# Patient Record
Sex: MALE | Race: WHITE | NOT HISPANIC OR LATINO | Employment: OTHER | URBAN - NONMETROPOLITAN AREA
[De-identification: names, ages, dates, MRNs, and addresses within clinical notes are randomized per-mention and may not be internally consistent; named-entity substitution may affect disease eponyms.]

---

## 2021-02-19 NOTE — PROGRESS NOTES
"Subjective     Christopher Valentine is a 70 year old male who presents to clinic today for the following health issues:    HPI         New Patient/Transfer of Care  Mole      Duration: long time    Description (location/character/radiation): left nipple    Intensity:  0/10    Accompanying signs and symptoms: just saw it the other day and would like it to be checked out    History (similar episodes/previous evaluation): None    Precipitating or alleviating factors: None    Therapies tried and outcome: None       There is no problem list on file for this patient.    Past Surgical History:   Procedure Laterality Date     ANKLE SURGERY  2000    LT, hardware removal and ankle fusion; traumatic arthritis     COLONOSCOPY  2011    with polypectomy       Social History     Tobacco Use     Smoking status: Light Tobacco Smoker     Types: Cigarettes     Smokeless tobacco: Never Used   Substance Use Topics     Alcohol use: Yes     Frequency: 4 or more times a week     Drinks per session: 1 or 2     Binge frequency: Never     Comment: beer daily      Family History   Problem Relation Age of Onset     Cancer - colorectal Father         (cause of death)          No current outpatient medications on file.     No Known Allergies  No lab results found.   BP Readings from Last 3 Encounters:   03/05/21 (!) 140/70    Wt Readings from Last 3 Encounters:   03/05/21 86.3 kg (190 lb 3.2 oz)                    Reviewed and updated as needed this visit by Provider                 Review of Systems   Constitutional, HEENT, cardiovascular, pulmonary, gi and gu systems are negative, except as otherwise noted.      Objective    BP (!) 140/70 (BP Location: Left arm, Patient Position: Sitting, Cuff Size: Adult Regular)   Pulse 85   Temp 98.5  F (36.9  C) (Tympanic)   Ht 1.732 m (5' 8.2\")   Wt 86.3 kg (190 lb 3.2 oz)   SpO2 97%   BMI 28.75 kg/m    Body mass index is 28.75 kg/m .  Physical Exam   GENERAL: healthy, alert and no distress  EYES: Eyes " grossly normal to inspection, PERRL and conjunctivae and sclerae normal  HENT: ear canals and TM's normal, nose and mouth without ulcers or lesions  NECK: no adenopathy, no asymmetry, masses, or scars and thyroid normal to palpation  RESP: lungs clear to auscultation - no rales, rhonchi or wheezes  CV: regular rate and rhythm, normal S1 S2, no S3 or S4, no murmur, click or rub, no peripheral edema and peripheral pulses strong  ABDOMEN: soft, nontender, no hepatosplenomegaly, no masses and bowel sounds normal  MS: no gross musculoskeletal defects noted, no edema  SKIN: no suspicious lesions or rashes  NEURO: Normal strength and tone, mentation intact and speech normal  BACK: no CVA tenderness, no paralumbar tenderness  LYMPH: no cervical, supraclavicular, axillary, or inguinal adenopathy    Diagnostic Test Results:  Labs reviewed in Epic  No results found for this or any previous visit (from the past 24 hour(s)).      Results for orders placed or performed in visit on 03/05/21   Comprehensive metabolic panel (BMP + Alb, Alk Phos, ALT, AST, Total. Bili, TP)     Status: Abnormal   Result Value Ref Range    Sodium 135 133 - 144 mmol/L    Potassium 3.8 3.4 - 5.3 mmol/L    Chloride 104 94 - 109 mmol/L    Carbon Dioxide 25 20 - 32 mmol/L    Anion Gap 6 3 - 14 mmol/L    Glucose 101 (H) 70 - 99 mg/dL    Urea Nitrogen 8 7 - 30 mg/dL    Creatinine 0.72 0.66 - 1.25 mg/dL    GFR Estimate >90 >60 mL/min/[1.73_m2]    GFR Estimate If Black >90 >60 mL/min/[1.73_m2]    Calcium 8.7 8.5 - 10.1 mg/dL    Bilirubin Total 1.6 (H) 0.2 - 1.3 mg/dL    Albumin 3.7 3.4 - 5.0 g/dL    Protein Total 7.5 6.8 - 8.8 g/dL    Alkaline Phosphatase 91 40 - 150 U/L    ALT 33 0 - 70 U/L    AST 42 0 - 45 U/L   CBC with platelets differential     Status: None   Result Value Ref Range    WBC 6.6 4.0 - 11.0 10e9/L    RBC Count 4.97 4.4 - 5.9 10e12/L    Hemoglobin 16.2 13.3 - 17.7 g/dL    Hematocrit 45.3 40.0 - 53.0 %    MCV 91 78 - 100 fl    MCH 32.6 26.5 -  33.0 pg    MCHC 35.8 31.5 - 36.5 g/dL    RDW 12.0 10.0 - 15.0 %    Platelet Count 244 150 - 450 10e9/L    Diff Method Automated Method     % Neutrophils 48.5 %    % Lymphocytes 39.4 %    % Monocytes 9.2 %    % Eosinophils 1.8 %    % Basophils 0.8 %    % Immature Granulocytes 0.3 %    Nucleated RBCs 0 0 /100    Absolute Neutrophil 3.2 1.6 - 8.3 10e9/L    Absolute Lymphocytes 2.6 0.8 - 5.3 10e9/L    Absolute Monocytes 0.6 0.0 - 1.3 10e9/L    Absolute Eosinophils 0.1 0.0 - 0.7 10e9/L    Absolute Basophils 0.1 0.0 - 0.2 10e9/L    Abs Immature Granulocytes 0.0 0 - 0.4 10e9/L    Absolute Nucleated RBC 0.0    TSH with free T4 reflex     Status: None   Result Value Ref Range    TSH 1.31 0.40 - 4.00 mU/L   PSA, screen     Status: None   Result Value Ref Range    PSA 2.33 0 - 4 ug/L   Lipid Profile (Chol, Trig, HDL, LDL calc)     Status: Abnormal   Result Value Ref Range    Cholesterol 189 <200 mg/dL    Triglycerides 258 (H) <150 mg/dL    HDL Cholesterol 52 >39 mg/dL    LDL Cholesterol Calculated 85 <100 mg/dL    Non HDL Cholesterol 137 (H) <130 mg/dL         Assessment & Plan     Special screening for malignant neoplasms, colon  He is due for this.  He is referred to see general surgery.   - GENERAL SURG ADULT REFERRAL    High cholesterol  Known ot have high tgl. Does partake in etoh at night. Lives alone and cooks well he feels.   - Lipid Profile (Chol, Trig, HDL, LDL calc); Future  - TSH with free T4 reflex    Tobacco use disorder  Will be given a screening for AAA.  He is has never had one. States he doesn't even bring a pack with him in the car any more. Has cut way back.   - [place] Aorta Medicare AAA Screening; Future  - General PFT Lab (Please always keep checked); Future  - Pulmonary Function Test; Future  - General PFT Lab (Please always keep checked)    Personal history of tobacco use  He needs a chest screening. Was a 40 efe pack smoker. Does have smokers coughing and will also get a PFT.  See how his lung status is.  "  - Prof fee: Shared Decisionmaking for Lung Cancer Screening  - CT Chest Lung Cancer Scrn Low Dose wo; Future    Encounter for hepatitis C screening test for low risk patient  Due for this.   - Hepatitis C Screen Reflex to HCV RNA Quant and Genotype    Uncomplicated alcohol dependence (H)  Does partake in etoh every night at least one can be up to 6 to 12. In a single night.   - GGT  - Comprehensive metabolic panel (BMP + Alb, Alk Phos, ALT, AST, Total. Bili, TP)  - CBC with platelets differential    Screening PSA (prostate specific antigen)  Due for screening. Denies LUTS.  He does get up at night but only once time.   - PSA, screen     Tobacco Cessation:   reports that he has been smoking cigarettes. He has never used smokeless tobacco.  Tobacco Cessation Action Plan: Information offered: Patient not interested at this time      BMI:   Estimated body mass index is 28.75 kg/m  as calculated from the following:    Height as of this encounter: 1.732 m (5' 8.2\").    Weight as of this encounter: 86.3 kg (190 lb 3.2 oz).            See Patient Instructions    No follow-ups on file.    JOSELO Suarez  [facility] - [place]  "

## 2021-03-05 ENCOUNTER — OFFICE VISIT (OUTPATIENT)
Dept: FAMILY MEDICINE | Facility: OTHER | Age: 71
End: 2021-03-05
Attending: PHYSICIAN ASSISTANT
Payer: COMMERCIAL

## 2021-03-05 VITALS
TEMPERATURE: 98.5 F | WEIGHT: 190.2 LBS | HEART RATE: 85 BPM | OXYGEN SATURATION: 97 % | DIASTOLIC BLOOD PRESSURE: 70 MMHG | BODY MASS INDEX: 28.82 KG/M2 | HEIGHT: 68 IN | SYSTOLIC BLOOD PRESSURE: 140 MMHG

## 2021-03-05 DIAGNOSIS — Z87.891 PERSONAL HISTORY OF TOBACCO USE: ICD-10-CM

## 2021-03-05 DIAGNOSIS — Z12.11 SPECIAL SCREENING FOR MALIGNANT NEOPLASMS, COLON: Primary | ICD-10-CM

## 2021-03-05 DIAGNOSIS — E78.00 HIGH CHOLESTEROL: ICD-10-CM

## 2021-03-05 DIAGNOSIS — Z11.59 ENCOUNTER FOR HEPATITIS C SCREENING TEST FOR LOW RISK PATIENT: ICD-10-CM

## 2021-03-05 DIAGNOSIS — F17.200 TOBACCO USE DISORDER: ICD-10-CM

## 2021-03-05 DIAGNOSIS — Z12.5 SCREENING PSA (PROSTATE SPECIFIC ANTIGEN): ICD-10-CM

## 2021-03-05 DIAGNOSIS — F10.20 UNCOMPLICATED ALCOHOL DEPENDENCE (H): ICD-10-CM

## 2021-03-05 LAB
ALBUMIN SERPL-MCNC: 3.7 G/DL (ref 3.4–5)
ALP SERPL-CCNC: 91 U/L (ref 40–150)
ALT SERPL W P-5'-P-CCNC: 33 U/L (ref 0–70)
ANION GAP SERPL CALCULATED.3IONS-SCNC: 6 MMOL/L (ref 3–14)
AST SERPL W P-5'-P-CCNC: 42 U/L (ref 0–45)
BASOPHILS # BLD AUTO: 0.1 10E9/L (ref 0–0.2)
BASOPHILS NFR BLD AUTO: 0.8 %
BILIRUB SERPL-MCNC: 1.6 MG/DL (ref 0.2–1.3)
BUN SERPL-MCNC: 8 MG/DL (ref 7–30)
CALCIUM SERPL-MCNC: 8.7 MG/DL (ref 8.5–10.1)
CHLORIDE SERPL-SCNC: 104 MMOL/L (ref 94–109)
CHOLEST SERPL-MCNC: 189 MG/DL
CO2 SERPL-SCNC: 25 MMOL/L (ref 20–32)
CREAT SERPL-MCNC: 0.72 MG/DL (ref 0.66–1.25)
DIFFERENTIAL METHOD BLD: NORMAL
EOSINOPHIL # BLD AUTO: 0.1 10E9/L (ref 0–0.7)
EOSINOPHIL NFR BLD AUTO: 1.8 %
ERYTHROCYTE [DISTWIDTH] IN BLOOD BY AUTOMATED COUNT: 12 % (ref 10–15)
GFR SERPL CREATININE-BSD FRML MDRD: >90 ML/MIN/{1.73_M2}
GLUCOSE SERPL-MCNC: 101 MG/DL (ref 70–99)
HCT VFR BLD AUTO: 45.3 % (ref 40–53)
HDLC SERPL-MCNC: 52 MG/DL
HGB BLD-MCNC: 16.2 G/DL (ref 13.3–17.7)
IMM GRANULOCYTES # BLD: 0 10E9/L (ref 0–0.4)
IMM GRANULOCYTES NFR BLD: 0.3 %
LDLC SERPL CALC-MCNC: 85 MG/DL
LYMPHOCYTES # BLD AUTO: 2.6 10E9/L (ref 0.8–5.3)
LYMPHOCYTES NFR BLD AUTO: 39.4 %
MCH RBC QN AUTO: 32.6 PG (ref 26.5–33)
MCHC RBC AUTO-ENTMCNC: 35.8 G/DL (ref 31.5–36.5)
MCV RBC AUTO: 91 FL (ref 78–100)
MONOCYTES # BLD AUTO: 0.6 10E9/L (ref 0–1.3)
MONOCYTES NFR BLD AUTO: 9.2 %
NEUTROPHILS # BLD AUTO: 3.2 10E9/L (ref 1.6–8.3)
NEUTROPHILS NFR BLD AUTO: 48.5 %
NONHDLC SERPL-MCNC: 137 MG/DL
NRBC # BLD AUTO: 0 10*3/UL
NRBC BLD AUTO-RTO: 0 /100
PLATELET # BLD AUTO: 244 10E9/L (ref 150–450)
POTASSIUM SERPL-SCNC: 3.8 MMOL/L (ref 3.4–5.3)
PROT SERPL-MCNC: 7.5 G/DL (ref 6.8–8.8)
PSA SERPL-ACNC: 2.33 UG/L (ref 0–4)
RBC # BLD AUTO: 4.97 10E12/L (ref 4.4–5.9)
SODIUM SERPL-SCNC: 135 MMOL/L (ref 133–144)
TRIGL SERPL-MCNC: 258 MG/DL
TSH SERPL DL<=0.005 MIU/L-ACNC: 1.31 MU/L (ref 0.4–4)
WBC # BLD AUTO: 6.6 10E9/L (ref 4–11)

## 2021-03-05 PROCEDURE — G0103 PSA SCREENING: HCPCS | Mod: ZL | Performed by: PHYSICIAN ASSISTANT

## 2021-03-05 PROCEDURE — 80061 LIPID PANEL: CPT | Mod: ZL | Performed by: PHYSICIAN ASSISTANT

## 2021-03-05 PROCEDURE — G0463 HOSPITAL OUTPT CLINIC VISIT: HCPCS

## 2021-03-05 PROCEDURE — 80053 COMPREHEN METABOLIC PANEL: CPT | Mod: ZL | Performed by: PHYSICIAN ASSISTANT

## 2021-03-05 PROCEDURE — 36415 COLL VENOUS BLD VENIPUNCTURE: CPT | Mod: ZL | Performed by: PHYSICIAN ASSISTANT

## 2021-03-05 PROCEDURE — 86803 HEPATITIS C AB TEST: CPT | Mod: ZL | Performed by: PHYSICIAN ASSISTANT

## 2021-03-05 PROCEDURE — G0463 HOSPITAL OUTPT CLINIC VISIT: HCPCS | Mod: 25

## 2021-03-05 PROCEDURE — G0296 VISIT TO DETERM LDCT ELIG: HCPCS | Performed by: PHYSICIAN ASSISTANT

## 2021-03-05 PROCEDURE — 82977 ASSAY OF GGT: CPT | Mod: ZL | Performed by: PHYSICIAN ASSISTANT

## 2021-03-05 PROCEDURE — 99204 OFFICE O/P NEW MOD 45 MIN: CPT | Mod: 25 | Performed by: PHYSICIAN ASSISTANT

## 2021-03-05 PROCEDURE — 84443 ASSAY THYROID STIM HORMONE: CPT | Mod: ZL | Performed by: PHYSICIAN ASSISTANT

## 2021-03-05 PROCEDURE — 85025 COMPLETE CBC W/AUTO DIFF WBC: CPT | Mod: ZL | Performed by: PHYSICIAN ASSISTANT

## 2021-03-05 SDOH — HEALTH STABILITY: MENTAL HEALTH: HOW OFTEN DO YOU HAVE 6 OR MORE DRINKS ON ONE OCCASION?: NEVER

## 2021-03-05 SDOH — HEALTH STABILITY: MENTAL HEALTH: HOW OFTEN DO YOU HAVE A DRINK CONTAINING ALCOHOL?: 4 OR MORE TIMES A WEEK

## 2021-03-05 SDOH — HEALTH STABILITY: MENTAL HEALTH: HOW MANY STANDARD DRINKS CONTAINING ALCOHOL DO YOU HAVE ON A TYPICAL DAY?: 1 OR 2

## 2021-03-05 ASSESSMENT — ANXIETY QUESTIONNAIRES
2. NOT BEING ABLE TO STOP OR CONTROL WORRYING: NOT AT ALL
GAD7 TOTAL SCORE: 0
6. BECOMING EASILY ANNOYED OR IRRITABLE: NOT AT ALL
7. FEELING AFRAID AS IF SOMETHING AWFUL MIGHT HAPPEN: NOT AT ALL
5. BEING SO RESTLESS THAT IT IS HARD TO SIT STILL: NOT AT ALL
1. FEELING NERVOUS, ANXIOUS, OR ON EDGE: NOT AT ALL
4. TROUBLE RELAXING: NOT AT ALL
3. WORRYING TOO MUCH ABOUT DIFFERENT THINGS: NOT AT ALL

## 2021-03-05 ASSESSMENT — PAIN SCALES - GENERAL: PAINLEVEL: MILD PAIN (3)

## 2021-03-05 ASSESSMENT — PATIENT HEALTH QUESTIONNAIRE - PHQ9: SUM OF ALL RESPONSES TO PHQ QUESTIONS 1-9: 0

## 2021-03-05 ASSESSMENT — MIFFLIN-ST. JEOR: SCORE: 1605.42

## 2021-03-05 NOTE — PATIENT INSTRUCTIONS

## 2021-03-05 NOTE — PROGRESS NOTES
Lung Cancer Screening Shared Decision Making Visit     Christopher Valentine is eligible for lung cancer screening on the basis of the information provided in my signed lung cancer screening order.     I have discussed with patient the risks and benefits of screening for lung cancer with low-dose CT.     The risks include:  radiation exposure: one low dose chest CT has as much ionizing radiation as about 15 chest x-rays or 6 months of background radiation living in [place]    false positives: 96% of positive findings/nodules are NOT cancer, but some might still require additional diagnostic evaluation, including biopsy  over-diagnosis: some slow growing cancers that might never have been clinically significant will be detected and treated unnecessarily     The benefit of early detection of lung cancer is contingent upon adherence to annual screening or more frequent follow up if indicated.     Furthermore, reaping the benefits of screening requires Christopher Valentine to be willing and physically able to undergo diagnostic procedures, if indicated. Although no specific guide is available for determining severity of comorbidities, it is reasonable to withhold screening in patients who have greater mortality risk from other diseases.     We did discuss that the only way to prevent lung cancer is to not smoke. Smoking cessation counseling was given, duration 3-10 minutes.      I did offer risk estimation using a calculator such as this one:    ShouldIScreen

## 2021-03-05 NOTE — NURSING NOTE
"Chief Complaint   Patient presents with     Establish Care       Initial BP (!) 140/70 (BP Location: Left arm, Patient Position: Sitting, Cuff Size: Adult Regular)   Pulse 85   Temp 98.5  F (36.9  C) (Tympanic)   Ht 1.732 m (5' 8.2\")   Wt 86.3 kg (190 lb 3.2 oz)   SpO2 97%   BMI 28.75 kg/m   Estimated body mass index is 28.75 kg/m  as calculated from the following:    Height as of this encounter: 1.732 m (5' 8.2\").    Weight as of this encounter: 86.3 kg (190 lb 3.2 oz).  Medication Reconciliation: complete  Kay Angelo LPN  "

## 2021-03-06 LAB — GGT SERPL-CCNC: 292 U/L (ref 0–75)

## 2021-03-06 ASSESSMENT — ANXIETY QUESTIONNAIRES: GAD7 TOTAL SCORE: 0

## 2021-03-08 LAB — HCV AB SERPL QL IA: NONREACTIVE

## 2021-03-16 ENCOUNTER — HOSPITAL ENCOUNTER (OUTPATIENT)
Dept: RESPIRATORY THERAPY | Facility: HOSPITAL | Age: 71
Discharge: HOME | End: 2021-03-16
Attending: PHYSICIAN ASSISTANT | Admitting: INTERNAL MEDICINE
Payer: COMMERCIAL

## 2021-03-16 DIAGNOSIS — F17.200 TOBACCO USE DISORDER: ICD-10-CM

## 2021-03-16 LAB
COHGB MFR BLD: 1.1 % (ref 0–2)
HGB BLD-MCNC: 16.3 G/DL (ref 13.3–17.7)

## 2021-03-16 PROCEDURE — 82375 ASSAY CARBOXYHB QUANT: CPT | Performed by: PHYSICIAN ASSISTANT

## 2021-03-16 PROCEDURE — 85018 HEMOGLOBIN: CPT | Performed by: PHYSICIAN ASSISTANT

## 2021-03-16 PROCEDURE — 94729 DIFFUSING CAPACITY: CPT | Mod: 26 | Performed by: INTERNAL MEDICINE

## 2021-03-16 PROCEDURE — 94729 DIFFUSING CAPACITY: CPT

## 2021-03-16 PROCEDURE — 94010 BREATHING CAPACITY TEST: CPT | Mod: 26 | Performed by: INTERNAL MEDICINE

## 2021-03-16 PROCEDURE — 94726 PLETHYSMOGRAPHY LUNG VOLUMES: CPT | Mod: 26 | Performed by: INTERNAL MEDICINE

## 2021-03-16 PROCEDURE — 94726 PLETHYSMOGRAPHY LUNG VOLUMES: CPT

## 2021-03-16 PROCEDURE — 94010 BREATHING CAPACITY TEST: CPT

## 2021-03-18 ENCOUNTER — PREP FOR PROCEDURE (OUTPATIENT)
Dept: SURGERY | Facility: OTHER | Age: 71
End: 2021-03-18

## 2021-03-18 ENCOUNTER — TELEPHONE (OUTPATIENT)
Dept: SURGERY | Facility: OTHER | Age: 71
End: 2021-03-18

## 2021-03-18 DIAGNOSIS — Z01.818 PREOP TESTING: Primary | ICD-10-CM

## 2021-03-18 DIAGNOSIS — Z12.11 SPECIAL SCREENING FOR MALIGNANT NEOPLASMS, COLON: Primary | ICD-10-CM

## 2021-03-18 NOTE — TELEPHONE ENCOUNTER
Referral received for colonoscopy.   This patient was approved by surgery education nurses for meet and greet colonoscopy and will need a preop appointment.   Patient scheduled for colonoscopy on 4/15/21 with Dr. Smith at [facility] with gatorade bowel prep.   Instructions given via phone and instructions mailed to patient with surgery handbook. Larisa Gomez LPN

## 2021-03-26 ENCOUNTER — HOSPITAL ENCOUNTER (OUTPATIENT)
Dept: ULTRASOUND IMAGING | Facility: HOSPITAL | Age: 71
End: 2021-03-26
Attending: PHYSICIAN ASSISTANT
Payer: COMMERCIAL

## 2021-03-26 ENCOUNTER — HOSPITAL ENCOUNTER (OUTPATIENT)
Dept: CT IMAGING | Facility: HOSPITAL | Age: 71
End: 2021-03-26
Attending: PHYSICIAN ASSISTANT
Payer: COMMERCIAL

## 2021-03-26 DIAGNOSIS — F17.200 TOBACCO USE DISORDER: ICD-10-CM

## 2021-03-26 DIAGNOSIS — Z87.891 PERSONAL HISTORY OF TOBACCO USE: ICD-10-CM

## 2021-03-26 PROCEDURE — 71271 CT THORAX LUNG CANCER SCR C-: CPT

## 2021-03-26 PROCEDURE — 76706 US ABDL AORTA SCREEN AAA: CPT

## 2021-03-26 NOTE — H&P (VIEW-ONLY)
Subjective     Christopher Valentine is a 71 year old male who presents to clinic today for the following health issues:    HPI         Concern - Mole follow up  Onset: has been there for a long time  Description: left nipple  Intensity: moderate  Progression of Symptoms:  same and constant  Accompanying Signs & Symptoms: NA  Previous history of similar problem: NA  Precipitating factors:        Worsened by: NA  Alleviating factors:        Improved by: NA  Therapies tried and outcome: monitor    Follow up on review of testing.     Taking Medication as prescribed: yes    Side Effects:  None    Medication Helping Symptoms:  yes       Patient Active Problem List   Diagnosis     Special screening for malignant neoplasms, colon     Uncomplicated alcohol dependence (H)     Tobacco use disorder     High cholesterol     Past Surgical History:   Procedure Laterality Date     ANKLE SURGERY  2000    LT, hardware removal and ankle fusion; traumatic arthritis     COLONOSCOPY  2011    with polypectomy       Social History     Tobacco Use     Smoking status: Light Tobacco Smoker     Types: Cigarettes     Smokeless tobacco: Never Used   Substance Use Topics     Alcohol use: Yes     Frequency: 4 or more times a week     Drinks per session: 1 or 2     Binge frequency: Never     Comment: beer daily      Family History   Problem Relation Age of Onset     Cancer - colorectal Father         (cause of death)      Cancer Father      Alzheimer Disease Mother          Current Outpatient Medications   Medication Sig Dispense Refill     aspirin (ASA) 325 MG EC tablet Take 325 mg by mouth every 6 hours as needed for moderate pain       No Known Allergies  Recent Labs   Lab Test 03/05/21  1439   LDL 85   HDL 52   TRIG 258*   ALT 33   CR 0.72   GFRESTIMATED >90   GFRESTBLACK >90   POTASSIUM 3.8   TSH 1.31      BP Readings from Last 3 Encounters:   04/02/21 132/76   03/05/21 (!) 140/70    Wt Readings from Last 3 Encounters:   04/02/21 86.6 kg (191  "lb)   03/05/21 86.3 kg (190 lb 3.2 oz)                    Reviewed and updated as needed this visit by Provider                 Review of Systems   Constitutional, HEENT, cardiovascular, pulmonary, gi and gu systems are negative, except as otherwise noted.      Objective    /76 (BP Location: Right arm, Patient Position: Sitting, Cuff Size: Adult Large)   Pulse 77   Temp 97.8  F (36.6  C) (Tympanic)   Ht 1.735 m (5' 8.3\")   Wt 86.6 kg (191 lb)   SpO2 97%   BMI 28.79 kg/m    Body mass index is 28.79 kg/m .  Physical Exam   GENERAL: healthy, alert and no distress  NECK: no adenopathy, no asymmetry, masses, or scars and thyroid normal to palpation  RESP: lungs clear to auscultation - no rales, rhonchi or wheezes  SKIN: has 7 lesions all AK, frozen and thaw x3 per protocol. These we all on his torso and arms.   PSYCHE: reviewed all imaging and explained findings.  Labs and findings and repeat follow up.         Hospital Outpatient Visit on 03/16/2021   Component Date Value Ref Range Status     Carbon Monoxide 03/16/2021 1.1  0 - 2 % Final     Hemoglobin 03/16/2021 16.3  13.3 - 17.7 g/dL Final     Review PFT and his labs.   Lab Results   Component Value Date    WBC 6.6 03/05/2021     Lab Results   Component Value Date    RBC 4.97 03/05/2021     Lab Results   Component Value Date    HGB 16.3 03/16/2021     Lab Results   Component Value Date    HCT 45.3 03/05/2021     No components found for: MCT  Lab Results   Component Value Date    MCV 91 03/05/2021     Lab Results   Component Value Date    MCH 32.6 03/05/2021     Lab Results   Component Value Date    MCHC 35.8 03/05/2021     Lab Results   Component Value Date    RDW 12.0 03/05/2021     Lab Results   Component Value Date     03/05/2021     Last Comprehensive Metabolic Panel:  Sodium   Date Value Ref Range Status   03/05/2021 135 133 - 144 mmol/L Final     Potassium   Date Value Ref Range Status   03/05/2021 3.8 3.4 - 5.3 mmol/L Final     Chloride "   Date Value Ref Range Status   03/05/2021 104 94 - 109 mmol/L Final     Carbon Dioxide   Date Value Ref Range Status   03/05/2021 25 20 - 32 mmol/L Final     Anion Gap   Date Value Ref Range Status   03/05/2021 6 3 - 14 mmol/L Final     Glucose   Date Value Ref Range Status   03/05/2021 101 (H) 70 - 99 mg/dL Final     Urea Nitrogen   Date Value Ref Range Status   03/05/2021 8 7 - 30 mg/dL Final     Creatinine   Date Value Ref Range Status   03/05/2021 0.72 0.66 - 1.25 mg/dL Final     GFR Estimate   Date Value Ref Range Status   03/05/2021 >90 >60 mL/min/[1.73_m2] Final     Comment:     Non  GFR Calc  Starting 12/18/2018, serum creatinine based estimated GFR (eGFR) will be   calculated using the Chronic Kidney Disease Epidemiology Collaboration   (CKD-EPI) equation.       Calcium   Date Value Ref Range Status   03/05/2021 8.7 8.5 - 10.1 mg/dL Final     Bilirubin Total   Date Value Ref Range Status   03/05/2021 1.6 (H) 0.2 - 1.3 mg/dL Final     Alkaline Phosphatase   Date Value Ref Range Status   03/05/2021 91 40 - 150 U/L Final     ALT   Date Value Ref Range Status   03/05/2021 33 0 - 70 U/L Final     AST   Date Value Ref Range Status   03/05/2021 42 0 - 45 U/L Final             PSA   Date Value Ref Range Status   03/05/2021 2.33 0 - 4 ug/L Final     Comment:     Assay Method:  Chemiluminescence using Siemens Vista analyzer     Lab Results   Component Value Date    CHOL 189 03/05/2021     Lab Results   Component Value Date    HDL 52 03/05/2021     Lab Results   Component Value Date    LDL 85 03/05/2021     Lab Results   Component Value Date    TRIG 258 03/05/2021     No results found for: CHOLHDLRATIO          Assessment & Plan     Need for vaccination for pneumococcus  Given. declines the TDAP not covered by Medicare   - PPSV23, IM/SUBQ (2+ YRS) - [id number]    AK (actinic keratosis)  Multiple on thorax frozen and thaw per protocol.     High cholesterol  We reviewed his labs TGL are really the only  issues. Denies gout. Encourage healthy lifestyle. He is reviewed on his Chest CT as well. Mild Coronary Calcium.      Tobacco use disorder  Small nodules are noted. Mild Emphysema on reviewing his PFT with him today. Also noted his CT shows no sign of emphysema on the scan.   Explained he did not meet criteria for bronchodilator but when he is sick he might need it. He still has prolonged expiratory phase.       5. Preoperative clearance  Graham needs clearance for his colon screening , his lungs and heart are evaluated and reviewed. He is optimized.         See Patient Instructions    No follow-ups on file.    JOSELO Suarez  [facility] - [place]

## 2021-03-26 NOTE — PROGRESS NOTES
Subjective     Christopher Valentine is a 71 year old male who presents to clinic today for the following health issues:    HPI         Concern - Mole follow up  Onset: has been there for a long time  Description: left nipple  Intensity: moderate  Progression of Symptoms:  same and constant  Accompanying Signs & Symptoms: NA  Previous history of similar problem: NA  Precipitating factors:        Worsened by: NA  Alleviating factors:        Improved by: NA  Therapies tried and outcome: monitor    Follow up on review of testing.     Taking Medication as prescribed: yes    Side Effects:  None    Medication Helping Symptoms:  yes       Patient Active Problem List   Diagnosis     Special screening for malignant neoplasms, colon     Uncomplicated alcohol dependence (H)     Tobacco use disorder     High cholesterol     Past Surgical History:   Procedure Laterality Date     ANKLE SURGERY  2000    LT, hardware removal and ankle fusion; traumatic arthritis     COLONOSCOPY  2011    with polypectomy       Social History     Tobacco Use     Smoking status: Light Tobacco Smoker     Types: Cigarettes     Smokeless tobacco: Never Used   Substance Use Topics     Alcohol use: Yes     Frequency: 4 or more times a week     Drinks per session: 1 or 2     Binge frequency: Never     Comment: beer daily      Family History   Problem Relation Age of Onset     Cancer - colorectal Father         (cause of death)      Cancer Father      Alzheimer Disease Mother          Current Outpatient Medications   Medication Sig Dispense Refill     aspirin (ASA) 325 MG EC tablet Take 325 mg by mouth every 6 hours as needed for moderate pain       No Known Allergies  Recent Labs   Lab Test 03/05/21  1439   LDL 85   HDL 52   TRIG 258*   ALT 33   CR 0.72   GFRESTIMATED >90   GFRESTBLACK >90   POTASSIUM 3.8   TSH 1.31      BP Readings from Last 3 Encounters:   04/02/21 132/76   03/05/21 (!) 140/70    Wt Readings from Last 3 Encounters:   04/02/21 86.6 kg (191  "lb)   03/05/21 86.3 kg (190 lb 3.2 oz)                    Reviewed and updated as needed this visit by Provider                 Review of Systems   Constitutional, HEENT, cardiovascular, pulmonary, gi and gu systems are negative, except as otherwise noted.      Objective    /76 (BP Location: Right arm, Patient Position: Sitting, Cuff Size: Adult Large)   Pulse 77   Temp 97.8  F (36.6  C) (Tympanic)   Ht 1.735 m (5' 8.3\")   Wt 86.6 kg (191 lb)   SpO2 97%   BMI 28.79 kg/m    Body mass index is 28.79 kg/m .  Physical Exam   GENERAL: healthy, alert and no distress  NECK: no adenopathy, no asymmetry, masses, or scars and thyroid normal to palpation  RESP: lungs clear to auscultation - no rales, rhonchi or wheezes  SKIN: has 7 lesions all AK, frozen and thaw x3 per protocol. These we all on his torso and arms.   PSYCHE: reviewed all imaging and explained findings.  Labs and findings and repeat follow up.         Hospital Outpatient Visit on 03/16/2021   Component Date Value Ref Range Status     Carbon Monoxide 03/16/2021 1.1  0 - 2 % Final     Hemoglobin 03/16/2021 16.3  13.3 - 17.7 g/dL Final     Review PFT and his labs.   Lab Results   Component Value Date    WBC 6.6 03/05/2021     Lab Results   Component Value Date    RBC 4.97 03/05/2021     Lab Results   Component Value Date    HGB 16.3 03/16/2021     Lab Results   Component Value Date    HCT 45.3 03/05/2021     No components found for: MCT  Lab Results   Component Value Date    MCV 91 03/05/2021     Lab Results   Component Value Date    MCH 32.6 03/05/2021     Lab Results   Component Value Date    MCHC 35.8 03/05/2021     Lab Results   Component Value Date    RDW 12.0 03/05/2021     Lab Results   Component Value Date     03/05/2021     Last Comprehensive Metabolic Panel:  Sodium   Date Value Ref Range Status   03/05/2021 135 133 - 144 mmol/L Final     Potassium   Date Value Ref Range Status   03/05/2021 3.8 3.4 - 5.3 mmol/L Final     Chloride "   Date Value Ref Range Status   03/05/2021 104 94 - 109 mmol/L Final     Carbon Dioxide   Date Value Ref Range Status   03/05/2021 25 20 - 32 mmol/L Final     Anion Gap   Date Value Ref Range Status   03/05/2021 6 3 - 14 mmol/L Final     Glucose   Date Value Ref Range Status   03/05/2021 101 (H) 70 - 99 mg/dL Final     Urea Nitrogen   Date Value Ref Range Status   03/05/2021 8 7 - 30 mg/dL Final     Creatinine   Date Value Ref Range Status   03/05/2021 0.72 0.66 - 1.25 mg/dL Final     GFR Estimate   Date Value Ref Range Status   03/05/2021 >90 >60 mL/min/[1.73_m2] Final     Comment:     Non  GFR Calc  Starting 12/18/2018, serum creatinine based estimated GFR (eGFR) will be   calculated using the Chronic Kidney Disease Epidemiology Collaboration   (CKD-EPI) equation.       Calcium   Date Value Ref Range Status   03/05/2021 8.7 8.5 - 10.1 mg/dL Final     Bilirubin Total   Date Value Ref Range Status   03/05/2021 1.6 (H) 0.2 - 1.3 mg/dL Final     Alkaline Phosphatase   Date Value Ref Range Status   03/05/2021 91 40 - 150 U/L Final     ALT   Date Value Ref Range Status   03/05/2021 33 0 - 70 U/L Final     AST   Date Value Ref Range Status   03/05/2021 42 0 - 45 U/L Final             PSA   Date Value Ref Range Status   03/05/2021 2.33 0 - 4 ug/L Final     Comment:     Assay Method:  Chemiluminescence using Siemens Vista analyzer     Lab Results   Component Value Date    CHOL 189 03/05/2021     Lab Results   Component Value Date    HDL 52 03/05/2021     Lab Results   Component Value Date    LDL 85 03/05/2021     Lab Results   Component Value Date    TRIG 258 03/05/2021     No results found for: CHOLHDLRATIO          Assessment & Plan     Need for vaccination for pneumococcus  Given. declines the TDAP not covered by Medicare   - PPSV23, IM/SUBQ (2+ YRS) - [id number]    AK (actinic keratosis)  Multiple on thorax frozen and thaw per protocol.     High cholesterol  We reviewed his labs TGL are really the only  issues. Denies gout. Encourage healthy lifestyle. He is reviewed on his Chest CT as well. Mild Coronary Calcium.      Tobacco use disorder  Small nodules are noted. Mild Emphysema on reviewing his PFT with him today. Also noted his CT shows no sign of emphysema on the scan.   Explained he did not meet criteria for bronchodilator but when he is sick he might need it. He still has prolonged expiratory phase.       5. Preoperative clearance  Graham needs clearance for his colon screening , his lungs and heart are evaluated and reviewed. He is optimized.         See Patient Instructions    No follow-ups on file.    JOSELO Suarez  [facility] - [place]

## 2021-04-02 ENCOUNTER — OFFICE VISIT (OUTPATIENT)
Dept: FAMILY MEDICINE | Facility: OTHER | Age: 71
End: 2021-04-02
Attending: PHYSICIAN ASSISTANT
Payer: COMMERCIAL

## 2021-04-02 VITALS
BODY MASS INDEX: 28.95 KG/M2 | WEIGHT: 191 LBS | TEMPERATURE: 97.8 F | OXYGEN SATURATION: 97 % | HEART RATE: 77 BPM | HEIGHT: 68 IN | SYSTOLIC BLOOD PRESSURE: 132 MMHG | DIASTOLIC BLOOD PRESSURE: 76 MMHG

## 2021-04-02 DIAGNOSIS — F17.200 TOBACCO USE DISORDER: ICD-10-CM

## 2021-04-02 DIAGNOSIS — Z01.818 PREOPERATIVE CLEARANCE: ICD-10-CM

## 2021-04-02 DIAGNOSIS — L57.0 AK (ACTINIC KERATOSIS): ICD-10-CM

## 2021-04-02 DIAGNOSIS — E78.00 HIGH CHOLESTEROL: ICD-10-CM

## 2021-04-02 DIAGNOSIS — Z23 NEED FOR VACCINATION FOR PNEUMOCOCCUS: Primary | ICD-10-CM

## 2021-04-02 PROCEDURE — 17003 DESTRUCT PREMALG LES 2-14: CPT | Performed by: PHYSICIAN ASSISTANT

## 2021-04-02 PROCEDURE — 99213 OFFICE O/P EST LOW 20 MIN: CPT | Mod: 25 | Performed by: PHYSICIAN ASSISTANT

## 2021-04-02 PROCEDURE — G0009 ADMIN PNEUMOCOCCAL VACCINE: HCPCS

## 2021-04-02 PROCEDURE — 17000 DESTRUCT PREMALG LESION: CPT | Performed by: PHYSICIAN ASSISTANT

## 2021-04-02 PROCEDURE — G0463 HOSPITAL OUTPT CLINIC VISIT: HCPCS | Mod: 25

## 2021-04-02 ASSESSMENT — PAIN SCALES - GENERAL: PAINLEVEL: NO PAIN (0)

## 2021-04-02 ASSESSMENT — MIFFLIN-ST. JEOR: SCORE: 1605.63

## 2021-04-02 NOTE — NURSING NOTE
"Chief Complaint   Patient presents with     Derm Problem       Initial /76 (BP Location: Right arm, Patient Position: Sitting, Cuff Size: Adult Large)   Pulse 77   Temp 97.8  F (36.6  C) (Tympanic)   Ht 1.735 m (5' 8.3\")   Wt 86.6 kg (191 lb)   SpO2 97%   BMI 28.79 kg/m   Estimated body mass index is 28.79 kg/m  as calculated from the following:    Height as of this encounter: 1.735 m (5' 8.3\").    Weight as of this encounter: 86.6 kg (191 lb).  Medication Reconciliation: complete  Gifty Licona LPN  "

## 2021-04-02 NOTE — PATIENT INSTRUCTIONS
Thank you for choosing [facility].   I have office hours 8:00 am to 4:30 pm on Monday's, Wednesday's, Thursday's and Friday's. My nurse and I are out of the office every Tuesday.    Following your visit, when your labs and diagnostic testing have returned, I will review then and you will be contacted by my nurse.  If you are on My Chart, you can also view results there.    For refills, notify your pharmacy regarding what you need and the pharmacy will generate a refill request. Do not call my nurse as she is unable to process refill request. Please plan ahead and allow 3-5 days for refill requests.    You will generally receive a reminder call the day prior to your appointment.  If you cannot attend your appointment, please cancel your appointment with as much notice as possible.  If there is a pattern of failure to present for your appointments, I cannot provide consistent, meaningful, ongoing care for you. It is very important to me that you come in for your care, so we can best assist you with your health care needs.    IMPORTANT:  Please note that it is my standard of practice to NOT participate in prescribing ongoing requested Narcotic Analgesic therapy, and/or participate in the prescribing of other controlled substances.  My nurse and I am happy to assist you with the process of referral for alternative pain management as needed, and other treatment modalities including but not limited to:  Physical Therapy, Physical Medicine and Rehab, Counseling, Chiropractic Care, Orthopedic Care, and non-narcotic medication management.     In the event that you need to be seen for emergent concerns and I am out of office,  please see one of my colleagues for acute concerns.  You may also present to  or ER.  I appreciate the opportunity to serve you and look forward to supporting your healthcare needs in the future. Please contact me with any questions or concerns that you may  have.    Sincerely,      Di Orr RN, PA-C

## 2021-04-08 ENCOUNTER — ANESTHESIA EVENT (OUTPATIENT)
Dept: SURGERY | Facility: HOSPITAL | Age: 71
End: 2021-04-08
Payer: COMMERCIAL

## 2021-04-08 ASSESSMENT — LIFESTYLE VARIABLES: TOBACCO_USE: 1

## 2021-04-08 NOTE — ANESTHESIA PREPROCEDURE EVALUATION
Anesthesia Pre-Procedure Evaluation    Patient: Christopher Valentine   MRN: [id number] : 1950        Preoperative Diagnosis: Special screening for malignant neoplasms, colon [Z12.11]   Procedure : Procedure(s):  Colonoscopy, possible biopsy, possible polypectomy     Past Medical History:   Diagnosis Date     Alcohol Abuse 2011     Dyslipidemia 2011      Past Surgical History:   Procedure Laterality Date     ANKLE SURGERY      LT, hardware removal and ankle fusion; traumatic arthritis     COLONOSCOPY      with polypectomy      No Known Allergies   Social History     Tobacco Use     Smoking status: Light Tobacco Smoker     Types: Cigarettes     Smokeless tobacco: Never Used   Substance Use Topics     Alcohol use: Yes     Frequency: 4 or more times a week     Drinks per session: 1 or 2     Binge frequency: Never     Comment: beer daily       Wt Readings from Last 1 Encounters:   21 86.6 kg (191 lb)        Anesthesia Evaluation   Pt has had prior anesthetic.     No history of anesthetic complications       ROS/MED HX  ENT/Pulmonary:     (+) tobacco use, Current use, 1 packs/day,     Neurologic:  - neg neurologic ROS     Cardiovascular:     (+) Dyslipidemia -----Taking blood thinners Instructions Given to patient: .     METS/Exercise Tolerance:     Hematologic:  - neg hematologic  ROS     Musculoskeletal:  - neg musculoskeletal ROS     GI/Hepatic:     (+) bowel prep,     Renal/Genitourinary:  - neg Renal ROS     Endo:  - neg endo ROS     Psychiatric/Substance Use:     (+) alcohol abuse     Infectious Disease:  - neg infectious disease ROS     Malignancy:  - neg malignancy ROS     Other:  - neg other ROS          Physical Exam    Airway  airway exam normal      Mallampati: II   TM distance: > 3 FB   Neck ROM: full   Mouth opening: > 3 cm    Respiratory Devices and Support         Dental  no notable dental history     (+) upper dentures and lower dentures      Cardiovascular    cardiovascular exam normal       Rhythm and rate: regular and normal     Pulmonary   pulmonary exam normal        breath sounds clear to auscultation           OUTSIDE LABS:  CBC:   Lab Results   Component Value Date    WBC 6.6 03/05/2021    HGB 16.3 03/16/2021    HGB 16.2 03/05/2021    HCT 45.3 03/05/2021     03/05/2021     BMP:   Lab Results   Component Value Date     03/05/2021    POTASSIUM 3.8 03/05/2021    CHLORIDE 104 03/05/2021    CO2 25 03/05/2021    BUN 8 03/05/2021    CR 0.72 03/05/2021     (H) 03/05/2021     COAGS: No results found for: PTT, INR, FIBR  POC: No results found for: BGM, HCG, HCGS  HEPATIC:   Lab Results   Component Value Date    ALBUMIN 3.7 03/05/2021    PROTTOTAL 7.5 03/05/2021    ALT 33 03/05/2021    AST 42 03/05/2021     (H) 03/05/2021    ALKPHOS 91 03/05/2021    BILITOTAL 1.6 (H) 03/05/2021     OTHER:   Lab Results   Component Value Date    TOBIAS 8.7 03/05/2021    TSH 1.31 03/05/2021       Anesthesia Plan    ASA Status:  3      Anesthesia Type: MAC.      Maintenance: TIVA.        Consents    Anesthesia Plan(s) and associated risks, benefits, and realistic alternatives discussed. Questions answered and patient/representative(s) expressed understanding.     - Discussed with:  Patient      - Extended Intubation/Ventilatory Support Discussed: Yes.      - Patient is DNR/DNI Status: No    Use of blood products discussed: No .     Postoperative Care            Comments:    4/2/21 Dominga Mccoy, JACINTA CRNA

## 2021-04-11 ENCOUNTER — OFFICE VISIT (OUTPATIENT)
Dept: FAMILY MEDICINE | Facility: OTHER | Age: 71
End: 2021-04-11
Attending: PHYSICIAN ASSISTANT
Payer: COMMERCIAL

## 2021-04-11 DIAGNOSIS — Z01.818 PREOP TESTING: ICD-10-CM

## 2021-04-11 DIAGNOSIS — Z20.822 COVID-19 RULED OUT: Primary | ICD-10-CM

## 2021-04-11 LAB
SARS-COV-2 RNA RESP QL NAA+PROBE: NORMAL
SPECIMEN SOURCE: NORMAL

## 2021-04-11 PROCEDURE — U0003 INFECTIOUS AGENT DETECTION BY NUCLEIC ACID (DNA OR RNA); SEVERE ACUTE RESPIRATORY SYNDROME CORONAVIRUS 2 (SARS-COV-2) (CORONAVIRUS DISEASE [COVID-19]), AMPLIFIED PROBE TECHNIQUE, MAKING USE OF HIGH THROUGHPUT TECHNOLOGIES AS DESCRIBED BY CMS-2020-01-R: HCPCS | Mod: ZL | Performed by: SURGERY

## 2021-04-11 PROCEDURE — U0005 INFEC AGEN DETEC AMPLI PROBE: HCPCS | Mod: ZL | Performed by: SURGERY

## 2021-04-12 LAB
LABORATORY COMMENT REPORT: NORMAL
SARS-COV-2 RNA RESP QL NAA+PROBE: NEGATIVE
SPECIMEN SOURCE: NORMAL

## 2021-04-15 ENCOUNTER — HOSPITAL ENCOUNTER (OUTPATIENT)
Facility: HOSPITAL | Age: 71
Discharge: HOME | End: 2021-04-15
Attending: SURGERY | Admitting: SURGERY
Payer: COMMERCIAL

## 2021-04-15 ENCOUNTER — ANESTHESIA (OUTPATIENT)
Dept: SURGERY | Facility: HOSPITAL | Age: 71
End: 2021-04-15
Payer: COMMERCIAL

## 2021-04-15 VITALS
TEMPERATURE: 97.4 F | DIASTOLIC BLOOD PRESSURE: 87 MMHG | SYSTOLIC BLOOD PRESSURE: 150 MMHG | HEART RATE: 79 BPM | RESPIRATION RATE: 18 BRPM | OXYGEN SATURATION: 100 %

## 2021-04-15 DIAGNOSIS — Z12.11 SPECIAL SCREENING FOR MALIGNANT NEOPLASMS, COLON: ICD-10-CM

## 2021-04-15 PROCEDURE — 88305 TISSUE EXAM BY PATHOLOGIST: CPT | Mod: TC | Performed by: SURGERY

## 2021-04-15 PROCEDURE — 272N000001 HC OR GENERAL SUPPLY STERILE: Performed by: SURGERY

## 2021-04-15 PROCEDURE — 370N000017 HC ANESTHESIA TECHNICAL FEE, PER MIN: Performed by: SURGERY

## 2021-04-15 PROCEDURE — 258N000003 HC RX IP 258 OP 636: Performed by: NURSE ANESTHETIST, CERTIFIED REGISTERED

## 2021-04-15 PROCEDURE — 45380 COLONOSCOPY AND BIOPSY: CPT | Mod: PT | Performed by: SURGERY

## 2021-04-15 PROCEDURE — 360N000075 HC SURGERY LEVEL 2, PER MIN: Performed by: SURGERY

## 2021-04-15 PROCEDURE — 45385 COLONOSCOPY W/LESION REMOVAL: CPT | Performed by: NURSE ANESTHETIST, CERTIFIED REGISTERED

## 2021-04-15 PROCEDURE — 250N000009 HC RX 250: Performed by: NURSE ANESTHETIST, CERTIFIED REGISTERED

## 2021-04-15 PROCEDURE — 999N000141 HC STATISTIC PRE-PROCEDURE NURSING ASSESSMENT: Performed by: SURGERY

## 2021-04-15 PROCEDURE — 710N000012 HC RECOVERY PHASE 2, PER MINUTE: Performed by: SURGERY

## 2021-04-15 PROCEDURE — 250N000011 HC RX IP 250 OP 636: Performed by: NURSE ANESTHETIST, CERTIFIED REGISTERED

## 2021-04-15 PROCEDURE — 45385 COLONOSCOPY W/LESION REMOVAL: CPT | Mod: PT | Performed by: SURGERY

## 2021-04-15 RX ORDER — NALOXONE HYDROCHLORIDE 0.4 MG/ML
0.4 INJECTION, SOLUTION INTRAMUSCULAR; INTRAVENOUS; SUBCUTANEOUS
Status: DISCONTINUED | OUTPATIENT
Start: 2021-04-15 | End: 2021-04-15 | Stop reason: HOSPADM

## 2021-04-15 RX ORDER — LIDOCAINE 40 MG/G
CREAM TOPICAL
Status: DISCONTINUED | OUTPATIENT
Start: 2021-04-15 | End: 2021-04-15 | Stop reason: HOSPADM

## 2021-04-15 RX ORDER — SODIUM CHLORIDE, SODIUM LACTATE, POTASSIUM CHLORIDE, CALCIUM CHLORIDE 600; 310; 30; 20 MG/100ML; MG/100ML; MG/100ML; MG/100ML
INJECTION, SOLUTION INTRAVENOUS CONTINUOUS
Status: DISCONTINUED | OUTPATIENT
Start: 2021-04-15 | End: 2021-04-15 | Stop reason: HOSPADM

## 2021-04-15 RX ORDER — SODIUM CHLORIDE, SODIUM LACTATE, POTASSIUM CHLORIDE, CALCIUM CHLORIDE 600; 310; 30; 20 MG/100ML; MG/100ML; MG/100ML; MG/100ML
INJECTION, SOLUTION INTRAVENOUS CONTINUOUS PRN
Status: DISCONTINUED | OUTPATIENT
Start: 2021-04-15 | End: 2021-04-15

## 2021-04-15 RX ORDER — PROPOFOL 10 MG/ML
INJECTION, EMULSION INTRAVENOUS PRN
Status: DISCONTINUED | OUTPATIENT
Start: 2021-04-15 | End: 2021-04-15

## 2021-04-15 RX ORDER — ONDANSETRON 4 MG/1
4 TABLET, ORALLY DISINTEGRATING ORAL EVERY 30 MIN PRN
Status: DISCONTINUED | OUTPATIENT
Start: 2021-04-15 | End: 2021-04-15 | Stop reason: HOSPADM

## 2021-04-15 RX ORDER — NALOXONE HYDROCHLORIDE 0.4 MG/ML
0.2 INJECTION, SOLUTION INTRAMUSCULAR; INTRAVENOUS; SUBCUTANEOUS
Status: DISCONTINUED | OUTPATIENT
Start: 2021-04-15 | End: 2021-04-15 | Stop reason: HOSPADM

## 2021-04-15 RX ORDER — FLUMAZENIL 0.1 MG/ML
0.2 INJECTION, SOLUTION INTRAVENOUS
Status: DISCONTINUED | OUTPATIENT
Start: 2021-04-15 | End: 2021-04-15 | Stop reason: HOSPADM

## 2021-04-15 RX ORDER — ONDANSETRON 2 MG/ML
4 INJECTION INTRAMUSCULAR; INTRAVENOUS EVERY 30 MIN PRN
Status: DISCONTINUED | OUTPATIENT
Start: 2021-04-15 | End: 2021-04-15 | Stop reason: HOSPADM

## 2021-04-15 RX ORDER — HYDRALAZINE HYDROCHLORIDE 20 MG/ML
2.5-5 INJECTION INTRAMUSCULAR; INTRAVENOUS EVERY 10 MIN PRN
Status: DISCONTINUED | OUTPATIENT
Start: 2021-04-15 | End: 2021-04-15 | Stop reason: HOSPADM

## 2021-04-15 RX ORDER — LIDOCAINE HYDROCHLORIDE 20 MG/ML
INJECTION, SOLUTION INFILTRATION; PERINEURAL PRN
Status: DISCONTINUED | OUTPATIENT
Start: 2021-04-15 | End: 2021-04-15

## 2021-04-15 RX ORDER — LABETALOL 20 MG/4 ML (5 MG/ML) INTRAVENOUS SYRINGE
10
Status: DISCONTINUED | OUTPATIENT
Start: 2021-04-15 | End: 2021-04-15 | Stop reason: HOSPADM

## 2021-04-15 RX ADMIN — PROPOFOL 40 MG: 10 INJECTION, EMULSION INTRAVENOUS at 09:26

## 2021-04-15 RX ADMIN — PROPOFOL 20 MG: 10 INJECTION, EMULSION INTRAVENOUS at 09:44

## 2021-04-15 RX ADMIN — SODIUM CHLORIDE, POTASSIUM CHLORIDE, SODIUM LACTATE AND CALCIUM CHLORIDE: 600; 310; 30; 20 INJECTION, SOLUTION INTRAVENOUS at 08:31

## 2021-04-15 RX ADMIN — PROPOFOL 50 MG: 10 INJECTION, EMULSION INTRAVENOUS at 09:41

## 2021-04-15 RX ADMIN — PROPOFOL 60 MG: 10 INJECTION, EMULSION INTRAVENOUS at 09:46

## 2021-04-15 RX ADMIN — PROPOFOL 40 MG: 10 INJECTION, EMULSION INTRAVENOUS at 09:48

## 2021-04-15 RX ADMIN — PROPOFOL 50 MG: 10 INJECTION, EMULSION INTRAVENOUS at 09:22

## 2021-04-15 RX ADMIN — PROPOFOL 50 MG: 10 INJECTION, EMULSION INTRAVENOUS at 09:20

## 2021-04-15 RX ADMIN — PROPOFOL 50 MG: 10 INJECTION, EMULSION INTRAVENOUS at 09:23

## 2021-04-15 RX ADMIN — PROPOFOL 50 MG: 10 INJECTION, EMULSION INTRAVENOUS at 09:36

## 2021-04-15 RX ADMIN — PROPOFOL 50 MG: 10 INJECTION, EMULSION INTRAVENOUS at 09:19

## 2021-04-15 RX ADMIN — PROPOFOL 40 MG: 10 INJECTION, EMULSION INTRAVENOUS at 09:31

## 2021-04-15 RX ADMIN — LIDOCAINE HYDROCHLORIDE 40 MG: 20 INJECTION, SOLUTION INFILTRATION; PERINEURAL at 09:19

## 2021-04-15 NOTE — OP NOTE
Christopher Valentine MRN# [id number]   YOB: 1950 Age: 71 year old      Date of Admission:  4/15/2021  Date of Service:   4/15/21    Primary care provider: Di Orr    PREOPERATIVE DIAGNOSIS:  Family history of colon cancer, personal history of polyps.        POSTOPERATIVE DIAGNOSIS:  Colon polyps x 13, Diverticulosis         PROCEDURE:  Colonoscopy with polypectomy            INDICATIONS:  Screening colonoscopy.      Specimen:   ID Type Source Tests Collected by Time Destination   A :  Polyp Large Intestine, Right/Ascending SURGICAL PATHOLOGY EXAM Sam Smith MD 4/15/2021  9:27 AM    B : transverse colon polyp x 2 Polyp Large Intestine, Transverse SURGICAL PATHOLOGY EXAM Sam Smith MD 4/15/2021  9:32 AM    C : colon polyp @ 75 cm Polyp Colon SURGICAL PATHOLOGY EXAM Sam Smith MD 4/15/2021  9:39 AM    D : colon polyp @ 70 cm Polyp Colon SURGICAL PATHOLOGY EXAM Sam Smith MD 4/15/2021  9:40 AM    E : colon polyp @ 65 cm Polyp Colon SURGICAL PATHOLOGY EXAM Sam Smith MD 4/15/2021  9:40 AM    F : colon polyp @ 80 cm Polyp Colon SURGICAL PATHOLOGY EXAM Sam Smith MD 4/15/2021  9:43 AM    G : colon polyp @ 45 cm Polyp Colon SURGICAL PATHOLOGY EXAM Sam Smith MD 4/15/2021  9:44 AM    H : colon polyp @ 35 cm Polyp Colon SURGICAL PATHOLOGY EXAM Sam Smith MD 4/15/2021  9:47 AM    I : colon polyp @ 30 cm Polyp Colon SURGICAL PATHOLOGY EXAM Sam Smith MD 4/15/2021  9:49 AM    J : rectum colon polyp Polyp Colon SURGICAL PATHOLOGY EXAM Sam Smith MD 4/15/2021  9:50 AM    K : colon rectum polyp #2 Polyp Colon SURGICAL PATHOLOGY EXAM Sam Smith MD 4/15/2021  9:52 AM        SURGEON: Sam Smith MD    DESCRIPTION OF PROCEDURE: Christopher Valentine was brought into the endoscopy suite and placed in the left lateral decubitus position. After preprocedural pause and attended monitored anesthesia was administered, the external anus was inspected  and was noted ot have enlarged hemorrhoids. Digital rectal exam somewhat tight orifice. The colonoscope was inserted and advanced under direct visualization to the level of the cecum which was identified by the appendiceal orifice and the ileocecal valve. The prep was excellent.. Upon slow withdrawal of the colonoscope, approximately 95% of the mucosa was directly visualized. There were multiple polyps removed with both hot snare, cold snare and biopsy forceps as described above all polyps were less than 1 cm in diameter. There was mild sigmoid diverticulosis.   The rest of the colon was without mucosal abnormality. There was no evidence of further polyps, inflammation, bleeding or AVMs. Retroflexion of the rectum showed a polyp that was removed with cold snare. The extra air was removed from the colon, and the colonoscope withdrawn. The patient tolerated the procedure well and was taken to postanesthesia care unit.     We invite the patient to return in 3-5 years for follow up screening evaluation, pending pathology related to polyps removed.    Sam Smith MD

## 2021-04-15 NOTE — ANESTHESIA CARE TRANSFER NOTE
Patient: Christopher Valentine    Procedure(s):  Colonoscopy, possible biopsy, possible polypectomy    Diagnosis: Special screening for malignant neoplasms, colon [Z12.11]  Diagnosis Additional Information: No value filed.    Anesthesia Type:   MAC     Note:      Level of Consciousness: drowsy  Oxygen Supplementation: nasal cannula    Independent Airway: airway patency satisfactory and stable  Dentition: dentition unchanged      Patient transferred to: Phase II    Handoff Report: Identifed the Patient, Identified the Reponsible Provider, Reviewed the pertinent medical history, Discussed the surgical course, Reviewed Intra-OP anesthesia mangement and issues during anesthesia, Set expectations for post-procedure period and Allowed opportunity for questions and acknowledgement of understanding      Vitals: (Last set prior to Anesthesia Care Transfer)  CRNA VITALS  4/15/2021 0924 - 4/15/2021 0955      4/15/2021             Pulse:  79    SpO2:  94 %        Electronically Signed By: JACINTA Raymundo CRNA  April 15, 2021  9:55 AM

## 2021-04-15 NOTE — H&P
Surgery Consult Clinic Note      RE: Christopher Valentine  : 1950        Chief Complaint:  Colon cancer screening  Personal history or colon polyps  1st degree relative with colon cancer  Chronic diarrhea    History of Present Illness:  I am seeing Christopher Valentine at the request of Di JOSUE for evaluation regarding meet and greet screening colonoscopy.  He had a colonoscopy in  with three large tubular adenomas and in  with three more tubular adenomas removed.  He was to have a short interval follow up, but declined.  His father was diagnosed with colon cancer.  He reports chronic diarrhea.  He denies blood in stool, weight loss, abdominal pain.  No previous abdominal surgeries.   He has no questions regarding  bowel prep.  Reports passing clear yellow liquid stools today.     He specifically denies fevers, chills, nausea, vomiting, chest pain, shortness of breath, palpitations, sore throat, cough, or generalized feeling ill.   He had a negative COVID 19 PCR test on 2021.    Medical history:  Past Medical History:   Diagnosis Date     Alcohol Abuse 2011     Dyslipidemia 2011       Surgical history:  Past Surgical History:   Procedure Laterality Date     ANKLE SURGERY      LT, hardware removal and ankle fusion; traumatic arthritis     COLONOSCOPY      with polypectomy       Family history:  Family History   Problem Relation Age of Onset     Cancer - colorectal Father         (cause of death)      Cancer Father      Alzheimer Disease Mother        Medications:  Prior to Admission medications    Medication Sig Start Date End Date Taking? Authorizing Provider   aspirin (ASA) 325 MG EC tablet Take 325 mg by mouth every 6 hours as needed for moderate pain   Yes Reported, Patient       Allergies:  The patient has No Known Allergies.  .  Social history:  Social History     Tobacco Use     Smoking status: Light Tobacco Smoker     Types: Cigarettes     Smokeless tobacco:  Never Used   Substance Use Topics     Alcohol use: Yes     Frequency: 4 or more times a week     Drinks per session: 1 or 2     Binge frequency: Never     Comment: beer daily      Marital status: .    Review of Systems:    Constitutional: Negative for fever, chills.   HENT: Negative for ear pain, congestion, sore throat, and ear discharge.    Eyes: Negative for blurred vision, double vision.   Respiratory: Negative for cough, hemoptysis, shortness of breath, wheezing and stridor.    Cardiovascular: Negative for chest pain, palpitations and orthopnea.   Gastrointestinal: Negative for heartburn, nausea, vomiting, abdominal pain and blood in stool.   Genitourinary: Negative for urgency, frequency   Musculoskeletal: Negative for myalgias, back pain and joint pain.   Neurological: Negative for tingling, speech change and headaches.   Endo/Heme/Allergies: Does not bruise/bleed easily.   Psychiatric/Behavioral: Negative for depression, suicidal ideas and hallucinations. The patient is not nervous/anxious.    Physical Examination:  /93   Temp 96.7  F (35.9  C) (Tympanic)   Resp 18   SpO2 97%     General: Alert and orientedx4, no acute distress, well-developed/well-nourished, ambulating without assistance  HEENT: normocephalic atraumatic, extraocular movements intact, sclerae anicteric, Trachea mideline  Chest:   Clear to auscultation bilaterally.  Cardiac: S1S2 , regular rate and rhythm without additional sounds  Abdomen: Soft, non-tender, non-distended  Extremities: Cursory exam unremarkable.  No peripheral edema noted.  Skin: Warm, dry, < 2 sec cap refill  Neuro: CN 2-12 grossly intact, no focal deficit, GCS 15  Psych: Pleasant, calm, asks appropriate questions      Assessment/Plan:  #1 Colonoscopy  #2 Personal history of tubular adenomatous polyps  #3 1st degree relative with colon cancer  #4 Chronic diarrhea  #5 Tobacco dependence  #6 Daily ETOH use    Christopher Valentine and I had a discussion about  colonoscopies.  The indications, risks, benefits, althernatives and technical aspects of whole colon colonoscopy were outlined with risks including, but not limited to, perforation, bleeding and inability to visualize entire colon.  Management of each was reviewed including the risk for life saving surgery and possible admittance to the hospital.  His questions were asked and answered.  We will proceed with colonoscopy with Dr. Smith as scheduled.  [facility] and [address]    [id number]    Referring Provider:  No referring provider defined for this encounter.     Primary Care Provider:  Di Orr

## 2021-04-15 NOTE — OR NURSING
Patient and responsible adult given discharge instructions with no questions regarding instructions. Felicitas score 10 Pain level 2 /10.  Discharged from unit via walking. Patient discharged to home.

## 2021-04-15 NOTE — ANESTHESIA POSTPROCEDURE EVALUATION
Patient: Christopher Valentine    Procedure(s):  Colonoscopy, with  polypectomy    Diagnosis:Special screening for malignant neoplasms, colon [Z12.11]  Diagnosis Additional Information: No value filed.    Anesthesia Type:  MAC    Note:  Disposition: Outpatient   Postop Pain Control: Uneventful            Sign Out: Well controlled pain   PONV: No   Neuro/Psych: Uneventful            Sign Out: Acceptable/Baseline neuro status   Airway/Respiratory: Uneventful            Sign Out: Acceptable/Baseline resp. status   CV/Hemodynamics: Uneventful            Sign Out: Acceptable CV status   Other NRE: NONE   DID A NON-ROUTINE EVENT OCCUR? No         Last vitals:  Vitals:    04/15/21 1015 04/15/21 1020 04/15/21 1025   BP: 126/73 139/97 (!) 155/109   Pulse: 75 82 79   Resp: 18 18 18   Temp:      SpO2: 97% 96% 100%       Last vitals prior to Anesthesia Care Transfer:  CRNA VITALS  4/15/2021 0924 - 4/15/2021 1024      4/15/2021             Pulse:  79    SpO2:  94 %          Electronically Signed By: JACINTA Raymundo CRNA  April 15, 2021  10:38 AM

## 2021-04-15 NOTE — BRIEF OP NOTE
[facility]    Brief Operative Note    Pre-operative diagnosis: Special screening for malignant neoplasms, colon [Z12.11]  Post-operative diagnosis Same as pre-operative diagnosis    Procedure: Procedure(s):  Colonoscopy, possible biopsy, possible polypectomy  Surgeon: Surgeon(s) and Role:     * Sam Smith MD - Primary  Anesthesia: Monitor Anesthesia Care   Estimated blood loss: Minimal  Drains: None  Specimens:   ID Type Source Tests Collected by Time Destination   A :  Polyp Large Intestine, Right/Ascending SURGICAL PATHOLOGY EXAM Sam Smith MD 4/15/2021  9:27 AM    B : transverse colon polyp x 2 Polyp Large Intestine, Transverse SURGICAL PATHOLOGY EXAM Sam Smith MD 4/15/2021  9:32 AM    C : colon polyp @ 75 cm Polyp Colon SURGICAL PATHOLOGY EXAM Sam Smith MD 4/15/2021  9:39 AM    D : colon polyp @ 70 cm Polyp Colon SURGICAL PATHOLOGY EXAM Sam Smith MD 4/15/2021  9:40 AM    E : colon polyp @ 65 cm Polyp Colon SURGICAL PATHOLOGY EXAM Sam Smith MD 4/15/2021  9:40 AM    F : colon polyp @ 80 cm Polyp Colon SURGICAL PATHOLOGY EXAM Sam Smith MD 4/15/2021  9:43 AM    G : colon polyp @ 45 cm Polyp Colon SURGICAL PATHOLOGY EXAM Sam Smith MD 4/15/2021  9:44 AM    H : colon polyp @ 35 cm Polyp Colon SURGICAL PATHOLOGY EXAM Sam Smith MD 4/15/2021  9:47 AM    I : colon polyp @ 30 cm Polyp Colon SURGICAL PATHOLOGY EXAM Sam Smith MD 4/15/2021  9:49 AM    J : rectum colon polyp Polyp Colon SURGICAL PATHOLOGY EXAM Sam Smith MD 4/15/2021  9:50 AM    K : colon rectum polyp #2 Polyp Colon SURGICAL PATHOLOGY EXAM Sam Smith MD 4/15/2021  9:52 AM      Findings:   Multiple polyps removed as above.   Complications: None.  Implants: * No implants in log *

## 2021-04-15 NOTE — DISCHARGE INSTRUCTIONS

## 2021-04-19 LAB — COPATH REPORT: NORMAL

## 2021-10-20 ENCOUNTER — TRANSFERRED RECORDS (OUTPATIENT)
Dept: HEALTH INFORMATION MANAGEMENT | Facility: HOSPITAL | Age: 71
End: 2021-10-20
Payer: COMMERCIAL

## 2021-11-03 ENCOUNTER — TELEPHONE (OUTPATIENT)
Dept: FAMILY MEDICINE | Facility: OTHER | Age: 71
End: 2021-11-03
Payer: COMMERCIAL

## 2021-11-03 NOTE — TELEPHONE ENCOUNTER
1:28 PM    Reason for Call: OVERBOOK/PREOP    Patient is having the following symptoms: Needs a PREOP for right knee replacement surgery on 12/15/21 with Dr Vasquez at [facility].    The patient is requesting an appointment with JERI Henry.    Was an appointment offered for this call? No  If yes : Appointment type              Date    Preferred method for responding to this message: Telephone Call  What is your phone number ? [phone]    If we cannot reach you directly, may we leave a detailed response at the number you provided? Yes    Can this message wait until your PCP/provider returns, if unavailable today? No,     Laura Rodriguez

## 2021-11-26 NOTE — PROGRESS NOTES
[facility] [place]  [address]  [place] [address]  Phone: [phone]  Primary Provider: Di Sanchez  Pre-op Performing Provider: DI SANCHEZ      PREOPERATIVE EVALUATION:  Today's date: 12/1/2021    Christopher Valentine is a 71 year old male who presents for a preoperative evaluation.    Surgical Information:  Surgery/Procedure: Right Total Knee Arthroplasty  Surgery Location: [facility]  Surgeon: Dr. Vasquez   Surgery Date: 12/15/2021  Time of Surgery: tbd   Where patient plans to recover: At home with family  Fax number for surgical facility: Note does not need to be faxed, will be available electronically in Epic.    Type of Anesthesia Anticipated: to be determined    Assessment & Plan     The proposed surgical procedure is considered INTERMEDIATE risk.    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  Is having a total knee replacement by Dr. Vasquez on December 15, 2021 .             Risks and Recommendations:  The patient has the following additional risks and recommendations for perioperative complications:   - No identified additional risk factors other than previously addressed    Medication Instructions:  Patient is on no chronic medications    RECOMMENDATION:  APPROVAL GIVEN to proceed with proposed procedure, without further diagnostic evaluation.    Review of external notes as documented above   Review of the result(s) of each unique test - MRI knee  Independent interpretation of a test performed by another physician/other qualified health care professional (not separately reported) - ortho   Discussion of management or test interpretation with external physician/other qualified healthcare professional/appropriate source - will be holding ASA before testing. Not taking any NSAIDS ever.   Diagnosis or treatment significantly limited by social determinants of health - hx of etoh but doesn't drink any more.     5  minutes  spent on the date of the encounter doing chart review, review of outside records, review of test results, interpretation of tests and documentation         Subjective     HPI related to upcoming procedure: has a severely arthritic  Knee. Going to go home the next day.doesn't need therapy or short tern nursing home placement.       Preop Questions 12/1/2021   1. Have you ever had a heart attack or stroke? No   2. Have you ever had surgery on your heart or blood vessels, such as a stent placement, a coronary artery bypass, or surgery on an artery in your head, neck, heart, or legs? No   3. Do you have chest pain with activity? No   4. Do you have a history of  heart failure? No   5. Do you currently have a cold, bronchitis or symptoms of other infection? No   6. Do you have a cough, shortness of breath, or wheezing? No   7. Do you or anyone in your family have previous history of blood clots? No   8. Do you or does anyone in your family have a serious bleeding problem such as prolonged bleeding following surgeries or cuts? No   9. Have you ever had problems with anemia or been told to take iron pills? No   10. Have you had any abnormal blood loss such as black, tarry or bloody stools? No   11. Have you ever had a blood transfusion? No   12. Are you willing to have a blood transfusion if it is medically needed before, during, or after your surgery? Yes   13. Have you or any of your relatives ever had problems with anesthesia? No   14. Do you have sleep apnea, excessive snoring or daytime drowsiness? No   15. Do you have any artifical heart valves or other implanted medical devices like a pacemaker, defibrillator, or continuous glucose monitor? No   16. Do you have artificial joints? No   17. Are you allergic to latex? No     Health Care Directive:  Patient does not have a Health Care Directive or Living Will: Discussed advance care planning with patient; however, patient declined at this time.    Preoperative Review of  :   reviewed - no record of controlled substances prescribed.      Status of Chronic Conditions:  See problem list for active medical problems.  Problems all longstanding and stable, except as noted/documented.  See ROS for pertinent symptoms related to these conditions.      Review of Systems  CONSTITUTIONAL: NEGATIVE for fever, chills, change in weight  INTEGUMENTARY/SKIN: NEGATIVE for worrisome rashes, moles or lesions  EYES: NEGATIVE for vision changes or irritation  ENT/MOUTH: NEGATIVE for ear, mouth and throat problems  RESP: NEGATIVE for significant cough or SOB  CV: NEGATIVE for chest pain, palpitations or peripheral edema  GI: NEGATIVE for nausea, abdominal pain, heartburn, or change in bowel habits  : NEGATIVE for frequency, dysuria, or hematuria  MUSCULOSKELETAL:see hpi   NEURO: NEGATIVE for weakness, dizziness or paresthesias  ENDOCRINE: NEGATIVE for temperature intolerance, skin/hair changes  HEME: NEGATIVE for bleeding problems  PSYCHIATRIC: NEGATIVE for changes in mood or affect    Patient Active Problem List    Diagnosis Date Noted     Special screening for malignant neoplasms, colon 03/05/2021     Priority: Medium     Uncomplicated alcohol dependence (H) 03/05/2021     Priority: Medium     Tobacco use disorder 03/05/2021     Priority: Medium     High cholesterol 03/05/2021     Priority: Medium      Past Medical History:   Diagnosis Date     Alcohol Abuse 08/02/2011     Dyslipidemia 08/16/2011     Past Surgical History:   Procedure Laterality Date     ANKLE SURGERY  2000    LT, hardware removal and ankle fusion; traumatic arthritis     COLONOSCOPY  2011    with polypectomy     COLONOSCOPY N/A 4/15/2021    Procedure: Colonoscopy, with  polypectomy;  Surgeon: Sam Smith MD;  Location: [facility]     Current Outpatient Medications   Medication Sig Dispense Refill     aspirin (ASA) 325 MG EC tablet Take 325 mg by mouth every 6 hours as needed for moderate pain (Patient not taking: Reported on  12/1/2021)         No Known Allergies     Social History     Tobacco Use     Smoking status: Light Tobacco Smoker     Types: Cigarettes     Smokeless tobacco: Never Used   Substance Use Topics     Alcohol use: Yes     Comment: beer daily      Family History   Problem Relation Age of Onset     Cancer - colorectal Father         (cause of death)      Cancer Father      Alzheimer Disease Mother      History   Drug Use Unknown         Objective     /70 (BP Location: Left arm, Patient Position: Sitting, Cuff Size: Adult Regular)   Pulse 91   Temp 98.4  F (36.9  C) (Tympanic)   Resp 20   Wt 85.7 kg (189 lb)   SpO2 96%   BMI 28.49 kg/m      Physical Exam    GENERAL APPEARANCE: healthy, alert and no distress     EYES: EOMI,  PERRL     HENT: ear canals and TM's normal and nose and mouth without ulcers or lesions     NECK: no adenopathy, no asymmetry, masses, or scars and thyroid normal to palpation     RESP: lungs clear to auscultation - no rales, rhonchi or wheezes     CV: regular rates and rhythm, normal S1 S2, no S3 or S4 and no murmur, click or rub     ABDOMEN:  soft, nontender, no HSM or masses and bowel sounds normal     MS: large swollen right knee in a brace limped gait and compensated gate.      SKIN: no suspicious lesions or rashes     NEURO: Normal strength and tone, sensory exam grossly normal, mentation intact and speech normal     PSYCH: mentation appears normal. and affect normal/bright     LYMPHATICS: No cervical adenopathy    Recent Labs   Lab Test 03/16/21  1608 03/05/21  1439   HGB 16.3 16.2   PLT  --  244   NA  --  135   POTASSIUM  --  3.8   CR  --  0.72      Results for orders placed or performed in visit on 12/01/21   Comprehensive metabolic panel (BMP + Alb, Alk Phos, ALT, AST, Total. Bili, TP)     Status: Abnormal   Result Value Ref Range    Sodium 134 133 - 144 mmol/L    Potassium 3.8 3.4 - 5.3 mmol/L    Chloride 103 94 - 109 mmol/L    Carbon Dioxide (CO2) 24 20 - 32 mmol/L    Anion Gap 7  3 - 14 mmol/L    Urea Nitrogen 9 7 - 30 mg/dL    Creatinine 0.78 0.66 - 1.25 mg/dL    Calcium 8.9 8.5 - 10.1 mg/dL    Glucose 103 (H) 70 - 99 mg/dL    Alkaline Phosphatase 99 40 - 150 U/L    AST 35 0 - 45 U/L    ALT 30 0 - 70 U/L    Protein Total 7.6 6.8 - 8.8 g/dL    Albumin 3.8 3.4 - 5.0 g/dL    Bilirubin Total 1.2 0.2 - 1.3 mg/dL    GFR Estimate >90 >60 mL/min/1.73m2   UA reflex to Microscopic and Culture - [place]     Status: Abnormal    Specimen: Urine, Clean Catch   Result Value Ref Range    Color Urine Light Yellow Colorless, Straw, Light Yellow, Yellow    Appearance Urine Clear Clear    Glucose Urine Negative Negative mg/dL    Bilirubin Urine Negative Negative    Ketones Urine Negative Negative mg/dL    Specific Gravity Urine 1.011 1.003 - 1.035    Blood Urine Trace (A) Negative    pH Urine 5.5 4.7 - 8.0    Protein Albumin Urine Negative Negative mg/dL    Urobilinogen Urine Normal Normal, 2.0 mg/dL    Nitrite Urine Negative Negative    Leukocyte Esterase Urine Negative Negative    Mucus Urine Present (A) None Seen /LPF    Sperm Urine Present (A) None Seen /HPF    RBC Urine 2 <=2 /HPF    WBC Urine 1 <=5 /HPF    Squamous Epithelials Urine 0 <=1 /HPF    Narrative    Urine Culture not indicated   ESR: Erythrocyte sedimentation rate     Status: Normal   Result Value Ref Range    Erythrocyte Sedimentation Rate 9 0 - 20 mm/hr   CBC with platelets and differential     Status: None   Result Value Ref Range    WBC Count 6.4 4.0 - 11.0 10e3/uL    RBC Count 4.87 4.40 - 5.90 10e6/uL    Hemoglobin 16.0 13.3 - 17.7 g/dL    Hematocrit 45.2 40.0 - 53.0 %    MCV 93 78 - 100 fL    MCH 32.9 26.5 - 33.0 pg    MCHC 35.4 31.5 - 36.5 g/dL    RDW 11.9 10.0 - 15.0 %    Platelet Count 213 150 - 450 10e3/uL    % Neutrophils 60 %    % Lymphocytes 26 %    % Monocytes 11 %    % Eosinophils 2 %    % Basophils 1 %    % Immature Granulocytes 0 %    NRBCs per 100 WBC 0 <1 /100    Absolute Neutrophils 3.9 1.6 - 8.3 10e3/uL    Absolute  Lymphocytes 1.6 0.8 - 5.3 10e3/uL    Absolute Monocytes 0.7 0.0 - 1.3 10e3/uL    Absolute Eosinophils 0.1 0.0 - 0.7 10e3/uL    Absolute Basophils 0.0 0.0 - 0.2 10e3/uL    Absolute Immature Granulocytes 0.0 <=0.4 10e3/uL    Absolute NRBCs 0.0 10e3/uL   CBC with platelets and differential     Status: None    Narrative    The following orders were created for panel order CBC with platelets and differential.  Procedure                               Abnormality         Status                     ---------                               -----------         ------                     CBC with platelets and d...[[id number]]                      Final result                 Please view results for these tests on the individual orders.       Diagnostics:  He is going to have labs and EKG    EKG: appears normal, NSR, normal axis, normal intervals, no acute ST/T changes c/w ischemia, no LVH by voltage criteria    Revised Cardiac Risk Index (RCRI):  The patient has the following serious cardiovascular risks for perioperative complications:   - No serious cardiac risks = 0 points     RCRI Interpretation: 1 point: Class II (low risk - 0.9% complication rate)           Signed Electronically by: JOSELO Suarez  Copy of this evaluation report is provided to requesting physician.

## 2021-11-26 NOTE — H&P (VIEW-ONLY)
[facility] - [place]  [address]  [place] [address]  Phone: [phone]  Primary Provider: Di Sanchez  Pre-op Performing Provider: DI SANCHEZ      PREOPERATIVE EVALUATION:  Today's date: 12/1/2021    Christopher Valentine is a 71 year old male who presents for a preoperative evaluation.    Surgical Information:  Surgery/Procedure: Right Total Knee Arthroplasty  Surgery Location: [facility]  Surgeon: Dr. Vasquez   Surgery Date: 12/15/2021  Time of Surgery: tbd   Where patient plans to recover: At home with family  Fax number for surgical facility: Note does not need to be faxed, will be available electronically in Epic.    Type of Anesthesia Anticipated: to be determined    Assessment & Plan     The proposed surgical procedure is considered INTERMEDIATE risk.    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  Is having a total knee replacement by Dr. Vasquez on December 15, 2021 .             Risks and Recommendations:  The patient has the following additional risks and recommendations for perioperative complications:   - No identified additional risk factors other than previously addressed    Medication Instructions:  Patient is on no chronic medications    RECOMMENDATION:  APPROVAL GIVEN to proceed with proposed procedure, without further diagnostic evaluation.    Review of external notes as documented above   Review of the result(s) of each unique test - MRI knee  Independent interpretation of a test performed by another physician/other qualified health care professional (not separately reported) - ortho   Discussion of management or test interpretation with external physician/other qualified healthcare professional/appropriate source - will be holding ASA before testing. Not taking any NSAIDS ever.   Diagnosis or treatment significantly limited by social determinants of health - hx of etoh but doesn't drink any more.     5  minutes  spent on the date of the encounter doing chart review, review of outside records, review of test results, interpretation of tests and documentation         Subjective     HPI related to upcoming procedure: has a severely arthritic  Knee. Going to go home the next day.doesn't need therapy or short tern nursing home placement.       Preop Questions 12/1/2021   1. Have you ever had a heart attack or stroke? No   2. Have you ever had surgery on your heart or blood vessels, such as a stent placement, a coronary artery bypass, or surgery on an artery in your head, neck, heart, or legs? No   3. Do you have chest pain with activity? No   4. Do you have a history of  heart failure? No   5. Do you currently have a cold, bronchitis or symptoms of other infection? No   6. Do you have a cough, shortness of breath, or wheezing? No   7. Do you or anyone in your family have previous history of blood clots? No   8. Do you or does anyone in your family have a serious bleeding problem such as prolonged bleeding following surgeries or cuts? No   9. Have you ever had problems with anemia or been told to take iron pills? No   10. Have you had any abnormal blood loss such as black, tarry or bloody stools? No   11. Have you ever had a blood transfusion? No   12. Are you willing to have a blood transfusion if it is medically needed before, during, or after your surgery? Yes   13. Have you or any of your relatives ever had problems with anesthesia? No   14. Do you have sleep apnea, excessive snoring or daytime drowsiness? No   15. Do you have any artifical heart valves or other implanted medical devices like a pacemaker, defibrillator, or continuous glucose monitor? No   16. Do you have artificial joints? No   17. Are you allergic to latex? No     Health Care Directive:  Patient does not have a Health Care Directive or Living Will: Discussed advance care planning with patient; however, patient declined at this time.    Preoperative Review of  :   reviewed - no record of controlled substances prescribed.      Status of Chronic Conditions:  See problem list for active medical problems.  Problems all longstanding and stable, except as noted/documented.  See ROS for pertinent symptoms related to these conditions.      Review of Systems  CONSTITUTIONAL: NEGATIVE for fever, chills, change in weight  INTEGUMENTARY/SKIN: NEGATIVE for worrisome rashes, moles or lesions  EYES: NEGATIVE for vision changes or irritation  ENT/MOUTH: NEGATIVE for ear, mouth and throat problems  RESP: NEGATIVE for significant cough or SOB  CV: NEGATIVE for chest pain, palpitations or peripheral edema  GI: NEGATIVE for nausea, abdominal pain, heartburn, or change in bowel habits  : NEGATIVE for frequency, dysuria, or hematuria  MUSCULOSKELETAL:see hpi   NEURO: NEGATIVE for weakness, dizziness or paresthesias  ENDOCRINE: NEGATIVE for temperature intolerance, skin/hair changes  HEME: NEGATIVE for bleeding problems  PSYCHIATRIC: NEGATIVE for changes in mood or affect    Patient Active Problem List    Diagnosis Date Noted     Special screening for malignant neoplasms, colon 03/05/2021     Priority: Medium     Uncomplicated alcohol dependence (H) 03/05/2021     Priority: Medium     Tobacco use disorder 03/05/2021     Priority: Medium     High cholesterol 03/05/2021     Priority: Medium      Past Medical History:   Diagnosis Date     Alcohol Abuse 08/02/2011     Dyslipidemia 08/16/2011     Past Surgical History:   Procedure Laterality Date     ANKLE SURGERY  2000    LT, hardware removal and ankle fusion; traumatic arthritis     COLONOSCOPY  2011    with polypectomy     COLONOSCOPY N/A 4/15/2021    Procedure: Colonoscopy, with  polypectomy;  Surgeon: Sam Smith MD;  Location: [facility]     Current Outpatient Medications   Medication Sig Dispense Refill     aspirin (ASA) 325 MG EC tablet Take 325 mg by mouth every 6 hours as needed for moderate pain (Patient not taking: Reported on  12/1/2021)         No Known Allergies     Social History     Tobacco Use     Smoking status: Light Tobacco Smoker     Types: Cigarettes     Smokeless tobacco: Never Used   Substance Use Topics     Alcohol use: Yes     Comment: beer daily      Family History   Problem Relation Age of Onset     Cancer - colorectal Father         (cause of death)      Cancer Father      Alzheimer Disease Mother      History   Drug Use Unknown         Objective     /70 (BP Location: Left arm, Patient Position: Sitting, Cuff Size: Adult Regular)   Pulse 91   Temp 98.4  F (36.9  C) (Tympanic)   Resp 20   Wt 85.7 kg (189 lb)   SpO2 96%   BMI 28.49 kg/m      Physical Exam    GENERAL APPEARANCE: healthy, alert and no distress     EYES: EOMI,  PERRL     HENT: ear canals and TM's normal and nose and mouth without ulcers or lesions     NECK: no adenopathy, no asymmetry, masses, or scars and thyroid normal to palpation     RESP: lungs clear to auscultation - no rales, rhonchi or wheezes     CV: regular rates and rhythm, normal S1 S2, no S3 or S4 and no murmur, click or rub     ABDOMEN:  soft, nontender, no HSM or masses and bowel sounds normal     MS: large swollen right knee in a brace limped gait and compensated gate.      SKIN: no suspicious lesions or rashes     NEURO: Normal strength and tone, sensory exam grossly normal, mentation intact and speech normal     PSYCH: mentation appears normal. and affect normal/bright     LYMPHATICS: No cervical adenopathy    Recent Labs   Lab Test 03/16/21  1608 03/05/21  1439   HGB 16.3 16.2   PLT  --  244   NA  --  135   POTASSIUM  --  3.8   CR  --  0.72      Results for orders placed or performed in visit on 12/01/21   Comprehensive metabolic panel (BMP + Alb, Alk Phos, ALT, AST, Total. Bili, TP)     Status: Abnormal   Result Value Ref Range    Sodium 134 133 - 144 mmol/L    Potassium 3.8 3.4 - 5.3 mmol/L    Chloride 103 94 - 109 mmol/L    Carbon Dioxide (CO2) 24 20 - 32 mmol/L    Anion Gap 7  3 - 14 mmol/L    Urea Nitrogen 9 7 - 30 mg/dL    Creatinine 0.78 0.66 - 1.25 mg/dL    Calcium 8.9 8.5 - 10.1 mg/dL    Glucose 103 (H) 70 - 99 mg/dL    Alkaline Phosphatase 99 40 - 150 U/L    AST 35 0 - 45 U/L    ALT 30 0 - 70 U/L    Protein Total 7.6 6.8 - 8.8 g/dL    Albumin 3.8 3.4 - 5.0 g/dL    Bilirubin Total 1.2 0.2 - 1.3 mg/dL    GFR Estimate >90 >60 mL/min/1.73m2   UA reflex to Microscopic and Culture - [place]     Status: Abnormal    Specimen: Urine, Clean Catch   Result Value Ref Range    Color Urine Light Yellow Colorless, Straw, Light Yellow, Yellow    Appearance Urine Clear Clear    Glucose Urine Negative Negative mg/dL    Bilirubin Urine Negative Negative    Ketones Urine Negative Negative mg/dL    Specific Gravity Urine 1.011 1.003 - 1.035    Blood Urine Trace (A) Negative    pH Urine 5.5 4.7 - 8.0    Protein Albumin Urine Negative Negative mg/dL    Urobilinogen Urine Normal Normal, 2.0 mg/dL    Nitrite Urine Negative Negative    Leukocyte Esterase Urine Negative Negative    Mucus Urine Present (A) None Seen /LPF    Sperm Urine Present (A) None Seen /HPF    RBC Urine 2 <=2 /HPF    WBC Urine 1 <=5 /HPF    Squamous Epithelials Urine 0 <=1 /HPF    Narrative    Urine Culture not indicated   ESR: Erythrocyte sedimentation rate     Status: Normal   Result Value Ref Range    Erythrocyte Sedimentation Rate 9 0 - 20 mm/hr   CBC with platelets and differential     Status: None   Result Value Ref Range    WBC Count 6.4 4.0 - 11.0 10e3/uL    RBC Count 4.87 4.40 - 5.90 10e6/uL    Hemoglobin 16.0 13.3 - 17.7 g/dL    Hematocrit 45.2 40.0 - 53.0 %    MCV 93 78 - 100 fL    MCH 32.9 26.5 - 33.0 pg    MCHC 35.4 31.5 - 36.5 g/dL    RDW 11.9 10.0 - 15.0 %    Platelet Count 213 150 - 450 10e3/uL    % Neutrophils 60 %    % Lymphocytes 26 %    % Monocytes 11 %    % Eosinophils 2 %    % Basophils 1 %    % Immature Granulocytes 0 %    NRBCs per 100 WBC 0 <1 /100    Absolute Neutrophils 3.9 1.6 - 8.3 10e3/uL    Absolute  Lymphocytes 1.6 0.8 - 5.3 10e3/uL    Absolute Monocytes 0.7 0.0 - 1.3 10e3/uL    Absolute Eosinophils 0.1 0.0 - 0.7 10e3/uL    Absolute Basophils 0.0 0.0 - 0.2 10e3/uL    Absolute Immature Granulocytes 0.0 <=0.4 10e3/uL    Absolute NRBCs 0.0 10e3/uL   CBC with platelets and differential     Status: None    Narrative    The following orders were created for panel order CBC with platelets and differential.  Procedure                               Abnormality         Status                     ---------                               -----------         ------                     CBC with platelets and d...[[id number]]                      Final result                 Please view results for these tests on the individual orders.       Diagnostics:  He is going to have labs and EKG    EKG: appears normal, NSR, normal axis, normal intervals, no acute ST/T changes c/w ischemia, no LVH by voltage criteria    Revised Cardiac Risk Index (RCRI):  The patient has the following serious cardiovascular risks for perioperative complications:   - No serious cardiac risks = 0 points     RCRI Interpretation: 1 point: Class II (low risk - 0.9% complication rate)           Signed Electronically by: JOSELO Suarez  Copy of this evaluation report is provided to requesting physician.

## 2021-12-01 ENCOUNTER — OFFICE VISIT (OUTPATIENT)
Dept: FAMILY MEDICINE | Facility: OTHER | Age: 71
End: 2021-12-01
Attending: PHYSICIAN ASSISTANT
Payer: COMMERCIAL

## 2021-12-01 VITALS
RESPIRATION RATE: 20 BRPM | TEMPERATURE: 98.4 F | DIASTOLIC BLOOD PRESSURE: 70 MMHG | HEART RATE: 91 BPM | SYSTOLIC BLOOD PRESSURE: 136 MMHG | WEIGHT: 189 LBS | OXYGEN SATURATION: 96 % | BODY MASS INDEX: 28.49 KG/M2

## 2021-12-01 DIAGNOSIS — Z01.818 PREOP GENERAL PHYSICAL EXAM: Primary | ICD-10-CM

## 2021-12-01 LAB
ALBUMIN SERPL-MCNC: 3.8 G/DL (ref 3.4–5)
ALBUMIN UR-MCNC: NEGATIVE MG/DL
ALP SERPL-CCNC: 99 U/L (ref 40–150)
ALT SERPL W P-5'-P-CCNC: 30 U/L (ref 0–70)
ANION GAP SERPL CALCULATED.3IONS-SCNC: 7 MMOL/L (ref 3–14)
APPEARANCE UR: CLEAR
AST SERPL W P-5'-P-CCNC: 35 U/L (ref 0–45)
BASOPHILS # BLD AUTO: 0 10E3/UL (ref 0–0.2)
BASOPHILS NFR BLD AUTO: 1 %
BILIRUB SERPL-MCNC: 1.2 MG/DL (ref 0.2–1.3)
BILIRUB UR QL STRIP: NEGATIVE
BUN SERPL-MCNC: 9 MG/DL (ref 7–30)
CALCIUM SERPL-MCNC: 8.9 MG/DL (ref 8.5–10.1)
CHLORIDE BLD-SCNC: 103 MMOL/L (ref 94–109)
CO2 SERPL-SCNC: 24 MMOL/L (ref 20–32)
COLOR UR AUTO: ABNORMAL
CREAT SERPL-MCNC: 0.78 MG/DL (ref 0.66–1.25)
EOSINOPHIL # BLD AUTO: 0.1 10E3/UL (ref 0–0.7)
EOSINOPHIL NFR BLD AUTO: 2 %
ERYTHROCYTE [DISTWIDTH] IN BLOOD BY AUTOMATED COUNT: 11.9 % (ref 10–15)
ERYTHROCYTE [SEDIMENTATION RATE] IN BLOOD BY WESTERGREN METHOD: 9 MM/HR (ref 0–20)
GFR SERPL CREATININE-BSD FRML MDRD: >90 ML/MIN/1.73M2
GLUCOSE BLD-MCNC: 103 MG/DL (ref 70–99)
GLUCOSE UR STRIP-MCNC: NEGATIVE MG/DL
HCT VFR BLD AUTO: 45.2 % (ref 40–53)
HGB BLD-MCNC: 16 G/DL (ref 13.3–17.7)
HGB UR QL STRIP: ABNORMAL
IMM GRANULOCYTES # BLD: 0 10E3/UL
IMM GRANULOCYTES NFR BLD: 0 %
KETONES UR STRIP-MCNC: NEGATIVE MG/DL
LEUKOCYTE ESTERASE UR QL STRIP: NEGATIVE
LYMPHOCYTES # BLD AUTO: 1.6 10E3/UL (ref 0.8–5.3)
LYMPHOCYTES NFR BLD AUTO: 26 %
MCH RBC QN AUTO: 32.9 PG (ref 26.5–33)
MCHC RBC AUTO-ENTMCNC: 35.4 G/DL (ref 31.5–36.5)
MCV RBC AUTO: 93 FL (ref 78–100)
MONOCYTES # BLD AUTO: 0.7 10E3/UL (ref 0–1.3)
MONOCYTES NFR BLD AUTO: 11 %
MUCOUS THREADS #/AREA URNS LPF: PRESENT /LPF
NEUTROPHILS # BLD AUTO: 3.9 10E3/UL (ref 1.6–8.3)
NEUTROPHILS NFR BLD AUTO: 60 %
NITRATE UR QL: NEGATIVE
NRBC # BLD AUTO: 0 10E3/UL
NRBC BLD AUTO-RTO: 0 /100
PH UR STRIP: 5.5 [PH] (ref 4.7–8)
PLATELET # BLD AUTO: 213 10E3/UL (ref 150–450)
POTASSIUM BLD-SCNC: 3.8 MMOL/L (ref 3.4–5.3)
PROT SERPL-MCNC: 7.6 G/DL (ref 6.8–8.8)
RBC # BLD AUTO: 4.87 10E6/UL (ref 4.4–5.9)
RBC URINE: 2 /HPF
SODIUM SERPL-SCNC: 134 MMOL/L (ref 133–144)
SP GR UR STRIP: 1.01 (ref 1–1.03)
SPERM #/AREA URNS HPF: PRESENT /HPF
SQUAMOUS EPITHELIAL: 0 /HPF
UROBILINOGEN UR STRIP-MCNC: NORMAL MG/DL
WBC # BLD AUTO: 6.4 10E3/UL (ref 4–11)
WBC URINE: 1 /HPF

## 2021-12-01 PROCEDURE — 80053 COMPREHEN METABOLIC PANEL: CPT | Mod: ZL | Performed by: PHYSICIAN ASSISTANT

## 2021-12-01 PROCEDURE — 93005 ELECTROCARDIOGRAM TRACING: CPT

## 2021-12-01 PROCEDURE — 99214 OFFICE O/P EST MOD 30 MIN: CPT | Performed by: PHYSICIAN ASSISTANT

## 2021-12-01 PROCEDURE — 81003 URINALYSIS AUTO W/O SCOPE: CPT | Mod: ZL | Performed by: PHYSICIAN ASSISTANT

## 2021-12-01 PROCEDURE — 93010 ELECTROCARDIOGRAM REPORT: CPT | Mod: 77 | Performed by: INTERNAL MEDICINE

## 2021-12-01 PROCEDURE — G0463 HOSPITAL OUTPT CLINIC VISIT: HCPCS | Mod: 25

## 2021-12-01 PROCEDURE — 85025 COMPLETE CBC W/AUTO DIFF WBC: CPT | Mod: ZL | Performed by: PHYSICIAN ASSISTANT

## 2021-12-01 PROCEDURE — 85652 RBC SED RATE AUTOMATED: CPT | Mod: ZL | Performed by: PHYSICIAN ASSISTANT

## 2021-12-01 PROCEDURE — 36415 COLL VENOUS BLD VENIPUNCTURE: CPT | Mod: ZL | Performed by: PHYSICIAN ASSISTANT

## 2021-12-01 ASSESSMENT — PAIN SCALES - GENERAL: PAINLEVEL: MODERATE PAIN (5)

## 2021-12-01 NOTE — NURSING NOTE
"Chief Complaint   Patient presents with     Pre-Op Exam       Initial /70 (BP Location: Left arm, Patient Position: Sitting, Cuff Size: Adult Regular)   Pulse 91   Temp 98.4  F (36.9  C) (Tympanic)   Resp 20   Wt 85.7 kg (189 lb)   SpO2 96%   BMI 28.49 kg/m   Estimated body mass index is 28.49 kg/m  as calculated from the following:    Height as of 4/2/21: 1.735 m (5' 8.3\").    Weight as of this encounter: 85.7 kg (189 lb).  Medication Reconciliation: complete  Arthur Vu LPN  "

## 2021-12-07 NOTE — OR NURSING
email sent to total joint replacement team as follows;    We have Christopher Valentine : 52 coming 12/15 for RTKA with Dr. Vasquez, PCP Enrique.  Patient received the total joint replacement education packet on 10/26 and will bring in his book the day of surgery.  He states he has read his material and does not have any questions at this time.  He states he is aware he will be going home the same day of surgery and will have his wife to help him.  He has three steps to enter one story home with basement.  He would only need to go into basement to keep fire going but he states his wife can do that for couple weeks.  He has a walker, cane, crutches.  He has a sink and towel bar to hang onto to get up from toilet.  Instructed towel bars are not secure and he states it has fallen off the wall before.  He states he will not use it with knee replacement.  Encouraged high rise toilet seat.  He has a tub/shower combo.  Has 2 dogs and one cat and his wife will take care of the animals.  Reviewed Iceman Cooler and how to use.  He states he has old pop bottles he can use for ice in freezer.  He is aware PT will come and walk him day of surgery.    Thank you,  Patricia Medel R.N.  Pre-Anesthesia Testing Surgical Education  [facility]

## 2021-12-08 ENCOUNTER — ANESTHESIA EVENT (OUTPATIENT)
Dept: SURGERY | Facility: HOSPITAL | Age: 71
End: 2021-12-08
Payer: COMMERCIAL

## 2021-12-08 NOTE — ANESTHESIA PREPROCEDURE EVALUATION
Anesthesia Pre-Procedure Evaluation    Patient: Christopher Valentine   MRN: [id number] : 1950        Preoperative Diagnosis: Right knee pain [M25.561]    Procedure : Procedure(s):  RIGHT TOTAL KNEE ARTHROPLASTY          Past Medical History:   Diagnosis Date     Alcohol Abuse 2011     Dyslipidemia 2011      Past Surgical History:   Procedure Laterality Date     ANKLE SURGERY      LT, hardware removal and ankle fusion; traumatic arthritis     COLONOSCOPY      with polypectomy     COLONOSCOPY N/A 4/15/2021    Procedure: Colonoscopy, with  polypectomy;  Surgeon: Sam Smith MD;  Location: [facility]      No Known Allergies   Social History     Tobacco Use     Smoking status: Light Tobacco Smoker     Types: Cigarettes     Smokeless tobacco: Never Used   Substance Use Topics     Alcohol use: Yes     Comment: beer daily       Wt Readings from Last 1 Encounters:   21 85.7 kg (189 lb)        Anesthesia Evaluation   Pt has had prior anesthetic. Type: Regional.    History of anesthetic complications  - PONV.      ROS/MED HX  ENT/Pulmonary:     (+) tobacco use (1 or 2 cigarettes per day), Current use,     Neurologic:  - neg neurologic ROS     Cardiovascular:     (+) Dyslipidemia -----Taking blood thinners Instructions Given to patient: , has not taken in > 1 week.     METS/Exercise Tolerance: >4 METS    Hematologic:       Musculoskeletal: Comment: Pulled back muscle      GI/Hepatic:     (+) GERD, Asymptomatic on medication,     Renal/Genitourinary:  - neg Renal ROS     Endo:       Psychiatric/Substance Use:     (+) alcohol abuse     Infectious Disease:  - neg infectious disease ROS     Malignancy:  - neg malignancy ROS     Other:  - neg other ROS          Physical Exam    Airway  airway exam normal      Mallampati: II   TM distance: > 3 FB   Neck ROM: full   Mouth opening: > 3 cm    Respiratory Devices and Support         Dental       (+) upper dentures and lower  dentures      Cardiovascular          Rhythm and rate: regular and normal     Pulmonary   pulmonary exam normal        breath sounds clear to auscultation           OUTSIDE LABS:  CBC:   Lab Results   Component Value Date    WBC 6.4 12/01/2021    WBC 6.6 03/05/2021    HGB 16.0 12/01/2021    HGB 16.3 03/16/2021    HCT 45.2 12/01/2021    HCT 45.3 03/05/2021     12/01/2021     03/05/2021     BMP:   Lab Results   Component Value Date     12/01/2021     03/05/2021    POTASSIUM 3.8 12/01/2021    POTASSIUM 3.8 03/05/2021    CHLORIDE 103 12/01/2021    CHLORIDE 104 03/05/2021    CO2 24 12/01/2021    CO2 25 03/05/2021    BUN 9 12/01/2021    BUN 8 03/05/2021    CR 0.78 12/01/2021    CR 0.72 03/05/2021     (H) 12/01/2021     (H) 03/05/2021     COAGS: No results found for: PTT, INR, FIBR  POC: No results found for: BGM, HCG, HCGS  HEPATIC:   Lab Results   Component Value Date    ALBUMIN 3.8 12/01/2021    PROTTOTAL 7.6 12/01/2021    ALT 30 12/01/2021    AST 35 12/01/2021     (H) 03/05/2021    ALKPHOS 99 12/01/2021    BILITOTAL 1.2 12/01/2021     OTHER:   Lab Results   Component Value Date    TOBIAS 8.9 12/01/2021    TSH 1.31 03/05/2021    SED 9 12/01/2021       Anesthesia Plan    ASA Status:  2   NPO Status:  NPO Appropriate    Anesthesia Type: Spinal.              Consents    Anesthesia Plan(s) and associated risks, benefits, and realistic alternatives discussed. Questions answered and patient/representative(s) expressed understanding.    - Discussed:     - Discussed with:  Patient      - Extended Intubation/Ventilatory Support Discussed: No.      - Patient is DNR/DNI Status: No    Use of blood products discussed: Yes.     - Discussed with: Patient.     - Consented: consented to blood products (verbal)            Reason for refusal: other.     Postoperative Care    Pain management: Neuraxial analgesia, IV analgesics, Peripheral nerve block (Single Shot).   PONV prophylaxis: Ondansetron  (or other 5HT-3)     Comments:    Other Comments: H and P date 12/1/21    Discussed risks and benefits of spinal anesthetic and nerve blocks with patient including itching, sore back, infection, hematoma, spinal headache, CV complications, nerve damage, inability to place, conversion to general anesthesia, loss of airway, and death. Pt wishes to proceed.             JACINTA Gleason CRNA

## 2021-12-11 ENCOUNTER — OFFICE VISIT (OUTPATIENT)
Dept: FAMILY MEDICINE | Facility: OTHER | Age: 71
End: 2021-12-11
Attending: ORTHOPAEDIC SURGERY
Payer: COMMERCIAL

## 2021-12-11 DIAGNOSIS — Z20.822 COVID-19 RULED OUT: Primary | ICD-10-CM

## 2021-12-11 PROCEDURE — U0003 INFECTIOUS AGENT DETECTION BY NUCLEIC ACID (DNA OR RNA); SEVERE ACUTE RESPIRATORY SYNDROME CORONAVIRUS 2 (SARS-COV-2) (CORONAVIRUS DISEASE [COVID-19]), AMPLIFIED PROBE TECHNIQUE, MAKING USE OF HIGH THROUGHPUT TECHNOLOGIES AS DESCRIBED BY CMS-2020-01-R: HCPCS | Mod: ZL

## 2021-12-12 LAB — SARS-COV-2 RNA RESP QL NAA+PROBE: NEGATIVE

## 2021-12-15 ENCOUNTER — APPOINTMENT (OUTPATIENT)
Dept: GENERAL RADIOLOGY | Facility: HOSPITAL | Age: 71
End: 2021-12-15
Attending: ORTHOPAEDIC SURGERY
Payer: COMMERCIAL

## 2021-12-15 ENCOUNTER — ANESTHESIA (OUTPATIENT)
Dept: SURGERY | Facility: HOSPITAL | Age: 71
End: 2021-12-15
Payer: COMMERCIAL

## 2021-12-15 ENCOUNTER — HOSPITAL ENCOUNTER (OUTPATIENT)
Facility: HOSPITAL | Age: 71
Discharge: HOME | End: 2021-12-15
Attending: ORTHOPAEDIC SURGERY | Admitting: ORTHOPAEDIC SURGERY
Payer: COMMERCIAL

## 2021-12-15 ENCOUNTER — APPOINTMENT (OUTPATIENT)
Dept: PHYSICAL THERAPY | Facility: HOSPITAL | Age: 71
End: 2021-12-15
Attending: ORTHOPAEDIC SURGERY
Payer: COMMERCIAL

## 2021-12-15 VITALS
WEIGHT: 190.8 LBS | HEART RATE: 96 BPM | OXYGEN SATURATION: 96 % | RESPIRATION RATE: 18 BRPM | HEIGHT: 69 IN | BODY MASS INDEX: 28.26 KG/M2 | TEMPERATURE: 97.2 F | SYSTOLIC BLOOD PRESSURE: 137 MMHG | DIASTOLIC BLOOD PRESSURE: 99 MMHG

## 2021-12-15 DIAGNOSIS — Z96.651 STATUS POST RIGHT KNEE REPLACEMENT: Primary | ICD-10-CM

## 2021-12-15 PROCEDURE — 250N000013 HC RX MED GY IP 250 OP 250 PS 637: Performed by: NURSE ANESTHETIST, CERTIFIED REGISTERED

## 2021-12-15 PROCEDURE — 27447 TOTAL KNEE ARTHROPLASTY: CPT | Performed by: NURSE ANESTHETIST, CERTIFIED REGISTERED

## 2021-12-15 PROCEDURE — 710N000012 HC RECOVERY PHASE 2, PER MINUTE: Performed by: ORTHOPAEDIC SURGERY

## 2021-12-15 PROCEDURE — 370N000017 HC ANESTHESIA TECHNICAL FEE, PER MIN: Performed by: ORTHOPAEDIC SURGERY

## 2021-12-15 PROCEDURE — C1776 JOINT DEVICE (IMPLANTABLE): HCPCS | Performed by: ORTHOPAEDIC SURGERY

## 2021-12-15 PROCEDURE — 97530 THERAPEUTIC ACTIVITIES: CPT | Mod: GP | Performed by: PHYSICAL THERAPIST

## 2021-12-15 PROCEDURE — 76942 ECHO GUIDE FOR BIOPSY: CPT | Mod: 26 | Performed by: NURSE ANESTHETIST, CERTIFIED REGISTERED

## 2021-12-15 PROCEDURE — 250N000011 HC RX IP 250 OP 636: Performed by: ORTHOPAEDIC SURGERY

## 2021-12-15 PROCEDURE — 64447 NJX AA&/STRD FEMORAL NRV IMG: CPT | Mod: XU | Performed by: NURSE ANESTHETIST, CERTIFIED REGISTERED

## 2021-12-15 PROCEDURE — 258N000003 HC RX IP 258 OP 636

## 2021-12-15 PROCEDURE — 97116 GAIT TRAINING THERAPY: CPT | Mod: GP | Performed by: PHYSICAL THERAPIST

## 2021-12-15 PROCEDURE — 999N000141 HC STATISTIC PRE-PROCEDURE NURSING ASSESSMENT: Performed by: ORTHOPAEDIC SURGERY

## 2021-12-15 PROCEDURE — 250N000011 HC RX IP 250 OP 636: Performed by: NURSE ANESTHETIST, CERTIFIED REGISTERED

## 2021-12-15 PROCEDURE — 27447 TOTAL KNEE ARTHROPLASTY: CPT | Mod: RT | Performed by: ORTHOPAEDIC SURGERY

## 2021-12-15 PROCEDURE — 64445 NJX AA&/STRD SCIATIC NRV IMG: CPT | Mod: XU | Performed by: NURSE ANESTHETIST, CERTIFIED REGISTERED

## 2021-12-15 PROCEDURE — 258N000003 HC RX IP 258 OP 636: Performed by: NURSE ANESTHETIST, CERTIFIED REGISTERED

## 2021-12-15 PROCEDURE — 97110 THERAPEUTIC EXERCISES: CPT | Mod: GP | Performed by: PHYSICAL THERAPIST

## 2021-12-15 PROCEDURE — 999N000065 XR KNEE PORT RIGHT 1/2 VIEWS: Mod: RT

## 2021-12-15 PROCEDURE — 360N000078 HC SURGERY LEVEL 5, PER MIN: Performed by: ORTHOPAEDIC SURGERY

## 2021-12-15 PROCEDURE — 250N000009 HC RX 250

## 2021-12-15 PROCEDURE — 88300 SURGICAL PATH GROSS: CPT | Mod: TC | Performed by: ORTHOPAEDIC SURGERY

## 2021-12-15 PROCEDURE — 27447 TOTAL KNEE ARTHROPLASTY: CPT | Mod: AS | Performed by: PHYSICIAN ASSISTANT

## 2021-12-15 PROCEDURE — 272N000001 HC OR GENERAL SUPPLY STERILE: Performed by: ORTHOPAEDIC SURGERY

## 2021-12-15 PROCEDURE — 250N000011 HC RX IP 250 OP 636

## 2021-12-15 PROCEDURE — C9290 INJ, BUPIVACAINE LIPOSOME: HCPCS | Performed by: ORTHOPAEDIC SURGERY

## 2021-12-15 PROCEDURE — 710N000010 HC RECOVERY PHASE 1, LEVEL 2, PER MIN: Performed by: ORTHOPAEDIC SURGERY

## 2021-12-15 PROCEDURE — 97161 PT EVAL LOW COMPLEX 20 MIN: CPT | Mod: GP | Performed by: PHYSICAL THERAPIST

## 2021-12-15 PROCEDURE — 250N000009 HC RX 250: Performed by: ORTHOPAEDIC SURGERY

## 2021-12-15 DEVICE — IMPLANTABLE DEVICE: Type: IMPLANTABLE DEVICE | Site: KNEE | Status: FUNCTIONAL

## 2021-12-15 RX ORDER — NALOXONE HYDROCHLORIDE 0.4 MG/ML
0.2 INJECTION, SOLUTION INTRAMUSCULAR; INTRAVENOUS; SUBCUTANEOUS
Status: DISCONTINUED | OUTPATIENT
Start: 2021-12-15 | End: 2021-12-15 | Stop reason: HOSPADM

## 2021-12-15 RX ORDER — ONDANSETRON 4 MG/1
4 TABLET, ORALLY DISINTEGRATING ORAL EVERY 30 MIN PRN
Status: DISCONTINUED | OUTPATIENT
Start: 2021-12-15 | End: 2021-12-15 | Stop reason: HOSPADM

## 2021-12-15 RX ORDER — POLYETHYLENE GLYCOL 3350 17 G/17G
17 POWDER, FOR SOLUTION ORAL DAILY
Status: CANCELLED | OUTPATIENT
Start: 2021-12-16

## 2021-12-15 RX ORDER — HYDROXYZINE HYDROCHLORIDE 10 MG/1
10 TABLET, FILM COATED ORAL EVERY 6 HOURS PRN
Qty: 30 TABLET | Refills: 0 | Status: SHIPPED | OUTPATIENT
Start: 2021-12-15 | End: 2023-09-21

## 2021-12-15 RX ORDER — CEFAZOLIN SODIUM 2 G/100ML
2 INJECTION, SOLUTION INTRAVENOUS
Status: COMPLETED | OUTPATIENT
Start: 2021-12-15 | End: 2021-12-15

## 2021-12-15 RX ORDER — OXYCODONE HYDROCHLORIDE 5 MG/1
5 TABLET ORAL EVERY 4 HOURS PRN
Status: DISCONTINUED | OUTPATIENT
Start: 2021-12-15 | End: 2021-12-15 | Stop reason: HOSPADM

## 2021-12-15 RX ORDER — HYDROMORPHONE HYDROCHLORIDE 2 MG/1
2 TABLET ORAL EVERY 4 HOURS PRN
Status: CANCELLED | OUTPATIENT
Start: 2021-12-15

## 2021-12-15 RX ORDER — AMOXICILLIN 250 MG
1 CAPSULE ORAL 2 TIMES DAILY
Status: CANCELLED | OUTPATIENT
Start: 2021-12-15

## 2021-12-15 RX ORDER — ONDANSETRON 2 MG/ML
4 INJECTION INTRAMUSCULAR; INTRAVENOUS EVERY 6 HOURS PRN
Status: CANCELLED | OUTPATIENT
Start: 2021-12-15

## 2021-12-15 RX ORDER — IBUPROFEN 200 MG
600 TABLET ORAL EVERY 6 HOURS PRN
Status: CANCELLED | OUTPATIENT
Start: 2021-12-15

## 2021-12-15 RX ORDER — FENTANYL CITRATE 50 UG/ML
50 INJECTION, SOLUTION INTRAMUSCULAR; INTRAVENOUS EVERY 5 MIN PRN
Status: DISCONTINUED | OUTPATIENT
Start: 2021-12-15 | End: 2021-12-15 | Stop reason: HOSPADM

## 2021-12-15 RX ORDER — LIDOCAINE 40 MG/G
CREAM TOPICAL
Status: DISCONTINUED | OUTPATIENT
Start: 2021-12-15 | End: 2021-12-15 | Stop reason: HOSPADM

## 2021-12-15 RX ORDER — NALOXONE HYDROCHLORIDE 0.4 MG/ML
0.4 INJECTION, SOLUTION INTRAMUSCULAR; INTRAVENOUS; SUBCUTANEOUS
Status: DISCONTINUED | OUTPATIENT
Start: 2021-12-15 | End: 2021-12-15 | Stop reason: HOSPADM

## 2021-12-15 RX ORDER — PROPOFOL 10 MG/ML
INJECTION, EMULSION INTRAVENOUS PRN
Status: DISCONTINUED | OUTPATIENT
Start: 2021-12-15 | End: 2021-12-15

## 2021-12-15 RX ORDER — CEFAZOLIN SODIUM 2 G/100ML
2 INJECTION, SOLUTION INTRAVENOUS SEE ADMIN INSTRUCTIONS
Status: DISCONTINUED | OUTPATIENT
Start: 2021-12-15 | End: 2021-12-15 | Stop reason: HOSPADM

## 2021-12-15 RX ORDER — CEFAZOLIN SODIUM 2 G/100ML
2 INJECTION, SOLUTION INTRAVENOUS EVERY 8 HOURS
Status: CANCELLED | OUTPATIENT
Start: 2021-12-15 | End: 2021-12-16

## 2021-12-15 RX ORDER — BUPIVACAINE HYDROCHLORIDE 2.5 MG/ML
INJECTION, SOLUTION EPIDURAL; INFILTRATION; INTRACAUDAL
Status: COMPLETED | OUTPATIENT
Start: 2021-12-15 | End: 2021-12-15

## 2021-12-15 RX ORDER — PROCHLORPERAZINE MALEATE 5 MG
5 TABLET ORAL EVERY 6 HOURS PRN
Status: CANCELLED | OUTPATIENT
Start: 2021-12-15

## 2021-12-15 RX ORDER — HYDROMORPHONE HYDROCHLORIDE 4 MG/1
4 TABLET ORAL EVERY 4 HOURS PRN
Status: CANCELLED | OUTPATIENT
Start: 2021-12-15

## 2021-12-15 RX ORDER — ONDANSETRON 2 MG/ML
4 INJECTION INTRAMUSCULAR; INTRAVENOUS EVERY 30 MIN PRN
Status: DISCONTINUED | OUTPATIENT
Start: 2021-12-15 | End: 2021-12-15 | Stop reason: HOSPADM

## 2021-12-15 RX ORDER — TRANEXAMIC ACID 10 MG/ML
1 INJECTION, SOLUTION INTRAVENOUS ONCE
Status: COMPLETED | OUTPATIENT
Start: 2021-12-15 | End: 2021-12-15

## 2021-12-15 RX ORDER — BUPIVACAINE HYDROCHLORIDE 2.5 MG/ML
INJECTION, SOLUTION INFILTRATION; PERINEURAL PRN
Status: DISCONTINUED | OUTPATIENT
Start: 2021-12-15 | End: 2021-12-15 | Stop reason: HOSPADM

## 2021-12-15 RX ORDER — FENTANYL CITRATE 50 UG/ML
50 INJECTION, SOLUTION INTRAMUSCULAR; INTRAVENOUS
Status: DISCONTINUED | OUTPATIENT
Start: 2021-12-15 | End: 2021-12-15 | Stop reason: HOSPADM

## 2021-12-15 RX ORDER — PROPOFOL 10 MG/ML
INJECTION, EMULSION INTRAVENOUS CONTINUOUS PRN
Status: DISCONTINUED | OUTPATIENT
Start: 2021-12-15 | End: 2021-12-15

## 2021-12-15 RX ORDER — DEXAMETHASONE SODIUM PHOSPHATE 10 MG/ML
INJECTION, SOLUTION INTRAMUSCULAR; INTRAVENOUS PRN
Status: DISCONTINUED | OUTPATIENT
Start: 2021-12-15 | End: 2021-12-15

## 2021-12-15 RX ORDER — TRANEXAMIC ACID 10 MG/ML
1 INJECTION, SOLUTION INTRAVENOUS ONCE
Status: DISCONTINUED | OUTPATIENT
Start: 2021-12-15 | End: 2021-12-15 | Stop reason: HOSPADM

## 2021-12-15 RX ORDER — GLYCOPYRROLATE 0.2 MG/ML
INJECTION, SOLUTION INTRAMUSCULAR; INTRAVENOUS PRN
Status: DISCONTINUED | OUTPATIENT
Start: 2021-12-15 | End: 2021-12-15

## 2021-12-15 RX ORDER — ALBUTEROL SULFATE 0.83 MG/ML
2.5 SOLUTION RESPIRATORY (INHALATION) EVERY 4 HOURS PRN
Status: DISCONTINUED | OUTPATIENT
Start: 2021-12-15 | End: 2021-12-15 | Stop reason: HOSPADM

## 2021-12-15 RX ORDER — ACETAMINOPHEN 325 MG/1
650 TABLET ORAL EVERY 4 HOURS PRN
Qty: 100 TABLET | Refills: 0 | Status: SHIPPED | OUTPATIENT
Start: 2021-12-15 | End: 2023-09-21

## 2021-12-15 RX ORDER — HYDROMORPHONE HYDROCHLORIDE 1 MG/ML
0.4 INJECTION, SOLUTION INTRAMUSCULAR; INTRAVENOUS; SUBCUTANEOUS
Status: CANCELLED | OUTPATIENT
Start: 2021-12-15

## 2021-12-15 RX ORDER — HYDROMORPHONE HYDROCHLORIDE 1 MG/ML
0.4 INJECTION, SOLUTION INTRAMUSCULAR; INTRAVENOUS; SUBCUTANEOUS EVERY 5 MIN PRN
Status: DISCONTINUED | OUTPATIENT
Start: 2021-12-15 | End: 2021-12-15 | Stop reason: HOSPADM

## 2021-12-15 RX ORDER — CEFAZOLIN SODIUM 2 G/100ML
2 INJECTION, SOLUTION INTRAVENOUS ONCE
Status: COMPLETED | OUTPATIENT
Start: 2021-12-15 | End: 2021-12-15

## 2021-12-15 RX ORDER — SODIUM CHLORIDE, SODIUM LACTATE, POTASSIUM CHLORIDE, CALCIUM CHLORIDE 600; 310; 30; 20 MG/100ML; MG/100ML; MG/100ML; MG/100ML
INJECTION, SOLUTION INTRAVENOUS CONTINUOUS
Status: DISCONTINUED | OUTPATIENT
Start: 2021-12-15 | End: 2021-12-15 | Stop reason: HOSPADM

## 2021-12-15 RX ORDER — ONDANSETRON 4 MG/1
4 TABLET, ORALLY DISINTEGRATING ORAL EVERY 6 HOURS PRN
Status: CANCELLED | OUTPATIENT
Start: 2021-12-15

## 2021-12-15 RX ORDER — CHLOROPROCAINE HYDROCHLORIDE 20 MG/ML
INJECTION, SOLUTION EPIDURAL; INFILTRATION; INTRACAUDAL; PERINEURAL
Status: COMPLETED | OUTPATIENT
Start: 2021-12-15 | End: 2021-12-15

## 2021-12-15 RX ORDER — SODIUM CHLORIDE, SODIUM LACTATE, POTASSIUM CHLORIDE, CALCIUM CHLORIDE 600; 310; 30; 20 MG/100ML; MG/100ML; MG/100ML; MG/100ML
INJECTION, SOLUTION INTRAVENOUS CONTINUOUS
Status: CANCELLED | OUTPATIENT
Start: 2021-12-15

## 2021-12-15 RX ORDER — ASPIRIN 325 MG
325 TABLET, DELAYED RELEASE (ENTERIC COATED) ORAL DAILY
Status: CANCELLED | OUTPATIENT
Start: 2021-12-15

## 2021-12-15 RX ORDER — METOPROLOL TARTRATE 1 MG/ML
1-2 INJECTION, SOLUTION INTRAVENOUS EVERY 5 MIN PRN
Status: DISCONTINUED | OUTPATIENT
Start: 2021-12-15 | End: 2021-12-15 | Stop reason: HOSPADM

## 2021-12-15 RX ORDER — HYDROMORPHONE HYDROCHLORIDE 1 MG/ML
0.2 INJECTION, SOLUTION INTRAMUSCULAR; INTRAVENOUS; SUBCUTANEOUS
Status: CANCELLED | OUTPATIENT
Start: 2021-12-15

## 2021-12-15 RX ORDER — LIDOCAINE 40 MG/G
CREAM TOPICAL
Status: CANCELLED | OUTPATIENT
Start: 2021-12-15

## 2021-12-15 RX ORDER — BISACODYL 10 MG
10 SUPPOSITORY, RECTAL RECTAL DAILY PRN
Status: CANCELLED | OUTPATIENT
Start: 2021-12-15

## 2021-12-15 RX ORDER — ONDANSETRON 2 MG/ML
INJECTION INTRAMUSCULAR; INTRAVENOUS PRN
Status: DISCONTINUED | OUTPATIENT
Start: 2021-12-15 | End: 2021-12-15

## 2021-12-15 RX ADMIN — SODIUM CHLORIDE, POTASSIUM CHLORIDE, SODIUM LACTATE AND CALCIUM CHLORIDE: 600; 310; 30; 20 INJECTION, SOLUTION INTRAVENOUS at 07:40

## 2021-12-15 RX ADMIN — MIDAZOLAM 2 MG: 1 INJECTION INTRAMUSCULAR; INTRAVENOUS at 07:25

## 2021-12-15 RX ADMIN — PROPOFOL 20 MG: 10 INJECTION, EMULSION INTRAVENOUS at 07:46

## 2021-12-15 RX ADMIN — ONDANSETRON 4 MG: 2 INJECTION INTRAMUSCULAR; INTRAVENOUS at 07:53

## 2021-12-15 RX ADMIN — PROPOFOL 10 MG: 10 INJECTION, EMULSION INTRAVENOUS at 08:04

## 2021-12-15 RX ADMIN — BUPIVACAINE HYDROCHLORIDE 15 ML: 2.5 INJECTION, SOLUTION EPIDURAL; INFILTRATION; INTRACAUDAL at 07:34

## 2021-12-15 RX ADMIN — BUPIVACAINE HYDROCHLORIDE 20 ML: 2.5 INJECTION, SOLUTION EPIDURAL; INFILTRATION; INTRACAUDAL at 07:23

## 2021-12-15 RX ADMIN — CEFAZOLIN SODIUM 2 G: 2 INJECTION, SOLUTION INTRAVENOUS at 11:30

## 2021-12-15 RX ADMIN — GLYCOPYRROLATE 0.2 MG: 0.2 INJECTION, SOLUTION INTRAMUSCULAR; INTRAVENOUS at 08:14

## 2021-12-15 RX ADMIN — DEXAMETHASONE SODIUM PHOSPHATE 10 MG: 10 INJECTION, SOLUTION INTRAMUSCULAR; INTRAVENOUS at 08:32

## 2021-12-15 RX ADMIN — BUPIVACAINE HYDROCHLORIDE 20 ML: 2.5 INJECTION, SOLUTION EPIDURAL; INFILTRATION; INTRACAUDAL at 07:26

## 2021-12-15 RX ADMIN — PROPOFOL 20 MG: 10 INJECTION, EMULSION INTRAVENOUS at 07:57

## 2021-12-15 RX ADMIN — FENTANYL CITRATE 50 MCG: 50 INJECTION INTRAMUSCULAR; INTRAVENOUS at 12:25

## 2021-12-15 RX ADMIN — CEFAZOLIN SODIUM 2 G: 2 INJECTION, SOLUTION INTRAVENOUS at 07:38

## 2021-12-15 RX ADMIN — PROPOFOL 85 MCG/KG/MIN: 10 INJECTION, EMULSION INTRAVENOUS at 07:52

## 2021-12-15 RX ADMIN — SODIUM CHLORIDE, POTASSIUM CHLORIDE, SODIUM LACTATE AND CALCIUM CHLORIDE: 600; 310; 30; 20 INJECTION, SOLUTION INTRAVENOUS at 08:46

## 2021-12-15 RX ADMIN — CHLOROPROCAINE HYDROCHLORIDE 60 MG: 20 INJECTION, SOLUTION EPIDURAL; INFILTRATION; INTRACAUDAL; PERINEURAL at 07:55

## 2021-12-15 RX ADMIN — FENTANYL CITRATE 50 MCG: 50 INJECTION INTRAMUSCULAR; INTRAVENOUS at 11:40

## 2021-12-15 RX ADMIN — TRANEXAMIC ACID 1 G: 10 INJECTION, SOLUTION INTRAVENOUS at 08:00

## 2021-12-15 RX ADMIN — PROPOFOL 40 MG: 10 INJECTION, EMULSION INTRAVENOUS at 09:18

## 2021-12-15 RX ADMIN — OXYCODONE HYDROCHLORIDE 5 MG: 5 TABLET ORAL at 10:39

## 2021-12-15 RX ADMIN — TRANEXAMIC ACID 1 G: 1 INJECTION, SOLUTION INTRAVENOUS at 09:09

## 2021-12-15 ASSESSMENT — LIFESTYLE VARIABLES: TOBACCO_USE: 1

## 2021-12-15 ASSESSMENT — MIFFLIN-ST. JEOR: SCORE: 1607.9

## 2021-12-15 NOTE — ANESTHESIA PROCEDURE NOTES
Other (IPECK) Procedure Note    Pre-Procedure   Staff -        Resident/Fellow: Key Garcia       CRNA: Aries Lancaster APRN CRNA       Performed By: ROGELIO and CRNA       Location: OR       Procedure Start/Stop Times: 12/15/2021 7:23 AM and 12/15/2021 7:26 AM       Pre-Anesthestic Checklist: patient identified, IV checked, site marked, risks and benefits discussed, informed consent, monitors and equipment checked, pre-op evaluation, at physician/surgeon's request and post-op pain management  Timeout:       Correct Patient: Yes        Correct Procedure: Yes        Correct Site: Yes        Correct Position: Yes        Correct Laterality: Yes        Site Marked: Yes  Procedure Documentation  Procedure: Other (IPECK)       Diagnosis: R TKA       Laterality: right       Patient Position: supine       Skin prep: Chloraprep       Needle Type: insulated       Needle Gauge: 20.        Needle Length (Inches): 4        Ultrasound guided       1. Ultrasound was used to identify targeted nerve, plexus, vascular marker, or fascial plane and place a needle adjacent to it in real-time.       2. Ultrasound was used to visualize the spread of anesthetic in close proximity to the above referenced structure.       3. A permanent image is entered into the patient's record.       4. The visualized anatomic structures appeared normal.       5. There were no apparent abnormal pathologic findings.    Assessment/Narrative         The placement was negative for: blood aspirated, painful injection and site bleeding       Paresthesias: No.     Bolus given via needle..        Secured via.        Insertion/Infusion Method: Single Shot       Complications: none       Injection made incrementally with aspirations every 5 mL.    Medication(s) Administered   Bupivacaine 0.25% PF (Infiltration), 20 mL  Medication Administration Time: 12/15/2021 7:26 AM

## 2021-12-15 NOTE — PROGRESS NOTES
Inpatient Physical Therapy Evaluation    Name: Christopher Valentine MRN# [id number]   Age: 71 year old YOB: 1950     Date of Consultation: December 15, 2021  Consultation is requested by:  Dr. Vasquez  Specific Consultation Request:  S/p R TKA  Primary care provider: Di Orr    General Information:   Onset Date: 12/15/2021    History of Current Problem/Admission: Right knee pain [M25.561]    Prior Level of Function: Pt reports he is independent with all ADLs prior to surgery.  Pt ambulates without assistive device.  Pt drives.    Ambulation: 0 - Independent   Transferrin - Independent   Toiletin - Independent    Bathin - Independent   Dressin - Independent   Eatin - Independent   Communication: 0 - Understands/communicates without difficulty  Swallowin - swallows foods/liquids without difficulty  Cognition: 0 - no cognitive issues reported    Fall history within the last 6 months: Yes, reports he slipped on ice a week or so ago    Current Living Situation: Patient lives with his wife.  Pt has 3 steps to enter home with single rail on right.  Pt has basement where is wood stove is but reports wife can manage that until he can manage stairs.  Pt's bedroom and shower are located on the [facility].    Current Equipment Used at Home: none but has a FWW at home     Patient & Family Goals: to go home     Past Medical History:   Past Medical History:   Diagnosis Date     Alcohol Abuse 2011     Dyslipidemia 2011       Past Surgical History:  Past Surgical History:   Procedure Laterality Date     ANKLE SURGERY      LT, hardware removal and ankle fusion; traumatic arthritis     COLONOSCOPY      with polypectomy     COLONOSCOPY N/A 4/15/2021    Procedure: Colonoscopy, with  polypectomy;  Surgeon: Sam Smith MD;  Location: [facility] OR       Medications:   Current Facility-Administered Medications   Medication     fentaNYL (PF) (SUBLIMAZE) injection 50 mcg      lactated ringers infusion     naloxone (NARCAN) injection 0.2 mg    Or     naloxone (NARCAN) injection 0.4 mg    Or     naloxone (NARCAN) injection 0.2 mg    Or     naloxone (NARCAN) injection 0.4 mg     ondansetron (ZOFRAN-ODT) ODT tab 4 mg    Or     ondansetron (ZOFRAN) injection 4 mg     oxyCODONE (ROXICODONE) tablet 5 mg       Weight Bearing Status: WBAT R LE     Cognitive Status Examination:  Orientation: oriented to time, place and person  Level of Consciousness: alert  Follows Commands and Answers Questions: 100% of the time  Personal Safety and Judgement: Intact  Memory: some minor memory deficits noted, does not recall when appointments are set up for exactly.  Comments:     Pain:   Currently in pain? Yes  Pain Location? right knee  Pain Ratin    Physical Therapy Evaluation:   Integumentary/Edema: right knee incision covered with ace wrap  ROM: R knee grossly 8-85 degrees actively, L knee AROM WFL  Strength: able to complete SLR on R LE, remaining LE strength appears WNL  Bed Mobility: independent  Transfers: sit>stand SBA   Gait: ambulated across patient room with FWW SBA, step-to gait progressing to step-through  Balance: good with support from walker  Sensory: denies numbness/tingling LEs  Coordination: NT    Physical Therapy Interventions: Gait Training , ROM, Strengthening, Transfer Training, Risk Factor Education and Home Programming Guidelines    Clinical Impressions:  Criteria for Skilled Therapeutic Intervention Met: Yes, treatment indicated  PT Diagnosis: gait disturbance s/p R TKA  Influenced by the following impairments: pain, decreased ROM, decreased strength  Functional limitations due to impairment: decreased tolerance for functional mobility and ADLs  Clinical presentation: Stable/Uncomplicated  Clinical presentation rationale: clinical judgement  Clinical Decision making (complexity): Low Complexity  Frequency: eval + 1 treatment  Predicted Duration of Therapy Intervention (days/wks): 1  day  Anticipated Discharge Disposition: Home with Outpatient Therapy   Anticipated Equipment Needs at Discharge:   Risks and Benefits of therapy have been explained: Yes  Patient, Family & other staff in agreement with plan of care: Yes  Clinical Impression Comments: PT evaluation completed.  Pt was independent with ADLs and ambulating without assistive device prior to surgery.  Pt currently demonstrates mild limitations in functional mobility due to pain and ROM limitations.  Pt is safe to discharge to home with assistance as needed from wife and outpatient PT.  Wife was not present for teaching so unsure how much pt will retain from education provided today.      Total Eval Time: 10

## 2021-12-15 NOTE — ANESTHESIA POSTPROCEDURE EVALUATION
Patient: Christopher Valentine    Procedure: Procedure(s):  RIGHT TOTAL KNEE ARTHROPLASTY       Diagnosis:Right knee pain [M25.561]  Diagnosis Additional Information: No value filed.    Anesthesia Type:  Spinal    Note:  Disposition: Outpatient   Postop Pain Control: Uneventful            Sign Out: Well controlled pain   PONV: No   Neuro/Psych: Uneventful            Sign Out: Acceptable/Baseline neuro status   Airway/Respiratory: Uneventful            Sign Out: Acceptable/Baseline resp. status   CV/Hemodynamics: Uneventful            Sign Out: Acceptable CV status; No obvious hypovolemia; No obvious fluid overload   Other NRE: NONE   DID A NON-ROUTINE EVENT OCCUR? No           Last vitals:  Vitals Value Taken Time   /83 12/15/21 1000   Temp 97  F (36.1  C) 12/15/21 1000   Pulse 84 12/15/21 1000   Resp 14 12/15/21 1000   SpO2 97 % 12/15/21 1000       Electronically Signed By: JACINTA Elizabeth CRNA  December 15, 2021  2:15 PM

## 2021-12-15 NOTE — LETTER
Christopher Valentine  [address]  [place] [address]      SURGERY PACKET            Your surgery is scheduled:    Date: 12/15/21    Time: We will call you 12/14 with your admit time      Surgeon's Name: Dr. Vasquez      Pre-Op Physical Fax Numbers:          Pre-Admissions  [phone]        Your surgery is located at:  [address] [address]         Before Your Surgery  For Patients and Visitors at [facility]    Welcome  As you get ready for surgery, you may have a lot of questions.  This brochure will help you know what to expect before and after surgery.  You and your family are the most important members of your health care team.  You will need to take an active role in your care.    Be sure to ask questions and learn all that you can about your surgery.  If you have any safety concerns, we urge you to tell a nurse as soon as possible.   This brochure is for information only.  It does not replace the advice of your doctor.  Always follow your doctor's advice.    Please tell us if you need a .    GETTING READY FOR SURGERY  Always follow your surgeon's instructions.  If you don't, your surgery could be canceled.  Please use the following checklist.    Within 30 Days of Surgery:    Have a pre-surgery physical exam with your family doctor or partner.    If you use a [facility], all of your information from the pre-op physical will be in the [employer] computer system.    Ask the doctor to send all of your results to the hospital before the surgery.  The doctor also may ask you to bring the results with you on the day of surgery or you can fax them to [phone].  Tell the doctor if:    You are allergic to latex or rubber  (Latex and rubber gloves are often used in medical care).    You are taking any medicines (including aspirin), vitamins (Vitamin E, Fish Oil, Omegas) or herbal products.  You will need to stop taking some  medicines before surgery.    You have any medical problems (allergies, diabetes or heart disease, for example).    You have a pacemaker or an AICD (automatic implanted cardiac defibrillator).  If you do, please bring the ID card with you on the day of surgery.    You are a smoker.  People who smoke have a higher risk of infection after surgery.  Ask your doctor how you can quit smoking.  Within 7 days of Surgery:  Prior to your surgical procedure, a nurse will be contacting you to obtain a health history. A nurse will call you within a few days of surgery to go over these and other instructions.  If you do not hear from them, please call them at [phone].        Call your insurance company.  Ask if you need pre-approval for your surgery.  If you do not have insurance, please let us know.      Arrange for someone to drive you home after surgery.  If you will have same-day surgery, you may not drive or take public transportation home by yourself.    Arrange for someone to stay with you for 24 hours after you go home.  This person must be a responsible adult (ie- Family member or friend).  The Day Before Surgery:     Call your surgeon if there are any changes in your health.  This includes signs of a cold or flu (such as a sore throat, runny nose, cough, rash or fever).    Do not smoke, drink alcohol or take over-the-counter medicine (unless your surgeon tells you to) for 24 hours before and after surgery.    If you take prescribed drugs:  You may need to stop them until after the surgery.  Follow your doctor's orders.  You may resume Aspirin and/or blood thinners after your surgery as directed by your physician/surgeon.    NO FOOD OR DRINK AFTER MIDNIGHT.  Follow your surgeon's orders for eating and drinking.  You need to have an empty stomach before surgery.  This will make the surgery as safe as possible.  If you don't follow your doctor's orders, your surgery could be changed to another date.    The Day of  Surgery:    Take a shower or bath the night before and the morning of surgery.  Use antiseptic soap or the soap your surgeon gave you.  Gently clean the skin.  Do not shave or scrub your surgery site.    Please remove deodorant, cologne, scented lotion, makeup, nail polish and jewelry (including rings and body piercings).  If you wear artificial nails, please remove at least one nail before coming to the hospital.    Wear clean, loose clothing to the hospital.    Bring these items to the hospital:  1. Your insurance card.  2. A list of all the medicines you take.  Include vitamins, minerals, herbs and over-the-counter drugs.  Note any drug allergies.  3. A copy of your advance health care directive, if you have one.  This tells us what treatment you would want -- and who would make health care decisions -- if you could no longer speak for yourself.  You may request this form in advance or download it from [email or web address].  4. A case for any glasses, contact lenses, hearing aids or dentures.  5. Your inhaler or CPAP machine, if you use these at home.  Leave extra cash, jewelry and other valuables at home.    When You Arrive:  When you get to the hospital, you will:    Check in.  If you are under age 18, you must be with a parent or legal guardian.    Electronically sign consent forms, if you haven't already.  These electronic forms state that you know the risks and benefits of surgery.  When you sign the forms, you give us permission to do the surgery.  Do not sign them unless you understand what will happen during and after your surgery.  If you have any questions about your surgery, ask to speak with your doctor before you sign the forms.  If you don't understand the answers, ask again.    Receive a copy of the Patients Bill of Rights.  If you do not receive a copy, please ask for one.    Change into hospital clothes.  Your belongings will be placed in a bag.  We will return them to you after  "surgery.    Meet with the anesthesia provider.  He or she will tell you what kind of anesthesia (medicine) will be used to keep you comfortable during surgery.  Remember: It's okay to remind doctors and nurses to wash their hands before touching you.   In most cases, your surgeon will use a marker to write his or her initials on the surgery site.  This ensures that the exact site is operated on.  For safety reasons, we will ask you the same questions many times.  For example, we may ask your name and birth date over and over again.  Friends and family can stay with you until it's time for surgery.  While you're in surgery, they will be in the waiting area.  Please note that cell phones are not allowed in some patient care areas.  If you have questions about what will happen in the operating room, talk to your care team.  After Surgery:    We will move you to a recovery room where we will watch you closely.  If you have any pain or discomfort, tell your nurse.  He or she will try to make you comfortable.      If you are staying overnight we will move you to your hospital room after you are awake.    If you are going home we will move you to another room.  Friends and family may be able to join you.  The length of time you spend in recovery depends on the type of medicine you received, your medical condition, and the type of surgery you had.  Dealing with pain:  A nurse will check your comfort level often during your stay.  He or she will work with you to manage your pain.  Remember:    All pain is real.  There are many ways to control pain.  We can help you decide what works best for you.    Ask for pain medicine when you need it.  Don't try to \"tough it out\" -- this can make you feel worse.  Always take your medicine as ordered.    Medicine doesn't work the same for everyone.  If your medicine isn't working tell your nurse.  There may be other medicines or treatments we can try.  Going Home:  We will let you know " when you're ready to leave the hospital.  Before you leave, we will tell you how to care for yourself at home and prevent infections.  If you do not understand something, please say so.  We will answer any questions you have.  We will then help you get ready to leave.  You must have an adult with you for the first 24 hours after you leave the hospital. Take it easy when you get home.  You will need some time to recover -- you may be more tired than you realize at first.  Rest and relax for at least the first 24 hours at home.  You'll feel better and heal faster if you take good care of yourself.                      Pre-Operative Surgery Scrub    Purpose:   The skin harbors a large variety of bacteria, both infectious and noninfectious.  Showering with an antiseptic soap prior to an invasive procedure will decrease the number of transient and resident (good and bad) bacteria that is normally found on the skin.    Procedure:      Shower with 1 bottle of antiseptic soap (enclosed) the evening before and 1 bottle the morning of surgery (bathing the day of the procedure is most important).       Apply the soap at full strength (use the entire bottle).  Gently clean the skin, rinse, and dry with a clean towel that is freshly laundered (out of the dryer) and then put on clean clothing that is freshly laundered.        We have given you information regarding pre-op showering.  We recommend that patients wash with an antiseptic soap prior to surgery.  This cleanser will be given to you at the clinic or mailed to your home.  It is advised that you liberally wash the specific area surgery area the night before, and again in the morning before the surgery.  Do not apply lotion afterward.  We would like to keep the skin as clean as possible.    Thank you for following these important instructions.      You have been scheduled for surgery and we would like to give you some information that will assist in helping get the best  possible outcome.      Before Surgery:   If for any reason you decide not to have the surgery, please contact our office.  We can easily cancel or reschedule the procedure. Please call the  at [phone] or after hours [phone]      Any pain related to the surgery that occurs before the surgery needs to be reported and managed by your primary care or referring doctor.      Please keep in mind that the time of surgery is subject to change.  Make sure you have nothing to eat or drink after midnight.  If your surgery is later in the afternoon, this recommendation might change, but not until the day before surgery after the actual time of the surgery has been established.    After Surgery:  When you are discharged from the recovery room, the nurses will review instructions with you and your caregiver.      Please wash your hands every time you touch the wound or change bandages or dressings.      Do not submerge the wound in water.  You may not use a bathtub or hot tub until the wound is closed.  The wait time frame is generally 2-3 weeks but any open area can be a source of incoming bacteria, so it is better to be on the safe side and avoid the tub until your wound is fully healed.      You may take a shower 24 hours after surgery.  Double check with your surgeon if it is ok for water to run over a wound, whether it has been sutured, stapled, glued or is open.  You may gently wash the wound using the antiseptic soap provided for your pre-surgery showering (do not use a washcloth).  Any mild soap will work as well.      Many surgical wounds will have small white strips of tape on them called Steri Strips.  Do not remove these.  The edges will curl and fall off within 7-10 days with normal showering.      If you are going home with sutures (stitches) or staples, you must return to the clinic to have them taken out, usually within 1-2 weeks.      Signs and symptoms of infection include:  1. Fever,  temperature over 101.5 ' F  2. Redness  3. Swelling  4. Increasing pain  5. Green or yellow drainage which may or may not have a foul odor.    Symptoms of infection need to be reported to your surgery office. Please call your Surgeon.      If you or your  are deaf or hard of hearing, or prefer a language other than English, please let us know.  We have many free services, including interpreters and other aids to help you communicate. You may ask for help  through any member of your care team or by calling Language Services at [phone], option 2.

## 2021-12-15 NOTE — PROGRESS NOTES
12/15/21 1095   Signing Clinician's Name / Credentials   Signing clinician's name / credentials Susana Chauhan, DPT   Quick Adds   Rehab Discipline PT   Stair, Performance   Minutes of Treatment 8   Symptoms Noted During/After Treatment none   Treatment Detail Pt verbally instructed in proper stair management.  Pt then managed 3 steps with bilateral rails CGA with step-to pattern.  Then pt trialed 3 steps with single rail on right and instruction to use bilateral UEs on rail and completed with SBA.  Discussed not managing stairs to basement until cleared by outpatient PT.   Therapeutic Activity   Minutes of Treatment 20 minutes   Symptoms Noted During/After Treatment Increased pain   Treatment Detail Pt was able to don briefs and shorts with SBA for IV tubing.  Assist to don shirt due to IV placement.  Pt transferred sit>stand SBA from chair.  Pt ambulated 75'x2 with FWW CGA-SBA, improved gait speed with practice noted.  Educated pt on use of iceman cooler and importance of having barrier between skin and cuff.  Pt completed sup<>sit transfer independently with good control of R LE.     Therapeutic Exercise   Minutes of Treatment 15   Symptoms Noted During/After Treatment increased pain   Treatment Detail Pt instructed in and completed TKA therex following [facility] handbook: ankle pumps x10, quad sets with tactile cues, heel slides, SLR, SAQ, prolonged knee extension, seated LAQ, seated knee flexion.  Educated pt on importance of compliance with HEP 2x/day until he starts outpatient PT.  Book marked for HEP.   PT Discharge Planning    PT Discharge Recommendation (DC Rec) home with outpatient physical therapy   PT Rationale for DC Rec PT evaluation completed.  Pt was independent with ADLs and ambulating without assistive device prior to surgery.  Pt currently demonstrates mild limitations in functional mobility due to pain and ROM limitations.  Pt is safe to discharge to home with assistance as needed from wife and  outpatient PT.  Wife was not present for teaching so unsure how much pt will retain from education provided today.     PT Brief overview of current status  Pt verbally instructed in proper stair management.  Pt then managed 3 steps with bilateral rails CGA with step-to pattern.  Then pt trialed 3 steps with single rail on right and instruction to use bilateral UEs on rail and completed with SBA.  Discussed not managing stairs to basement until cleared by outpatient PT.  Pt was able to don briefs and shorts with SBA for IV tubing.  Assist to don shirt due to IV placement.  Pt transferred sit>stand SBA from chair.  Pt ambulated 75'x2 with FWW CGA-SBA, improved gait speed with practice noted.  Educated pt on use of iceman cooler and importance of having barrier between skin and cuff.  Pt completed sup<>sit transfer independently with good control of R LE.  Pt instructed in and completed TKA therex following [facility] handbook: ankle pumps x10, quad sets with tactile cues, heel slides, SLR, SAQ, prolonged knee extension, seated LAQ, seated knee flexion.  Educated pt on importance of compliance with HEP 2x/day until he starts outpatient PT.  Book marked for HEP.   Additional Documentation   Rehab Comments all education provided and pt mobilizing well   PT Plan Pt set to DC home this PM.   Total Session Time   Total Session Time (minutes) 53 minutes  (10 eval, 43 treatment)

## 2021-12-15 NOTE — ANESTHESIA CARE TRANSFER NOTE
Patient: Christopher Valentine    Procedure: Procedure(s):  RIGHT TOTAL KNEE ARTHROPLASTY       Diagnosis: Right knee pain [M25.561]  Diagnosis Additional Information: No value filed.    Anesthesia Type:   Spinal     Note:    Oropharynx: oropharynx clear of all foreign objects  Level of Consciousness: awake  Oxygen Supplementation: nasal cannula  Level of Supplemental Oxygen (L/min / FiO2): 2  Independent Airway: airway patency satisfactory and stable  Dentition: dentition unchanged  Vital Signs Stable: post-procedure vital signs reviewed and stable  Report to RN Given: handoff report given  Patient transferred to: PACU    Handoff Report: Identifed the Patient, Identified the Reponsible Provider, Reviewed the pertinent medical history, Discussed the surgical course, Reviewed Intra-OP anesthesia mangement and issues during anesthesia, Set expectations for post-procedure period and Allowed opportunity for questions and acknowledgement of understanding      Vitals:  Vitals Value Taken Time   BP     Temp     Pulse     Resp     SpO2         Electronically Signed By: Key Garcia  December 15, 2021  9:29 AM

## 2021-12-15 NOTE — ANESTHESIA PROCEDURE NOTES
Adductor canal Procedure Note    Pre-Procedure   Staff -        CRNA: Aries Lancaster APRN CRNA       Other Anesthesia Staff: Key Garcia       Performed By: ROGELIO and CRNA       Location: pre-op       Procedure Start/Stop Times: 12/15/2021 7:20 AM and 12/15/2021 7:23 AM       Pre-Anesthestic Checklist: patient identified, IV checked, site marked, risks and benefits discussed, informed consent, monitors and equipment checked, pre-op evaluation, at physician/surgeon's request and post-op pain management  Timeout:       Correct Patient: Yes        Correct Procedure: Yes        Correct Site: Yes        Correct Position: Yes        Correct Laterality: Yes        Site Marked: Yes  Procedure Documentation  Procedure: Adductor canal       Diagnosis: R TKA       Laterality: right       Patient Position: supine       Skin prep: Chloraprep       Needle Type: insulated       Needle Gauge: 20.        Needle Length (Inches): 4        Ultrasound guided       1. Ultrasound was used to identify targeted nerve, plexus, vascular marker, or fascial plane and place a needle adjacent to it in real-time.       2. Ultrasound was used to visualize the spread of anesthetic in close proximity to the above referenced structure.       3. A permanent image is entered into the patient's record.       4. The visualized anatomic structures appeared normal.       5. There were no apparent abnormal pathologic findings.    Assessment/Narrative         The placement was negative for: blood aspirated, painful injection and site bleeding       Paresthesias: No.     Bolus given via needle..        Secured via.        Insertion/Infusion Method: Single Shot       Complications: none       Injection made incrementally with aspirations every 5 mL.    Medication(s) Administered   Bupivacaine 0.25% PF (Infiltration), 20 mL  Medication Administration Time: 12/15/2021 7:23 AM

## 2021-12-15 NOTE — OP NOTE
Procedure Date: 12/15/2021    PREOPERATIVE DIAGNOSIS:  Right knee osteoarthritis.    POSTOPERATIVE DIAGNOSIS:  Right knee osteoarthritis.    PROCEDURE PERFORMED:  Right total knee arthroplasty.    SURGEON:  Cruz Vasquez MD    ASSISTANT:  Josemanuel Samayoa PA-C    ANESTHESIA:  Spinal with MAC.    INDICATIONS FOR PROCEDURE:  Christopher Valentine is a 71-year-old gentleman with severe end-stage right knee osteoarthritis.  He can no longer tolerate his knee in this fashion and wished to have his right knee replaced.  All of the risks and benefits of surgical intervention were discussed with him and given that he had exhausted all conservative measures for treatment, he wished to proceed.    DESCRIPTION OF PROCEDURE:  The patient was taken to the operating room.  After adequate induction of anesthesia, he was positioned, prepped and draped in usual sterile fashion on the operative table.  A universal protocol timeout was initiated.  Once all in the room were in agreement, the knee was exsanguinated and the tourniquet raised to 300 mmHg.  A midline incision was made over the right knee and this was carried down through the subcutaneous tissues and down through the bursa.  The extensor mechanism was then encountered.  Flaps were raised medial and lateral.  The median parapatellar incision was then performed and the patella was slid laterally.  We then gained access to the femoral intramedullary canal utilizing a drill and placed the intramedullary guide.  We took 1 cm of bone off of the distal most femur in 5 degrees of valgus.  Once this was accomplished, we applied a size 6 cutting block and utilizing the aaron wing ensured that we would not notch.  We then secured the cutting block and made our anterior, posterior and chamfer cuts.  The cutting block was removed.  We then applied the tibial cutting jig to the outside of the tibia.  Aligned this correctly taking 4 mm off of the medial tibial plateau.  Once the tibial cut had  been made, the osteotomy was removed, meniscectomies were performed and the tibia was sized to a size 6 tibial component.  We then trialed a size 6 femoral component, a size 6 tibial component with a 9 mm polyethylene liner.  This was slightly loose and so we decided to upsize to a size 10; however, it was stable.  We then everted the patella, it measured 25 mm and so we cut the patella freehand.  We applied a size 40 asymmetric cutting jig and drilled the patella.  We then thoroughly irrigated the knee, placed our Exparel, performed any secondary excisions of soft tissues from the knee that would be in the way.  We then brought the knee back into flexion and implanted our tibial component.  We then brought the knee into about 90 degrees of flexion, applied the femoral component and impacted this into place.  Both of these were press-fit components.  We then impacted a 10 mm polyethylene liner.  The tension was perfect.  We then press-fit our asymmetric 40 mm patellar component utilizing the press.  Once this was accomplished, the wound was copiously irrigated one final time.  The extensor mechanism was closed with #1 Vicryl in a figure-of-eight fashion.  Deep subcutaneous fat was reapproximated with #1 Vicryl as well.  The subcutaneous skin was closed with 2-0 Vicryl, staples were used on the skin and an Aquacel dressing was applied.  The leg was wrapped and the tourniquet let down.  He tolerated the procedure well.  All counts were correct at the end of the procedure and he was taken in stable condition to postanesthesia care unit.  He will be discharged today after a second dose of postoperative antibiotics and will follow up in 2 weeks.  He has all of his home medications already to include pain medication, antinausea medication, anti-inflammatories and DVT prophylaxis, which will be aspirin.    Cruz Vasquez MD        D: 12/15/2021   T: 12/15/2021   MT: [id number]    Name:     JOE CISNEROS  MRN:       [id number]        Account:        [id number]   :      1950           Procedure Date: 12/15/2021     Document: [id number]

## 2021-12-15 NOTE — OR NURSING
Patient and responsible adult given discharge instructions with no questions regarding instructions. Felicitas score 19/20. Pain level 4/10. RLE dressing CDI with ace wrap remaining, cryo cuff sent home with Pt.  Discharged from unit via wheel chair. Patient discharged to home with wife by brother Pradip as .

## 2021-12-15 NOTE — ANESTHESIA PROCEDURE NOTES
Other (Genicular x 3) Procedure Note    Pre-Procedure   Staff -        CRNA: Aries Lancaster APRN CRNA       Other Anesthesia Staff: Key Garcia       Performed By: CRNA and ROGELIO       Location: pre-op       Procedure Start/Stop Times: 12/15/2021 7:26 AM and 12/15/2021 7:34 AM       Pre-Anesthestic Checklist: patient identified, IV checked, site marked, risks and benefits discussed, informed consent, monitors and equipment checked, pre-op evaluation, at physician/surgeon's request and post-op pain management  Timeout:       Correct Patient: Yes        Correct Procedure: Yes        Correct Site: Yes        Correct Position: Yes        Correct Laterality: Yes        Site Marked: Yes  Procedure Documentation  Procedure: Other (Genicular x 3)       Diagnosis: R TKA       Laterality: right       Patient Position: supine       Skin prep: Chloraprep       Needle Type: insulated       Needle Gauge: 20.        Needle Length (Inches): 4        Ultrasound guided       1. Ultrasound was used to identify targeted nerve, plexus, vascular marker, or fascial plane and place a needle adjacent to it in real-time.       2. Ultrasound was used to visualize the spread of anesthetic in close proximity to the above referenced structure.       3. A permanent image is entered into the patient's record.       4. The visualized anatomic structures appeared normal.       5. There were no apparent abnormal pathologic findings.    Assessment/Narrative         The placement was negative for: blood aspirated, painful injection and site bleeding       Paresthesias: No.     Bolus given via needle..        Secured via.        Insertion/Infusion Method: Single Shot       Complications: none       Injection made incrementally with aspirations every 5 mL.    Medication(s) Administered   Bupivacaine 0.25% PF (Infiltration), 15 mL  Medication Administration Time: 12/15/2021 7:34 AM     Comments:  5 ml in each Genicular injection

## 2021-12-15 NOTE — PROVIDER NOTIFICATION
No surgical orders in place for this patient, surgeon updated, states he will take care of all of this in clinic at follow up appts.     Asked again specifically about PT/OT, and post op xray. Surgeon would like imaging done at this time, no PT/OT.

## 2021-12-15 NOTE — BRIEF OP NOTE
[facility]    Brief Operative Note    Pre-operative diagnosis: Right knee pain [M25.561]  Post-operative diagnosis Same as pre-operative diagnosis    Procedure: Procedure(s):  RIGHT TOTAL KNEE ARTHROPLASTY  Surgeon: Surgeon(s) and Role:     * Cruz Vasquez MD - Primary     * Pedro Samayoa PA - Assisting  Anesthesia: Spinal with Adductor Block   Estimated Blood Loss: Minimal    Drains: None  Specimens:   ID Type Source Tests Collected by Time Destination   1 : RIGHT KNEE BONE AND TISSUE Bone Fragments Knee, Right SURGICAL PATHOLOGY EXAM Cruz Vasquez MD 12/15/2021  9:01 AM      Findings:   None.  Complications: None.  Implants:   Implant Name Type Inv. Item Serial No.  Lot No. LRB No. Used Action   PATELLA-ASYMMETRIC SZ A40MM/11MM TRIATHLON TRITANIUM - [id number] Total Joint Component/Insert Patella-Asymmetric Sz A40mm/11mm Triathlon Tritanium  ELIZABETH [id number] Right 1 Implanted   TRIATHLON TIBIAL COMPONENT SIZE #6 Total Joint Component/Insert   ELIZABETH [id number] Right 1 Implanted   TRIATHLON CRUCIATE RETAINING FEMORAL SIZE #6 Total Joint Component/Insert   ELIZABETH [id number] Right 1 Implanted   TRIATHLON TIBIAL BEARING INSERT-CS Total Joint Component/Insert   ELIZABETH [id number] Right 1 Implanted

## 2021-12-15 NOTE — ANESTHESIA PROCEDURE NOTES
Intrathecal injection Procedure Note    Pre-Procedure   Staff -        CRNA: Aries Lancaster APRN CRNA       Other Anesthesia Staff: Key Garcia       Performed By: ROGELIO       Location: OR       Procedure Start/Stop Times: 12/15/2021 7:50 AM and 12/15/2021 7:55 AM       Pre-Anesthestic Checklist: patient identified, IV checked, risks and benefits discussed, informed consent, monitors and equipment checked, pre-op evaluation, at physician/surgeon's request and post-op pain management  Timeout:       Correct Patient: Yes        Correct Procedure: Yes        Correct Site: Yes        Correct Position: Yes   Procedure Documentation  Procedure: intrathecal injection       Diagnosis: R TKA       Patient Position: sitting       Patient Prep/Sterile Barriers: sterile gloves, mask, patient draped       Skin prep: Betadine       Insertion Site: L3-4. (midline approach).       Needle Gauge: 25.        Needle Length (Inches): 3.5        Spinal Needle Type: Celi tip       Introducer used       Introducer: 20 G       # of attempts: 1 and  # of redirects:  1    Assessment/Narrative         Paresthesias: No.       CSF fluid: clear.      Opening pressure was cmH2O while  Sitting.      Medication(s) Administered   2% Chloroprocaine PF (Intrathecal), 60 mg  Medication Administration Time: 12/15/2021 7:55 AM

## 2021-12-16 NOTE — PLAN OF CARE
Physical Therapy Discharge Summary    Reason for therapy discharge:    Discharged to home.    Progress towards therapy goal(s). See goals on Care Plan in [facility] electronic health record for goal details.  Goals not met.  Barriers to achieving goals:   discharge on same date as initial evaluation.    Therapy recommendation(s):    No further therapy is recommended.

## 2021-12-20 LAB
PATH REPORT.COMMENTS IMP SPEC: NORMAL
PATH REPORT.COMMENTS IMP SPEC: NORMAL
PATH REPORT.FINAL DX SPEC: NORMAL
PATH REPORT.GROSS SPEC: NORMAL
PATH REPORT.MICROSCOPIC SPEC OTHER STN: NORMAL
PATH REPORT.PRELIMIN DX SPEC-IMP: NORMAL
PHOTO IMAGE: NORMAL

## 2021-12-20 PROCEDURE — 88300 SURGICAL PATH GROSS: CPT | Mod: 26 | Performed by: PATHOLOGY

## 2022-03-21 ENCOUNTER — TELEPHONE (OUTPATIENT)
Dept: FAMILY MEDICINE | Facility: OTHER | Age: 72
End: 2022-03-21
Payer: COMMERCIAL

## 2022-03-21 NOTE — TELEPHONE ENCOUNTER
2:59 PM    Reason for Call: OVERBOOK    Patient is having the following symptoms: pt called because of swelling in extremities and would like to see Di    The patient is requesting an appointment for this week with Di Orr.    Was an appointment offered for this call? No  If yes : Appointment type              Date    Preferred method for responding to this message: Telephone Call  What is your phone number ?[phone]    If we cannot reach you directly, may we leave a detailed response at the number you provided? No    Can this message wait until your PCP/provider returns, if unavailable today? No    Yuni Garcia

## 2022-03-23 NOTE — PROGRESS NOTES
"  Assessment & Plan     Food allergy  He has had recurrent swelling of lips hands and feet. He isn't on any medication that would really give him any concern.  His skin is clear of rash and no lesions appreciated.  We do not do allergy testing here. We do this for [facility] in [place].  He declines over and over again.     Uncomplicated alcohol dependence (H)  No conerns today discussed.       Review of external notes as documented elsewhere in note  Ordering of each unique test  Prescription drug management  10  minutes spent on the date of the encounter doing chart review, patient visit and documentation        Tobacco Cessation:   reports that he has been smoking cigarettes. He has never used smokeless tobacco.  Tobacco Cessation Action Plan: Phone counseling: Place order for Tobacco Cessation Referral    BMI:   Estimated body mass index is 27.75 kg/m  as calculated from the following:    Height as of this encounter: 1.74 m (5' 8.5\").    Weight as of this encounter: 84 kg (185 lb 3.2 oz).       See Patient Instructions    No follow-ups on file.    JOSELO Suarez  [facility] - ALVARO Faustin   Christopher is a 72 year old who presents for the following health issues     HPI     Concern - Extremities swelling  Onset: March 1st 2022  Description: face swelling up x2, feet itchy and then swell up, hands itch and then swell up.   Intensity: mild  Progression of Symptoms:  worsening  Accompanying Signs & Symptoms: has been avoiding nuts and raw onions for a long time and just recently started to put them back in his diet. He thinks this is what is causing the issues.   Previous history of similar problem: none  Precipitating factors:        Worsened by: none  Alleviating factors:        Improved by: none  Therapies tried and outcome: has been avoiding onions and nuts this last week, no issues as of yet.         Review of Systems   Constitutional, HEENT, cardiovascular, pulmonary, gi and gu systems are " "negative, except as otherwise noted.      Objective    /82 (BP Location: Right arm, Patient Position: Sitting, Cuff Size: Adult Regular)   Pulse 90   Temp 97.6  F (36.4  C) (Tympanic)   Ht 1.74 m (5' 8.5\")   Wt 84 kg (185 lb 3.2 oz)   SpO2 96%   BMI 27.75 kg/m    Body mass index is 27.75 kg/m .  Physical Exam   GENERAL: healthy, alert and no distress  NECK: no adenopathy, no asymmetry, masses, or scars and thyroid normal to palpation  RESP: lungs clear to auscultation - no rales, rhonchi or wheezes  CV: regular rate and rhythm, normal S1 S2, no S3 or S4, no murmur, click or rub, no peripheral edema and peripheral pulses strong  ABDOMEN: soft, nontender, no hepatosplenomegaly, no masses and bowel sounds normal  MS: no gross musculoskeletal defects noted, no edema    Admission on 12/15/2021, Discharged on 12/15/2021   Component Date Value Ref Range Status     Case Report 12/15/2021    Final                    Value:Surgical Pathology Report                         Case: [id number]                                  Authorizing Provider:  Cruz Vasquez MD        Collected:           12/15/2021 09:01 AM          Ordering Location:     [facility] Main Operating Room     Received:            12/15/2021 11:11 AM          Pathologist:           Malika Fontana MD                                                                           Specimen:    Knee, Right, RIGHT KNEE BONE AND TISSUE                                                     Final Diagnosis 12/15/2021    Final                    Value:This result contains rich text formatting which cannot be displayed here.     Preliminary Diagnosis 12/15/2021    Final                    Value:This result contains rich text formatting which cannot be displayed here.     Gross Description 12/15/2021    Final                    Value:This result contains rich text formatting which cannot be " displayed here.     Microscopic Description 12/15/2021    Final                    Value:This result contains rich text formatting which cannot be displayed here.     Performing Labs 12/15/2021    Final                    Value:This result contains rich text formatting which cannot be displayed here.               Lung Cancer Screening Shared Decision Making Visit     Christopher Valentine is eligible for lung cancer screening on the basis of the information provided in my signed lung cancer screening order.     I have discussed with patient the risks and benefits of screening for lung cancer with low-dose CT.     The risks include:  radiation exposure: one low dose chest CT has as much ionizing radiation as about 15 chest x-rays or 6 months of background radiation living in [place]    false positives: 96% of positive findings/nodules are NOT cancer, but some might still require additional diagnostic evaluation, including biopsy  over-diagnosis: some slow growing cancers that might never have been clinically significant will be detected and treated unnecessarily     The benefit of early detection of lung cancer is contingent upon adherence to annual screening or more frequent follow up if indicated.     Furthermore, reaping the benefits of screening requires Christopher Valentine to be willing and physically able to undergo diagnostic procedures, if indicated. Although no specific guide is available for determining severity of comorbidities, it is reasonable to withhold screening in patients who have greater mortality risk from other diseases.     We did discuss that the only way to prevent lung cancer is to not smoke. Smoking cessation counseling was given, duration < 3 minutes.      I did offer risk estimation using a calculator such as this one:    ShouldIScreen

## 2022-03-28 ENCOUNTER — OFFICE VISIT (OUTPATIENT)
Dept: FAMILY MEDICINE | Facility: OTHER | Age: 72
End: 2022-03-28
Attending: PHYSICIAN ASSISTANT
Payer: COMMERCIAL

## 2022-03-28 VITALS
WEIGHT: 185.2 LBS | TEMPERATURE: 97.6 F | HEIGHT: 69 IN | SYSTOLIC BLOOD PRESSURE: 134 MMHG | OXYGEN SATURATION: 96 % | BODY MASS INDEX: 27.43 KG/M2 | DIASTOLIC BLOOD PRESSURE: 82 MMHG | HEART RATE: 90 BPM

## 2022-03-28 DIAGNOSIS — Z91.018 FOOD ALLERGY: Primary | ICD-10-CM

## 2022-03-28 DIAGNOSIS — Z87.891 PERSONAL HISTORY OF TOBACCO USE: ICD-10-CM

## 2022-03-28 DIAGNOSIS — F10.20 UNCOMPLICATED ALCOHOL DEPENDENCE (H): ICD-10-CM

## 2022-03-28 PROCEDURE — G0463 HOSPITAL OUTPT CLINIC VISIT: HCPCS

## 2022-03-28 PROCEDURE — G0296 VISIT TO DETERM LDCT ELIG: HCPCS | Performed by: PHYSICIAN ASSISTANT

## 2022-03-28 PROCEDURE — G0463 HOSPITAL OUTPT CLINIC VISIT: HCPCS | Mod: 25

## 2022-03-28 PROCEDURE — 99213 OFFICE O/P EST LOW 20 MIN: CPT | Mod: 25 | Performed by: PHYSICIAN ASSISTANT

## 2022-03-28 RX ORDER — METHYLPREDNISOLONE 4 MG
TABLET, DOSE PACK ORAL
Qty: 21 TABLET | Refills: 0 | Status: SHIPPED | OUTPATIENT
Start: 2022-03-28 | End: 2023-07-19

## 2022-03-28 ASSESSMENT — PAIN SCALES - GENERAL: PAINLEVEL: NO PAIN (0)

## 2022-03-28 NOTE — PATIENT INSTRUCTIONS
Lung Cancer Screening   Frequently Asked Questions  If you are at high-risk for lung cancer, getting screened with low-dose computed tomography (LDCT) every year can help save your life. This handout offers answers to some of the most common questions about lung cancer screening. If you have other questions, please call [phone] ([phone]).     What is it?  Lung cancer screening uses special X-ray technology to create an image of your lung tissue. The exam is quick and easy and takes less than 10 seconds. We don t give you any medicine or use any needles. You can eat before and after the exam. You don t need to change your clothes as long as the clothing on your chest doesn t contain metal. But, you do need to be able to hold your breath for at least 6 seconds during the exam.    What is the goal of lung cancer screening?  The goal of lung cancer screening is to save lives. Many times, lung cancer is not found until a person starts having physical symptoms. Lung cancer screening can help detect lung cancer in the earliest stages when it may be easier to treat.    Who should be screened for lung cancer?  We suggest lung cancer screening for anyone who is at high-risk for lung cancer. You are in the high-risk group if you:      are between the ages of 55 and 79, and    have smoked at least 1 pack of cigarettes a day for 20 or more years, and    still smoke or have quit within the past 15 years.    However, if you have a new cough or shortness of breath, you should talk to your doctor before being screened.    Why does it matter if I have symptoms?  Certain symptoms can be a sign that you have a condition in your lungs that should be checked and treated by your doctor. These symptoms include fever, chest pain, a new or changing cough, shortness of breath that you have never felt before, coughing up blood or unexplained weight loss. Having any of these symptoms can greatly affect the results of lung  cancer screening.       Should all smokers get an LDCT lung cancer screening exam?  It depends. Lung cancer screening is for a very specific group of men and women who have a history of heavy smoking over a long period of time (see  Who should be screened for lung cancer  above).  I am in the high-risk group, but have been diagnosed with cancer in the past. Is LDCT lung cancer screening right for me?  In some cases, you should not have LDCT lung screening, such as when your doctor is already following your cancer with CT scan studies. Your doctor will help you decide if LDCT lung screening is right for you.  Do I need to have a screening exam every year?  Yes. If you are in the high-risk group described earlier, you should get an LDCT lung cancer screening exam every year until you are 79, or are no longer willing or able to undergo screening and possible procedures to diagnose and treat lung cancer.  How effective is LDCT at preventing death from lung cancer?  Studies have shown that LDCT lung cancer screening can lower the risk of death from lung cancer by 20 percent in people who are at high-risk.  What are the risks?  There are some risks and limitations of LDCT lung cancer screening. We want to make sure you understand the risks and benefits, so please let us know if you have any questions. Your doctor may want to talk with you more about these risks.    Radiation exposure: As with any exam that uses radiation, there is a very small increased risk of cancer. The amount of radiation in LDCT is small--about the same amount a person would get from a mammogram. Your doctor orders the exam when he or she feels the potential benefits outweigh the risks.    False negatives: No test is perfect, including LDCT. It is possible that you may have a medical condition, including lung cancer, that is not found during your exam. This is called a false negative result.    False positives and more testing: LDCT very often finds  something in the lung that could be cancer, but in fact is not. This is called a false positive result. False positive tests often cause anxiety. To make sure these findings are not cancer, you may need to have more tests. These tests will be done only if you give us permission. Sometimes patients need a treatment that can have side effects, such as a biopsy. For more information on false positives, see  What can I expect from the results?     Findings not related to lung cancer: Your LDCT exam also takes pictures of areas of your body next to your lungs. In a very small number of cases, the CT scan will show an abnormal finding in one of these areas, such as your kidneys, adrenal glands, liver or thyroid. This finding may not be serious, but you may need more tests. Your doctor can help you decide what other tests you may need, if any.  What can I expect from the results?  About 1 out of 4 LDCT exams will find something that may need more tests. Most of the time, these findings are lung nodules. Lung nodules are very small collections of tissue in the lung. These nodules are very common, and the vast majority--more than 97 percent--are not cancer (benign). Most are normal lymph nodes or small areas of scarring from past infections.  But, if a small lung nodule is found to be cancer, the cancer can be cured more than 90 percent of the time. To know if the nodule is cancer, we may need to get more images before your next yearly screening exam. If the nodule has suspicious features (for example, it is large, has an odd shape or grows over time), we will refer you to a specialist for further testing.  Will my doctor also get the results?  Yes. Your doctor will get a copy of your results.  Is it okay to keep smoking now that there s a cancer screening exam?  No. Tobacco is one of the strongest cancer-causing agents. It causes not only lung cancer, but other cancers and cardiovascular (heart) diseases as well. The damage  caused by smoking builds over time. This means that the longer you smoke, the higher your risk of disease. While it is never too late to quit, the sooner you quit, the better.  Where can I find help to quit smoking?  The best way to prevent lung cancer is to stop smoking. If you have already quit smoking, congratulations and keep it up! For help on quitting smoking, please call [employer] at 9-968-QUITNOW (1-659.475.5615) or the American [employer] at 1-546.781.6985 to find local resources near you.  One-on-one health coaching:  If you d prefer to work individually with a health care provider on tobacco cessation, we offer:      Medication Therapy Management:  Our specially trained pharmacists work closely with you and your doctor to help you quit smoking.  Call [phone] or [phone] (toll free).

## 2022-03-28 NOTE — NURSING NOTE
"Chief Complaint   Patient presents with     extremities swelling up       Initial /82 (BP Location: Right arm, Patient Position: Sitting, Cuff Size: Adult Regular)   Pulse 90   Temp 97.6  F (36.4  C) (Tympanic)   Ht 1.74 m (5' 8.5\")   Wt 84 kg (185 lb 3.2 oz)   SpO2 96%   BMI 27.75 kg/m   Estimated body mass index is 27.75 kg/m  as calculated from the following:    Height as of this encounter: 1.74 m (5' 8.5\").    Weight as of this encounter: 84 kg (185 lb 3.2 oz).  Medication Reconciliation: complete  Kay Angelo LPN  "

## 2022-03-29 ENCOUNTER — HOSPITAL ENCOUNTER (OUTPATIENT)
Dept: CT IMAGING | Facility: HOSPITAL | Age: 72
Discharge: HOME | End: 2022-03-29
Attending: PHYSICIAN ASSISTANT | Admitting: PHYSICIAN ASSISTANT
Payer: COMMERCIAL

## 2022-03-29 DIAGNOSIS — Z87.891 PERSONAL HISTORY OF TOBACCO USE: ICD-10-CM

## 2022-03-29 PROCEDURE — 71271 CT THORAX LUNG CANCER SCR C-: CPT

## 2023-03-01 ENCOUNTER — TELEPHONE (OUTPATIENT)
Dept: FAMILY MEDICINE | Facility: OTHER | Age: 73
End: 2023-03-01

## 2023-07-19 ENCOUNTER — OFFICE VISIT (OUTPATIENT)
Dept: FAMILY MEDICINE | Facility: OTHER | Age: 73
End: 2023-07-19
Attending: PHYSICIAN ASSISTANT
Payer: COMMERCIAL

## 2023-07-19 VITALS
RESPIRATION RATE: 18 BRPM | WEIGHT: 184 LBS | BODY MASS INDEX: 27.57 KG/M2 | SYSTOLIC BLOOD PRESSURE: 118 MMHG | OXYGEN SATURATION: 96 % | DIASTOLIC BLOOD PRESSURE: 60 MMHG | HEART RATE: 71 BPM | TEMPERATURE: 97.2 F

## 2023-07-19 DIAGNOSIS — F10.20 UNCOMPLICATED ALCOHOL DEPENDENCE (H): ICD-10-CM

## 2023-07-19 DIAGNOSIS — E78.5 HYPERLIPIDEMIA LDL GOAL <100: ICD-10-CM

## 2023-07-19 DIAGNOSIS — K62.3 RECTAL MUCOSA PROLAPSE: ICD-10-CM

## 2023-07-19 DIAGNOSIS — Z12.5 SCREENING PSA (PROSTATE SPECIFIC ANTIGEN): ICD-10-CM

## 2023-07-19 DIAGNOSIS — Z00.00 ENCOUNTER FOR MEDICARE ANNUAL WELLNESS EXAM: Primary | ICD-10-CM

## 2023-07-19 PROCEDURE — G0463 HOSPITAL OUTPT CLINIC VISIT: HCPCS

## 2023-07-19 PROCEDURE — G0439 PPPS, SUBSEQ VISIT: HCPCS | Performed by: PHYSICIAN ASSISTANT

## 2023-07-19 ASSESSMENT — ENCOUNTER SYMPTOMS
HEADACHES: 0
MYALGIAS: 0
DYSURIA: 0
HEMATOCHEZIA: 0
CONSTIPATION: 0
PALPITATIONS: 0
FREQUENCY: 0
CHILLS: 0
COUGH: 0
DIARRHEA: 0
FEVER: 0
NERVOUS/ANXIOUS: 0
SHORTNESS OF BREATH: 0
SORE THROAT: 0
EYE PAIN: 0
WEAKNESS: 0
HEMATURIA: 0
ABDOMINAL PAIN: 0
NAUSEA: 0
HEARTBURN: 0
PARESTHESIAS: 0
DIZZINESS: 0
ARTHRALGIAS: 0
JOINT SWELLING: 0

## 2023-07-19 ASSESSMENT — ACTIVITIES OF DAILY LIVING (ADL): CURRENT_FUNCTION: NO ASSISTANCE NEEDED

## 2023-07-19 ASSESSMENT — PAIN SCALES - GENERAL: PAINLEVEL: NO PAIN (0)

## 2023-07-19 NOTE — PROGRESS NOTES
"SUBJECTIVE:   Christopher is a 73 year old who presents for Preventive Visit.       No data to display              Are you in the first 12 months of your Medicare coverage?  No    Healthy Habits:     In general, how would you rate your overall health?  Good    Frequency of exercise:  6-7 days/week    Duration of exercise:  45-60 minutes    Do you usually eat at least 4 servings of fruit and vegetables a day, include whole grains    & fiber and avoid regularly eating high fat or \"junk\" foods?  No    Taking medications regularly:  Yes    Medication side effects:  None    Ability to successfully perform activities of daily living:  No assistance needed    Home Safety:  No safety concerns identified    Hearing Impairment:  No hearing concerns    In the past 6 months, have you been bothered by leaking of urine?  No    In general, how would you rate your overall mental or emotional health?  Good    Additional concerns today:  No        Have you ever done Advance Care Planning? (For example, a Health Directive, POLST, or a discussion with a medical provider or your loved ones about your wishes): No, advance care planning information given to patient to review.  Patient declined advance care planning discussion at this time.       Fall risk  Fallen 2 or more times in the past year?: No  Any fall with injury in the past year?: No    Cognitive Screening   1) Repeat 3 items (Leader, Season, Table)    2) Clock draw: normal but on the small side   3) 3 item recall: Recalls 2 objects   Results: NORMAL clock, 1-2 items recalled: COGNITIVE IMPAIRMENT LESS LIKELY    Mini-CogTM Copyright PEDRITO Ricci. Licensed by the author for use in [facility]; reprinted with permission (salena@.[facility]). All rights reserved.      Do you have sleep apnea, excessive snoring or daytime drowsiness?: no    Reviewed and updated as needed this visit by clinical staff   Tobacco  Allergies               Reviewed and updated as needed this visit by " Provider                 Social History     Tobacco Use     Smoking status: Light Smoker     Types: Cigarettes     Smokeless tobacco: Never   Substance Use Topics     Alcohol use: Yes     Comment: beer daily              7/19/2023     8:43 AM   Alcohol Use   Prescreen: >3 drinks/day or >7 drinks/week? No     Do you have a current opioid prescription? No  Do you use any other controlled substances or medications that are not prescribed by a provider? None              Current providers sharing in care for this patient include:   Patient Care Team:  Di Orr PA as PCP - General (Family Medicine)  Di Orr PA as Assigned PCP    The following health maintenance items are reviewed in Epic and correct as of today:  Health Maintenance   Topic Date Due     ADVANCE CARE PLANNING  Never done     DTAP/TDAP/TD IMMUNIZATION (1 - Tdap) Never done     MEDICARE ANNUAL WELLNESS VISIT  Never done     COVID-19 Vaccine (3 - Moderna series) 01/24/2022     LIPID  03/05/2022     Pneumococcal Vaccine: 65+ Years (2 - PCV) 04/02/2022     COLORECTAL CANCER SCREENING  04/15/2022     LUNG CANCER SCREENING  03/29/2023     INFLUENZA VACCINE (1) 09/01/2023     FALL RISK ASSESSMENT  07/19/2024     HEPATITIS C SCREENING  Completed     PHQ-2 (once per calendar year)  Completed     ZOSTER IMMUNIZATION  Completed     AORTIC ANEURYSM SCREENING (SYSTEM ASSIGNED)  Completed     IPV IMMUNIZATION  Aged Out     MENINGITIS IMMUNIZATION  Aged Out     Lab work is in process  Labs reviewed in EPIC  BP Readings from Last 3 Encounters:   07/19/23 118/60   03/28/22 134/82   12/15/21 137/99    Wt Readings from Last 3 Encounters:   07/19/23 83.5 kg (184 lb)   03/28/22 84 kg (185 lb 3.2 oz)   12/15/21 86.5 kg (190 lb 12.8 oz)                  Patient Active Problem List   Diagnosis     Special screening for malignant neoplasms, colon     Uncomplicated alcohol dependence (H)     Tobacco use disorder     High cholesterol     Past Surgical History:    Procedure Laterality Date     ANKLE SURGERY  2000    LT, hardware removal and ankle fusion; traumatic arthritis     ARTHROPLASTY KNEE Right 12/15/2021    Procedure: RIGHT TOTAL KNEE ARTHROPLASTY;  Surgeon: Cruz Vasquez MD;  Location: [facility] OR     COLONOSCOPY  2011    with polypectomy     COLONOSCOPY N/A 4/15/2021    Procedure: Colonoscopy, with  polypectomy;  Surgeon: Sam Smith MD;  Location: [facility] OR       Social History     Tobacco Use     Smoking status: Light Smoker     Types: Cigarettes     Smokeless tobacco: Never   Substance Use Topics     Alcohol use: Yes     Comment: beer daily      Family History   Problem Relation Age of Onset     Cancer - colorectal Father         (cause of death)      Cancer Father      Alzheimer Disease Mother          Current Outpatient Medications   Medication Sig Dispense Refill     acetaminophen (TYLENOL) 325 MG tablet Take 2 tablets (650 mg) by mouth every 4 hours as needed for other (mild pain) 100 tablet 0     aspirin (ASA) 325 MG EC tablet Take 1 tablet (325 mg) by mouth daily 30 tablet 0     hydrOXYzine (ATARAX) 10 MG tablet Take 1 tablet (10 mg) by mouth every 6 hours as needed for itching or anxiety (with pain, moderate pain) 30 tablet 0     No Known Allergies  Recent Labs   Lab Test 12/01/21  1404 03/05/21  1439   LDL  --  85   HDL  --  52   TRIG  --  258*   ALT 30 33   CR 0.78 0.72   GFRESTIMATED >90 >90   GFRESTBLACK  --  >90   POTASSIUM 3.8 3.8   TSH  --  1.31                  Review of Systems   Constitutional: Negative for chills and fever.   HENT: Negative for congestion, ear pain, hearing loss and sore throat.    Eyes: Negative for pain and visual disturbance.   Respiratory: Negative for cough and shortness of breath.    Cardiovascular: Negative for chest pain, palpitations and peripheral edema.   Gastrointestinal: Negative for abdominal pain, constipation, diarrhea, heartburn, hematochezia and nausea.   Genitourinary: Negative for dysuria, frequency, genital  "sores, hematuria, impotence, penile discharge and urgency.   Musculoskeletal: Negative for arthralgias, joint swelling and myalgias.   Skin: Negative for rash.   Neurological: Negative for dizziness, weakness, headaches and paresthesias.   Psychiatric/Behavioral: Negative for mood changes. The patient is not nervous/anxious.          OBJECTIVE:   /60 (BP Location: Left arm, Patient Position: Sitting, Cuff Size: Adult Regular)   Pulse 71   Temp 97.2  F (36.2  C) (Tympanic)   Resp 18   Wt 83.5 kg (184 lb)   SpO2 96%   BMI 27.57 kg/m   Estimated body mass index is 27.57 kg/m  as calculated from the following:    Height as of 3/28/22: 1.74 m (5' 8.5\").    Weight as of this encounter: 83.5 kg (184 lb).  Physical Exam  GENERAL: healthy, alert and no distress  EYES: Eyes grossly normal to inspection, PERRL and conjunctivae and sclerae normal  HENT: ear canals and TM's normal, nose and mouth without ulcers or lesions  NECK: no adenopathy, no asymmetry, masses, or scars and thyroid normal to palpation  RESP: lungs clear to auscultation - no rales, rhonchi or wheezes  CV: regular rate and rhythm, normal S1 S2, no S3 or S4, no murmur, click or rub, no peripheral edema and peripheral pulses strong  ABDOMEN: soft, nontender, no hepatosplenomegaly, no masses and bowel sounds normal  MS: no gross musculoskeletal defects noted, no edema  SKIN: no suspicious lesions or rashes  NEURO: Normal strength and tone, mentation intact and speech normal  PSYCH: mentation appears normal, affect normal/bright  LYMPH: no cervical, supraclavicular, axillary, or inguinal adenopathy    Diagnostic Test Results:  Labs reviewed in Epic  No results found for this or any previous visit (from the past 24 hour(s)).    ASSESSMENT / PLAN:       ICD-10-CM    1. Encounter for Medicare annual wellness exam  Z00.00       2. Rectal mucosa prolapse  K62.3 Adult General Surg Referral     CBC with platelets and differential     Comprehensive metabolic " panel (BMP + Alb, Alk Phos, ALT, AST, Total. Bili, TP)      3. Screening PSA (prostate specific antigen)  Z12.5 PSA, screen      4. Hyperlipidemia LDL goal <100  E78.5 Lipid Profile (Chol, Trig, HDL, LDL calc)     TSH with free T4 reflex      5. Uncomplicated alcohol dependence (H)  F10.20           Patient has been advised of split billing requirements and indicates understanding: Yes      COUNSELING:  Reviewed preventive health counseling, as reflected in patient instructions       Regular exercise       Healthy diet/nutrition       Vision screening       Bladder control       Fall risk prevention       Immunizations    Vaccinated for: Pneumococcal, Td and TDAP  (pt referred to pharmacy for payment of the immunizations. )           Alcohol Use        Colon cancer screening       Prostate cancer screening        He reports that he has been smoking cigarettes. He has never used smokeless tobacco.  Nicotine/Tobacco Cessation Plan:   Self help information given to patient      Appropriate preventive services were discussed with this patient, including applicable screening as appropriate for cardiovascular disease, diabetes, osteopenia/osteoporosis, and glaucoma.  As appropriate for age/gender, discussed screening for colorectal cancer, prostate cancer, breast cancer, and cervical cancer. Checklist reviewing preventive services available has been given to the patient.    Reviewed patients plan of care and provided an AVS. The Basic Care Plan (routine screening as documented in Health Maintenance) for Christopher meets the Care Plan requirement. This Care Plan has been established and reviewed with the Patient.        JOSELO Suarez  [facility] [place]    Identified Health Risks:    I have reviewed Opioid Use Disorder and Substance Use Disorder risk factors and made any needed referrals.

## 2023-07-20 ENCOUNTER — PREP FOR PROCEDURE (OUTPATIENT)
Dept: SURGERY | Facility: OTHER | Age: 73
End: 2023-07-20

## 2023-07-20 ENCOUNTER — LAB (OUTPATIENT)
Dept: LAB | Facility: OTHER | Age: 73
End: 2023-07-20
Attending: PHYSICIAN ASSISTANT
Payer: COMMERCIAL

## 2023-07-20 ENCOUNTER — HOSPITAL ENCOUNTER (OUTPATIENT)
Facility: HOSPITAL | Age: 73
End: 2023-07-20
Attending: SURGERY | Admitting: SURGERY
Payer: COMMERCIAL

## 2023-07-20 ENCOUNTER — OFFICE VISIT (OUTPATIENT)
Dept: SURGERY | Facility: OTHER | Age: 73
End: 2023-07-20
Attending: PHYSICIAN ASSISTANT
Payer: COMMERCIAL

## 2023-07-20 VITALS
OXYGEN SATURATION: 94 % | RESPIRATION RATE: 18 BRPM | HEIGHT: 68 IN | DIASTOLIC BLOOD PRESSURE: 72 MMHG | SYSTOLIC BLOOD PRESSURE: 122 MMHG | WEIGHT: 184 LBS | TEMPERATURE: 98.2 F | HEART RATE: 83 BPM | BODY MASS INDEX: 27.89 KG/M2

## 2023-07-20 DIAGNOSIS — Z86.0100 HISTORY OF COLON POLYPS: Primary | ICD-10-CM

## 2023-07-20 DIAGNOSIS — E78.5 HYPERLIPIDEMIA LDL GOAL <100: ICD-10-CM

## 2023-07-20 DIAGNOSIS — Z12.5 SCREENING PSA (PROSTATE SPECIFIC ANTIGEN): ICD-10-CM

## 2023-07-20 DIAGNOSIS — K62.3 RECTAL MUCOSA PROLAPSE: ICD-10-CM

## 2023-07-20 DIAGNOSIS — Z12.11 COLON CANCER SCREENING: Primary | ICD-10-CM

## 2023-07-20 LAB
ALBUMIN SERPL BCG-MCNC: 4 G/DL (ref 3.5–5.2)
ALP SERPL-CCNC: 101 U/L (ref 40–129)
ALT SERPL W P-5'-P-CCNC: 25 U/L (ref 0–70)
ANION GAP SERPL CALCULATED.3IONS-SCNC: 13 MMOL/L (ref 7–15)
AST SERPL W P-5'-P-CCNC: 35 U/L (ref 0–45)
BASOPHILS # BLD AUTO: 0.1 10E3/UL (ref 0–0.2)
BASOPHILS NFR BLD AUTO: 1 %
BILIRUB SERPL-MCNC: 1 MG/DL
BUN SERPL-MCNC: 7.1 MG/DL (ref 8–23)
CALCIUM SERPL-MCNC: 9.4 MG/DL (ref 8.8–10.2)
CHLORIDE SERPL-SCNC: 101 MMOL/L (ref 98–107)
CHOLEST SERPL-MCNC: 210 MG/DL
CREAT SERPL-MCNC: 0.69 MG/DL (ref 0.67–1.17)
DEPRECATED HCO3 PLAS-SCNC: 23 MMOL/L (ref 22–29)
EOSINOPHIL # BLD AUTO: 0.2 10E3/UL (ref 0–0.7)
EOSINOPHIL NFR BLD AUTO: 3 %
ERYTHROCYTE [DISTWIDTH] IN BLOOD BY AUTOMATED COUNT: 12.2 % (ref 10–15)
GFR SERPL CREATININE-BSD FRML MDRD: >90 ML/MIN/1.73M2
GLUCOSE SERPL-MCNC: 84 MG/DL (ref 70–99)
HCT VFR BLD AUTO: 44.1 % (ref 40–53)
HDLC SERPL-MCNC: 61 MG/DL
HGB BLD-MCNC: 15.5 G/DL (ref 13.3–17.7)
IMM GRANULOCYTES # BLD: 0 10E3/UL
IMM GRANULOCYTES NFR BLD: 0 %
LDLC SERPL CALC-MCNC: 99 MG/DL
LYMPHOCYTES # BLD AUTO: 2.1 10E3/UL (ref 0.8–5.3)
LYMPHOCYTES NFR BLD AUTO: 39 %
MCH RBC QN AUTO: 33.4 PG (ref 26.5–33)
MCHC RBC AUTO-ENTMCNC: 35.1 G/DL (ref 31.5–36.5)
MCV RBC AUTO: 95 FL (ref 78–100)
MONOCYTES # BLD AUTO: 0.5 10E3/UL (ref 0–1.3)
MONOCYTES NFR BLD AUTO: 9 %
NEUTROPHILS # BLD AUTO: 2.6 10E3/UL (ref 1.6–8.3)
NEUTROPHILS NFR BLD AUTO: 48 %
NONHDLC SERPL-MCNC: 149 MG/DL
NRBC # BLD AUTO: 0 10E3/UL
NRBC BLD AUTO-RTO: 0 /100
PLATELET # BLD AUTO: 239 10E3/UL (ref 150–450)
POTASSIUM SERPL-SCNC: 3.9 MMOL/L (ref 3.4–5.3)
PROT SERPL-MCNC: 7.1 G/DL (ref 6.4–8.3)
PSA SERPL DL<=0.01 NG/ML-MCNC: 2.78 NG/ML (ref 0–6.5)
RBC # BLD AUTO: 4.64 10E6/UL (ref 4.4–5.9)
SODIUM SERPL-SCNC: 137 MMOL/L (ref 136–145)
TRIGL SERPL-MCNC: 251 MG/DL
TSH SERPL DL<=0.005 MIU/L-ACNC: 1.58 UIU/ML (ref 0.3–4.2)
WBC # BLD AUTO: 5.4 10E3/UL (ref 4–11)

## 2023-07-20 PROCEDURE — G0103 PSA SCREENING: HCPCS | Mod: ZL

## 2023-07-20 PROCEDURE — 85025 COMPLETE CBC W/AUTO DIFF WBC: CPT | Mod: ZL

## 2023-07-20 PROCEDURE — 84443 ASSAY THYROID STIM HORMONE: CPT | Mod: ZL

## 2023-07-20 PROCEDURE — G0463 HOSPITAL OUTPT CLINIC VISIT: HCPCS | Performed by: SURGERY

## 2023-07-20 PROCEDURE — 80053 COMPREHEN METABOLIC PANEL: CPT | Mod: ZL

## 2023-07-20 PROCEDURE — 99203 OFFICE O/P NEW LOW 30 MIN: CPT | Performed by: SURGERY

## 2023-07-20 PROCEDURE — 36415 COLL VENOUS BLD VENIPUNCTURE: CPT | Mod: ZL

## 2023-07-20 PROCEDURE — 80061 LIPID PANEL: CPT | Mod: ZL

## 2023-07-20 ASSESSMENT — PAIN SCALES - GENERAL: PAINLEVEL: NO PAIN (0)

## 2023-07-20 NOTE — LETTER
July 27, 2023      Christopher HARRIS Meme  [address]  [place] [address]        Dear ,    We are writing to inform you of your test results.    Liver looks ok. But your cholesterol is higher on Triglycerides.     Resulted Orders   Lipid Profile (Chol, Trig, HDL, LDL calc)   Result Value Ref Range    Cholesterol 210 (H) <200 mg/dL    Triglycerides 251 (H) <150 mg/dL    Direct Measure HDL 61 >=40 mg/dL    LDL Cholesterol Calculated 99 <=100 mg/dL    Non HDL Cholesterol 149 (H) <130 mg/dL    Narrative    Cholesterol  Desirable:  <200 mg/dL    Triglycerides  Normal:  Less than 150 mg/dL  Borderline High:  150-199 mg/dL  High:  200-499 mg/dL  Very High:  Greater than or equal to 500 mg/dL    Direct Measure HDL  Female:  Greater than or equal to 50 mg/dL   Male:  Greater than or equal to 40 mg/dL    LDL Cholesterol  Desirable:  <100mg/dL  Above Desirable:  100-129 mg/dL   Borderline High:  130-159 mg/dL   High:  160-189 mg/dL   Very High:  >= 190 mg/dL    Non HDL Cholesterol  Desirable:  130 mg/dL  Above Desirable:  130-159 mg/dL  Borderline High:  160-189 mg/dL  High:  190-219 mg/dL  Very High:  Greater than or equal to 220 mg/dL   Comprehensive metabolic panel (BMP + Alb, Alk Phos, ALT, AST, Total. Bili, TP)   Result Value Ref Range    Sodium 137 136 - 145 mmol/L    Potassium 3.9 3.4 - 5.3 mmol/L    Chloride 101 98 - 107 mmol/L    Carbon Dioxide (CO2) 23 22 - 29 mmol/L    Anion Gap 13 7 - 15 mmol/L    Urea Nitrogen 7.1 (L) 8.0 - 23.0 mg/dL    Creatinine 0.69 0.67 - 1.17 mg/dL    Calcium 9.4 8.8 - 10.2 mg/dL    Glucose 84 70 - 99 mg/dL    Alkaline Phosphatase 101 40 - 129 U/L    AST 35 0 - 45 U/L      Comment:      Reference intervals for this test were updated on 6/12/2023 to more accurately reflect our healthy population. There may be differences in the flagging of prior results with similar values performed with this method. Interpretation of those prior results can be made in the context of the updated  reference intervals.    ALT 25 0 - 70 U/L      Comment:      Reference intervals for this test were updated on 6/12/2023 to more accurately reflect our healthy population. There may be differences in the flagging of prior results with similar values performed with this method. Interpretation of those prior results can be made in the context of the updated reference intervals.      Protein Total 7.1 6.4 - 8.3 g/dL    Albumin 4.0 3.5 - 5.2 g/dL    Bilirubin Total 1.0 <=1.2 mg/dL    GFR Estimate >90 >60 mL/min/1.73m2   TSH with free T4 reflex   Result Value Ref Range    TSH 1.58 0.30 - 4.20 uIU/mL   PSA, screen   Result Value Ref Range    Prostate Specific Antigen Screen 2.78 0.00 - 6.50 ng/mL    Narrative    This result is obtained using the Roche Elecsys total PSA method on the israel e601 immunoassay analyzer. Results obtained with different assay methods or kits cannot be used interchangeably.   CBC with platelets and differential   Result Value Ref Range    WBC Count 5.4 4.0 - 11.0 10e3/uL    RBC Count 4.64 4.40 - 5.90 10e6/uL    Hemoglobin 15.5 13.3 - 17.7 g/dL    Hematocrit 44.1 40.0 - 53.0 %    MCV 95 78 - 100 fL    MCH 33.4 (H) 26.5 - 33.0 pg    MCHC 35.1 31.5 - 36.5 g/dL    RDW 12.2 10.0 - 15.0 %    Platelet Count 239 150 - 450 10e3/uL    % Neutrophils 48 %    % Lymphocytes 39 %    % Monocytes 9 %    % Eosinophils 3 %    % Basophils 1 %    % Immature Granulocytes 0 %    NRBCs per 100 WBC 0 <1 /100    Absolute Neutrophils 2.6 1.6 - 8.3 10e3/uL    Absolute Lymphocytes 2.1 0.8 - 5.3 10e3/uL    Absolute Monocytes 0.5 0.0 - 1.3 10e3/uL    Absolute Eosinophils 0.2 0.0 - 0.7 10e3/uL    Absolute Basophils 0.1 0.0 - 0.2 10e3/uL    Absolute Immature Granulocytes 0.0 <=0.4 10e3/uL    Absolute NRBCs 0.0 10e3/uL       If you have any questions or concerns, please call the clinic at the number listed above.       Sincerely,      JOSELO Chawla

## 2023-07-25 RX ORDER — BISACODYL 5 MG/1
5 TABLET, DELAYED RELEASE ORAL DAILY PRN
Qty: 4 TABLET | Refills: 0 | Status: ON HOLD | OUTPATIENT
Start: 2023-07-25 | End: 2023-09-28

## 2023-07-25 NOTE — PROGRESS NOTES
Surgery Consult Clinic Note      RE: Christopher Valentine  : 1950  FRANKI: 2023      Chief Complaint:  Colon Cancer Screening     History of Present Illness:  Christopher Valentine is a very pleasant 73 year old year old male who I am seeing at the request of Di Orr for evaluation of screening colon malignant neoplasm and consideration for colonoscopy.  He denies, blood in stool, changes in bowel habits, weight loss, abdominal pain. He denies prolapse of mucosa when he has bowel movement. He did have a colonoscopy in  where he was noted to have 9 adenomatous polyps. His father did have colon cancer.   Surgical hx: see below.   He specifically denies fever, chills, nausea, vomiting, chest pain, shortness of breath or palpitations.      Medical history:  Past Medical History:   Diagnosis Date    Alcohol Abuse 2011    Dyslipidemia 2011       Surgical history:  Past Surgical History:   Procedure Laterality Date    ANKLE SURGERY      LT, hardware removal and ankle fusion; traumatic arthritis    ARTHROPLASTY KNEE Right 12/15/2021    Procedure: RIGHT TOTAL KNEE ARTHROPLASTY;  Surgeon: Cruz Vasquez MD;  Location: [facility]    COLONOSCOPY      with polypectomy    COLONOSCOPY N/A 4/15/2021    Procedure: Colonoscopy, with  polypectomy;  Surgeon: Sam Smith MD;  Location: [facility]       Family history:  Family History   Problem Relation Age of Onset    Cancer - colorectal Father         (cause of death)     Cancer Father     Alzheimer Disease Mother        Medications:  Current Outpatient Medications   Medication Sig Dispense Refill    bisacodyl (DULCOLAX) 5 MG EC tablet Take 1 tablet (5 mg) by mouth daily as needed for constipation Per handout 4 tablet 0    polyethylene glycol (GOLYTELY) 236 g suspension Take 4,000 mLs by mouth once for 1 dose Per handout 4000 mL 0    acetaminophen (TYLENOL) 325 MG tablet Take 2 tablets (650 mg) by mouth every 4 hours as needed for other (mild pain) 100  "tablet 0    aspirin (ASA) 325 MG EC tablet Take 1 tablet (325 mg) by mouth daily 30 tablet 0    hydrOXYzine (ATARAX) 10 MG tablet Take 1 tablet (10 mg) by mouth every 6 hours as needed for itching or anxiety (with pain, moderate pain) 30 tablet 0     Allergies:  The patienthas No Known Allergies.  .  Social history:  Social History     Tobacco Use    Smoking status: Light Smoker     Types: Cigarettes    Smokeless tobacco: Never   Substance Use Topics    Alcohol use: Yes     Comment: beer daily      Marital status: .  Occupation: retired .    Review of Systems:  10 point review of systems was obtained and negative other than what previously mentioned in HPI    Physical Examination:  /72 (BP Location: Right arm, Patient Position: Sitting, Cuff Size: Adult Regular)   Pulse 83   Temp 98.2  F (36.8  C) (Tympanic)   Resp 18   Ht 1.727 m (5' 8\")   Wt 83.5 kg (184 lb)   SpO2 94%   BMI 27.98 kg/m    General: AAOx4, NAD, WN/WD, ambulating without assistance  HEENT:NCAT, EOMI, PERRL Sclerae anicteric; Trachea mideline,   Chest:  No acute distress   Cardiac:  regular rate and rhythm  Abdomen: non-distended   Extremities: Cursory exam unremarkable.  Skin: Warm, dry,   Neuro: no focal deficit,   Psych: happy, calm, asks appropriate questions      Assessment/Plan:  73 y.o. male due for repeat colonoscopy with numerous adenomas removed at most recent scope.   Satisfactory candidate for colonoscopy.  The indications, risks, benefits and technical aspects of whole colon colonoscopy were outlined with risks including, but not limited to, perforation, bleeding and inability to visualize entire colon.  Management of each was reviewed.  The need of mechanical preparation of the colon was reviewed along with the use of monitored anesthetic care.  The patient's questions were asked and answered.      Sam Smith MD      "

## 2023-08-17 RX ORDER — ONDANSETRON 4 MG/1
4 TABLET, ORALLY DISINTEGRATING ORAL EVERY 30 MIN PRN
Status: CANCELLED | OUTPATIENT
Start: 2023-08-17

## 2023-08-17 RX ORDER — SODIUM CHLORIDE, SODIUM LACTATE, POTASSIUM CHLORIDE, CALCIUM CHLORIDE 600; 310; 30; 20 MG/100ML; MG/100ML; MG/100ML; MG/100ML
INJECTION, SOLUTION INTRAVENOUS CONTINUOUS
Status: CANCELLED | OUTPATIENT
Start: 2023-08-17

## 2023-08-17 RX ORDER — ONDANSETRON 2 MG/ML
4 INJECTION INTRAMUSCULAR; INTRAVENOUS EVERY 30 MIN PRN
Status: CANCELLED | OUTPATIENT
Start: 2023-08-17

## 2023-08-17 RX ORDER — LIDOCAINE 40 MG/G
CREAM TOPICAL
Status: CANCELLED | OUTPATIENT
Start: 2023-08-17

## 2023-08-21 ENCOUNTER — TELEPHONE (OUTPATIENT)
Dept: FAMILY MEDICINE | Facility: OTHER | Age: 73
End: 2023-08-21

## 2023-08-21 DIAGNOSIS — Z12.11 ENCOUNTER FOR SCREENING COLONOSCOPY: Primary | ICD-10-CM

## 2023-08-21 RX ORDER — BISACODYL 5 MG/1
10 TABLET, DELAYED RELEASE ORAL ONCE
Qty: 2 TABLET | Refills: 0 | Status: SHIPPED | OUTPATIENT
Start: 2023-08-21 | End: 2023-08-21

## 2023-08-21 NOTE — OR NURSING
Called patient multiple times.  No answer and no voicemail.  Called  and she states they are driving right now and he can't talk.  He thought his colonoscopy was 09/22/23.  He will call back to reschedule to different day.  OR  and Dr. Smith office notified.

## 2023-08-21 NOTE — TELEPHONE ENCOUNTER
Screening Questions for the Scheduling of Screening Colonoscopies     (If Colonoscopy is diagnostic, Provider should review the chart before scheduling.)    Are you younger than 50 or older than 80?  No    Do you take aspirin or fish oil?  No (if yes, tell patient to stop 1 week prior to Colonoscopy)    Do you take warfarin (Coumadin), clopidogrel (Plavix), apixaban (Eliquis), dabigatram (Pradaxa), rivaroxaban (Xarelto) or any blood thinner? No    Do you use oxygen at home?  No    Do you have kidney disease? No    Are you on dialysis? No    Have you had a stroke or heart attack in the last year? No    Have you had a stent in your heart or any blood vessel in the last year? No    Have you had a transplant of any organ?  No    Have you had a colonoscopy or upper endoscopy (EGD) before?  YES. Date-.         Date of scheduled Colonoscopy: 9/28/23    Provider: Dr. JOSESITO Matthew     Pharmacy [facility]

## 2023-09-25 ENCOUNTER — ANESTHESIA EVENT (OUTPATIENT)
Dept: SURGERY | Facility: HOSPITAL | Age: 73
End: 2023-09-25
Payer: COMMERCIAL

## 2023-09-25 NOTE — ANESTHESIA PREPROCEDURE EVALUATION
Anesthesia Pre-Procedure Evaluation    Patient: Christopher Valentine   MRN: [id number] : 1950        Procedure : Procedure(s):  COLONOSCOPY          Past Medical History:   Diagnosis Date     Alcohol Abuse 2011     Dyslipidemia 2011      Past Surgical History:   Procedure Laterality Date     ANKLE SURGERY      LT, hardware removal and ankle fusion; traumatic arthritis     ARTHROPLASTY KNEE Right 12/15/2021    Procedure: RIGHT TOTAL KNEE ARTHROPLASTY;  Surgeon: Cruz Vasquez MD;  Location: [facility]     COLONOSCOPY      with polypectomy     COLONOSCOPY N/A 4/15/2021    Procedure: Colonoscopy, with  polypectomy;  Surgeon: Sam Smith MD;  Location: [facility]      No Known Allergies   Social History     Tobacco Use     Smoking status: Light Smoker     Types: Cigarettes     Smokeless tobacco: Never   Substance Use Topics     Alcohol use: Yes     Comment: beer daily       Wt Readings from Last 1 Encounters:   23 83.5 kg (184 lb)        Anesthesia Evaluation   Pt has had prior anesthetic. Type: MAC and General.    No history of anesthetic complications       ROS/MED HX  ENT/Pulmonary:     (+)                tobacco use (cigs),                       Neurologic:       Cardiovascular:     (+) Dyslipidemia - -   -  - -                                      METS/Exercise Tolerance: >4 METS Comment: Cuts wood   Hematologic:  - neg hematologic  ROS     Musculoskeletal:  - neg musculoskeletal ROS     GI/Hepatic:     (+) GERD, Asymptomatic on medication,      bowel prep,            Renal/Genitourinary:  - neg Renal ROS     Endo:  - neg endo ROS     Psychiatric/Substance Use:     (+)   alcohol abuse (12 pack per day beer)      Infectious Disease:  - neg infectious disease ROS     Malignancy:  - neg malignancy ROS     Other:  - neg other ROS          Physical Exam    Airway        Mallampati: I   TM distance: > 3 FB   Neck ROM: full   Mouth opening: > 3 cm    Respiratory Devices and Support          Dental     Comment: Top and bottom dentures    (+) Removable bridges or other hardware      Cardiovascular   cardiovascular exam normal       Rhythm and rate: regular and normal     Pulmonary   pulmonary exam normal        breath sounds clear to auscultation         OUTSIDE LABS:  CBC:   Lab Results   Component Value Date    WBC 5.4 07/20/2023    WBC 6.4 12/01/2021    HGB 15.5 07/20/2023    HGB 16.0 12/01/2021    HCT 44.1 07/20/2023    HCT 45.2 12/01/2021     07/20/2023     12/01/2021     BMP:   Lab Results   Component Value Date     07/20/2023     12/01/2021    POTASSIUM 3.9 07/20/2023    POTASSIUM 3.8 12/01/2021    CHLORIDE 101 07/20/2023    CHLORIDE 103 12/01/2021    CO2 23 07/20/2023    CO2 24 12/01/2021    BUN 7.1 (L) 07/20/2023    BUN 9 12/01/2021    CR 0.69 07/20/2023    CR 0.78 12/01/2021    GLC 84 07/20/2023     (H) 12/01/2021     COAGS: No results found for: PTT, INR, FIBR  POC: No results found for: BGM, HCG, HCGS  HEPATIC:   Lab Results   Component Value Date    ALBUMIN 4.0 07/20/2023    PROTTOTAL 7.1 07/20/2023    ALT 25 07/20/2023    AST 35 07/20/2023     (H) 03/05/2021    ALKPHOS 101 07/20/2023    BILITOTAL 1.0 07/20/2023     OTHER:   Lab Results   Component Value Date    TOBIAS 9.4 07/20/2023    TSH 1.58 07/20/2023    SED 9 12/01/2021       Anesthesia Plan    ASA Status:  2    NPO Status:  NPO Appropriate    Anesthesia Type: MAC.     - Reason for MAC: straight local not clinically adequate              Consents    Anesthesia Plan(s) and associated risks, benefits, and realistic alternatives discussed. Questions answered and patient/representative(s) expressed understanding.     - Discussed:     - Discussed with:  Patient            Postoperative Care            Comments:    Other Comments: Needs [facility] today    See rios    Risks and benefits of MAC anesthetic discussed including dental damage, aspiration, loss of airway, conversion to general  anesthetic, CV complications, MI, stroke, death. Pt wishes to proceed.             JACINTA ROBERTSON CRNA

## 2023-09-28 ENCOUNTER — HOSPITAL ENCOUNTER (OUTPATIENT)
Facility: HOSPITAL | Age: 73
Discharge: HOME | End: 2023-09-28
Attending: SURGERY | Admitting: SURGERY
Payer: COMMERCIAL

## 2023-09-28 ENCOUNTER — ANESTHESIA (OUTPATIENT)
Dept: SURGERY | Facility: HOSPITAL | Age: 73
End: 2023-09-28
Payer: COMMERCIAL

## 2023-09-28 VITALS
OXYGEN SATURATION: 98 % | BODY MASS INDEX: 26.22 KG/M2 | HEIGHT: 69 IN | WEIGHT: 177 LBS | HEART RATE: 69 BPM | DIASTOLIC BLOOD PRESSURE: 96 MMHG | TEMPERATURE: 97.7 F | SYSTOLIC BLOOD PRESSURE: 166 MMHG | RESPIRATION RATE: 18 BRPM

## 2023-09-28 PROCEDURE — 710N000012 HC RECOVERY PHASE 2, PER MINUTE: Performed by: SURGERY

## 2023-09-28 PROCEDURE — 272N000001 HC OR GENERAL SUPPLY STERILE: Performed by: SURGERY

## 2023-09-28 PROCEDURE — 88305 TISSUE EXAM BY PATHOLOGIST: CPT | Mod: 26 | Performed by: PATHOLOGY

## 2023-09-28 PROCEDURE — 258N000003 HC RX IP 258 OP 636: Performed by: NURSE ANESTHETIST, CERTIFIED REGISTERED

## 2023-09-28 PROCEDURE — 250N000011 HC RX IP 250 OP 636: Performed by: NURSE ANESTHETIST, CERTIFIED REGISTERED

## 2023-09-28 PROCEDURE — 99100 ANES PT EXTEME AGE<1 YR&>70: CPT | Performed by: NURSE ANESTHETIST, CERTIFIED REGISTERED

## 2023-09-28 PROCEDURE — 88305 TISSUE EXAM BY PATHOLOGIST: CPT | Mod: TC | Performed by: SURGERY

## 2023-09-28 PROCEDURE — 370N000017 HC ANESTHESIA TECHNICAL FEE, PER MIN: Performed by: SURGERY

## 2023-09-28 PROCEDURE — 45380 COLONOSCOPY AND BIOPSY: CPT | Mod: PT | Performed by: SURGERY

## 2023-09-28 PROCEDURE — 360N000075 HC SURGERY LEVEL 2, PER MIN: Performed by: SURGERY

## 2023-09-28 PROCEDURE — 250N000009 HC RX 250: Performed by: SURGERY

## 2023-09-28 PROCEDURE — 999N000141 HC STATISTIC PRE-PROCEDURE NURSING ASSESSMENT: Performed by: SURGERY

## 2023-09-28 PROCEDURE — 45381 COLONOSCOPY SUBMUCOUS NJX: CPT | Performed by: NURSE ANESTHETIST, CERTIFIED REGISTERED

## 2023-09-28 PROCEDURE — 45381 COLONOSCOPY SUBMUCOUS NJX: CPT | Mod: PT | Performed by: SURGERY

## 2023-09-28 RX ORDER — PROPOFOL 10 MG/ML
INJECTION, EMULSION INTRAVENOUS PRN
Status: DISCONTINUED | OUTPATIENT
Start: 2023-09-28 | End: 2023-09-28

## 2023-09-28 RX ORDER — OXYCODONE HYDROCHLORIDE 5 MG/1
5 TABLET ORAL
Status: DISCONTINUED | OUTPATIENT
Start: 2023-09-28 | End: 2023-09-28 | Stop reason: HOSPADM

## 2023-09-28 RX ORDER — ONDANSETRON 2 MG/ML
4 INJECTION INTRAMUSCULAR; INTRAVENOUS EVERY 30 MIN PRN
Status: DISCONTINUED | OUTPATIENT
Start: 2023-09-28 | End: 2023-09-28 | Stop reason: HOSPADM

## 2023-09-28 RX ORDER — ONDANSETRON 4 MG/1
4 TABLET, ORALLY DISINTEGRATING ORAL EVERY 30 MIN PRN
Status: DISCONTINUED | OUTPATIENT
Start: 2023-09-28 | End: 2023-09-28 | Stop reason: HOSPADM

## 2023-09-28 RX ORDER — LIDOCAINE 40 MG/G
CREAM TOPICAL
Status: DISCONTINUED | OUTPATIENT
Start: 2023-09-28 | End: 2023-09-28 | Stop reason: HOSPADM

## 2023-09-28 RX ORDER — SODIUM CHLORIDE, SODIUM LACTATE, POTASSIUM CHLORIDE, CALCIUM CHLORIDE 600; 310; 30; 20 MG/100ML; MG/100ML; MG/100ML; MG/100ML
INJECTION, SOLUTION INTRAVENOUS CONTINUOUS
Status: DISCONTINUED | OUTPATIENT
Start: 2023-09-28 | End: 2023-09-28 | Stop reason: HOSPADM

## 2023-09-28 RX ADMIN — SODIUM CHLORIDE, POTASSIUM CHLORIDE, SODIUM LACTATE AND CALCIUM CHLORIDE: 600; 310; 30; 20 INJECTION, SOLUTION INTRAVENOUS at 11:30

## 2023-09-28 RX ADMIN — PROPOFOL 20 MG: 10 INJECTION, EMULSION INTRAVENOUS at 14:01

## 2023-09-28 RX ADMIN — PROPOFOL 10 MG: 10 INJECTION, EMULSION INTRAVENOUS at 14:00

## 2023-09-28 RX ADMIN — PROPOFOL 50 MG: 10 INJECTION, EMULSION INTRAVENOUS at 13:55

## 2023-09-28 RX ADMIN — PROPOFOL 20 MG: 10 INJECTION, EMULSION INTRAVENOUS at 14:06

## 2023-09-28 RX ADMIN — PROPOFOL 50 MG: 10 INJECTION, EMULSION INTRAVENOUS at 13:57

## 2023-09-28 RX ADMIN — PROPOFOL 10 MG: 10 INJECTION, EMULSION INTRAVENOUS at 13:59

## 2023-09-28 RX ADMIN — PROPOFOL 20 MG: 10 INJECTION, EMULSION INTRAVENOUS at 14:09

## 2023-09-28 RX ADMIN — PROPOFOL 20 MG: 10 INJECTION, EMULSION INTRAVENOUS at 14:03

## 2023-09-28 RX ADMIN — PROPOFOL 50 MG: 10 INJECTION, EMULSION INTRAVENOUS at 13:58

## 2023-09-28 RX ADMIN — PROPOFOL 20 MG: 10 INJECTION, EMULSION INTRAVENOUS at 14:04

## 2023-09-28 RX ADMIN — PROPOFOL 50 MG: 10 INJECTION, EMULSION INTRAVENOUS at 13:56

## 2023-09-28 RX ADMIN — PROPOFOL 20 MG: 10 INJECTION, EMULSION INTRAVENOUS at 14:05

## 2023-09-28 RX ADMIN — PROPOFOL 20 MG: 10 INJECTION, EMULSION INTRAVENOUS at 14:07

## 2023-09-28 RX ADMIN — PROPOFOL 100 MG: 10 INJECTION, EMULSION INTRAVENOUS at 13:53

## 2023-09-28 RX ADMIN — PROPOFOL 20 MG: 10 INJECTION, EMULSION INTRAVENOUS at 14:02

## 2023-09-28 ASSESSMENT — ACTIVITIES OF DAILY LIVING (ADL)
ADLS_ACUITY_SCORE: 33
ADLS_ACUITY_SCORE: 35
ADLS_ACUITY_SCORE: 35

## 2023-09-28 ASSESSMENT — LIFESTYLE VARIABLES: TOBACCO_USE: 1

## 2023-09-28 NOTE — ANESTHESIA POSTPROCEDURE EVALUATION
Patient: Christopher Valentine    Procedure: Procedure(s):  COLONOSCOPY with polypectomy and tatooing 120cm colon       Anesthesia Type:  MAC    Note:  Disposition: Outpatient   Postop Pain Control: Uneventful            Sign Out: Well controlled pain   PONV: No   Neuro/Psych: Uneventful            Sign Out: Acceptable/Baseline neuro status   Airway/Respiratory: Uneventful            Sign Out: Acceptable/Baseline resp. status   CV/Hemodynamics: Uneventful            Sign Out: Acceptable CV status; No obvious hypovolemia; No obvious fluid overload   Other NRE: NONE   DID A NON-ROUTINE EVENT OCCUR? No       Last vitals:  Vitals Value Taken Time   /96 09/28/23 1500   Temp     Pulse 69 09/28/23 1500   Resp 18 09/28/23 1500   SpO2 98 % 09/28/23 1500       Electronically Signed By: JACINTA Dejesus CRNA  September 28, 2023  3:35 PM

## 2023-09-28 NOTE — ANESTHESIA CARE TRANSFER NOTE
Patient: Christopher Valentine    Procedure: Procedure(s):  COLONOSCOPY with polypectomy and tatooing 120cm colon       Diagnosis: Hx of colonic polyps [Z86.010]  Diagnosis Additional Information: No value filed.    Anesthesia Type:   MAC     Note:    Oropharynx: oropharynx clear of all foreign objects  Level of Consciousness: drowsy  Oxygen Supplementation: room air    Independent Airway: airway patency satisfactory and stable  Dentition: dentition unchanged  Vital Signs Stable: post-procedure vital signs reviewed and stable  Report to RN Given: handoff report given  Patient transferred to: Phase II    Handoff Report: Identifed the Patient, Identified the Reponsible Provider, Reviewed the pertinent medical history, Discussed the surgical course, Reviewed Intra-OP anesthesia mangement and issues during anesthesia, Set expectations for post-procedure period and Allowed opportunity for questions and acknowledgement of understanding  Vitals:  Vitals Value Taken Time   BP     Temp     Pulse     Resp     SpO2         Electronically Signed By: JACINTA Hobbs CRNA  September 28, 2023  2:16 PM

## 2023-09-28 NOTE — OR NURSING
Patient and responsible adult given discharge instructions with no questions regarding instructions. Felicitas score 20. Pain level 0/10.  Discharged from unit via ambulatory. Patient discharged to home.

## 2023-09-28 NOTE — DISCHARGE INSTRUCTIONS

## 2023-09-28 NOTE — H&P
HISTORY AND PHYSICAL - ENDOSCOPY   9/28/2023    Patient : Christopher Valentine    Planned Procedures : Colonoscopy    This is a 73 year old male with a need for a colonoscopy for History of Colon Polyps and Family History of Colon Cancer (father).      Last colonoscopy : 2021  Family history of colon cancer : YES  Family history of colon polyps : NO  Personal history of colon cancer : NO  Personal history of colon polyps : YES  Rectal bleeding : NO  Changes in bowel habits :NO  Personal history of inflammatory bowel disease : NO    Past Medical History:  Past Medical History:   Diagnosis Date    Alcohol Abuse 08/02/2011    Dyslipidemia 08/16/2011       Past Surgical History:  Past Surgical History:   Procedure Laterality Date    ANKLE SURGERY  2000    LT, hardware removal and ankle fusion; traumatic arthritis    ARTHROPLASTY KNEE Right 12/15/2021    Procedure: RIGHT TOTAL KNEE ARTHROPLASTY;  Surgeon: Cruz Vasquez MD;  Location: [facility]    COLONOSCOPY  2011    with polypectomy    COLONOSCOPY N/A 4/15/2021    Procedure: Colonoscopy, with  polypectomy;  Surgeon: Sam Smith MD;  Location: [facility]       Family History History:  Family History   Problem Relation Age of Onset    Cancer - colorectal Father         (cause of death)     Cancer Father     Alzheimer Disease Mother        History of Tobacco Use:  History   Smoking Status    Light Smoker    Types: Cigarettes   Smokeless Tobacco    Never       Current Medications:  No current outpatient medications on file.       Allergies:  No Known Allergies    ROS:  Constitutional: negative  Eyes: negative  Ears, nose, mouth, throat, and face: negative  Respiratory: negative  Cardiovascular: negative  Gastrointestinal: positive for history of colon polyps, family history of colon cancer  Genitourinary:negative  Integument/breast: negative  Hematologic/lymphatic: negative  Musculoskeletal: negative  Neurological: negative  Behavioral/Psych: positive for daily alcohol  "use  Endocrine: negative  Allergic/Immunologic: negative    PHYSICAL EXAM:     Vital signs: /86   Temp 97.7  F (36.5  C) (Tympanic)   Resp 18   Ht 1.74 m (5' 8.5\")   Wt 80.3 kg (177 lb)   SpO2 98%   BMI 26.52 kg/m     BMI: Body mass index is 26.52 kg/m .   General: Normal, healthy, cooperative, in no acute distress, alert   Skin: no rashes   Lungs: clear to auscultation   CV: Regular rate and rhythm   Abdominal: non-distended, soft, non-tender to palpation   Extremities: No cyanosis, clubbing or edema noted bilaterally in Upper and Lower Extremities   Neurological: without deficit    Assessment:   73 year old male with need of a colonoscopy for History of Colon Polyps and Family History of Colon Cancer (father):    Plan:   Will proceed with doing a colonoscopy.      The risks, benefits, and alternatives to the planned procedure were fully discussed with the patient and/or the patient's representative(s). The risks of bleeding, infection, death, missing pathology, the need for additional procedures intra-operatively, the possible need for intra-operative consults, the possible need for transfusion therapy, cardiopulmonary compromise, the possible need for additional surgery for a complication were discussed with the patient and/or the patient's representative(s). The patient's and/or patient's representative(s) questions were addressed and answered. Informed consent was obtained from the patient and/or the patient's representative(s). The patient and/or the patient's representative(s) consent to proceed.    Specific risks:  Risks include but are not limited to bleeding, perforation, missing lesions, need for additional procedures, reaction to anesthesia.  All the patients questions were answered.  The patient consents to proceed.  The procedures will be scheduled.      "

## 2023-09-28 NOTE — OP NOTE
REPORT OF OPERATION  DATE OF PROCEDURE: 9/28/2023    PATIENT: Christopher Valentine    SURGERY PERFORMED:   Colonoscopy     with biopsy    without use of cauterized snare    with tattooing    PREOPERATIVE DIAGNOSIS: Screening colonoscopy and History of Colon Polyps    POSTOPERATIVE DIAGNOSIS:    Same   Large polyps in the ascending colon and 120 cm.  Small polyps at 90 and 60 cm.   Diverticulosis was identified.   Hemorrhoids  were  identified.    SURGEON: Porfirio Matthew MD    ASSISTANTS: None    ANESTHESIA: Monitored Anesthesia Care    COMPLICATIONS: None apparent    TRANSFUSIONS: None     TISSUE TO PATHOLOGY: Polyps from  C  were sent to pathology for pathological diagnosis.    FINDINGS:  Large polyps which were not resected at the a sending colon and 120 cm.  Smaller polyps which were resected at 90 cm and 60 cm. .  Diverticulosis was identified.  Hemorrhoids  were  identified.    INDICATIONS: This is a 73 year old male in need of a colonoscopy for Screening colonoscopy, History of Colon Polyps, and Family History of Colon Cancer.  The patient will be taken to the endoscopy suite for that procedure.    DESCRIPTIONS OF PROCEDURE IN DETAIL: After consent was obtained the patient was taken to the endoscopy suite and placed in the left lateral decubitus position.  The patient was identified and the correct patient was confirmed.  Monitored Anesthesia Care was given.  A time out was performed verifying the correct patient and the correct procedure.  The entire operative team was in agreement.  All necessary equipment and supplies were in the room.    Rectal exam was performed and no lesions of the anal canal were noted.  The colonoscope was inserted into the anus and passed without difficulty to the cecum.  The cecum was identified by the ileocecal valve, the coalescence of the tinea and the appendiceal orifice.  Upon withdrawal all walls of the colon were visualized.  Large polyps were identified in the ascending  colon and at 120 cm.  These were not removed.  Biopsies were taken.  Smaller polyps at 90 and 60 cm.  These were removed using the cold biopsy forceps.  Occasion of the 120 cm polyp was tattooed.   Large colon masses and colitis were not seen.colon  Diverticulosis was seen.  Upon reaching the rectum the scope was retroflexed and internal hemorrhoids  were  seen.  The scope was straightened back out and removed from the patient.  The patient was then taken to the recovery room in stable condition tolerating the procedure well.      Prep: fair    Withdrawal time was 154 minutes.    It is recommended that the patient have another colonoscopy in 1 years.

## 2023-09-28 NOTE — OR NURSING
Anesthesia notified of elevated BP. BP up after MD told patient he would need surgery to remove his polyps. Instructed patient to monitor BP and see primary care provider if remains elevated. Patient requesting to go home. Agrees with plan.

## 2023-10-04 LAB
PATH REPORT.COMMENTS IMP SPEC: NORMAL
PATH REPORT.FINAL DX SPEC: NORMAL
PATH REPORT.GROSS SPEC: NORMAL
PATH REPORT.MICROSCOPIC SPEC OTHER STN: NORMAL
PATH REPORT.RELEVANT HX SPEC: NORMAL
PHOTO IMAGE: NORMAL

## 2023-10-10 ENCOUNTER — PREP FOR PROCEDURE (OUTPATIENT)
Dept: SURGERY | Facility: OTHER | Age: 73
End: 2023-10-10

## 2023-10-10 ENCOUNTER — OFFICE VISIT (OUTPATIENT)
Dept: SURGERY | Facility: OTHER | Age: 73
End: 2023-10-10
Attending: SURGERY
Payer: COMMERCIAL

## 2023-10-10 VITALS
DIASTOLIC BLOOD PRESSURE: 74 MMHG | HEIGHT: 69 IN | WEIGHT: 177 LBS | RESPIRATION RATE: 14 BRPM | HEART RATE: 66 BPM | SYSTOLIC BLOOD PRESSURE: 152 MMHG | TEMPERATURE: 97.1 F | BODY MASS INDEX: 26.22 KG/M2 | OXYGEN SATURATION: 98 %

## 2023-10-10 DIAGNOSIS — K63.5 POLYP OF LARGE INTESTINE: Primary | ICD-10-CM

## 2023-10-10 DIAGNOSIS — K63.5 POLYP OF CECUM: Primary | ICD-10-CM

## 2023-10-10 PROCEDURE — G0463 HOSPITAL OUTPT CLINIC VISIT: HCPCS

## 2023-10-10 PROCEDURE — 99213 OFFICE O/P EST LOW 20 MIN: CPT | Performed by: SURGERY

## 2023-10-10 RX ORDER — METRONIDAZOLE 500 MG/1
TABLET ORAL
Qty: 6 TABLET | Refills: 0 | Status: ON HOLD | OUTPATIENT
Start: 2023-10-10 | End: 2023-11-16

## 2023-10-10 RX ORDER — BISACODYL 5 MG/1
5 TABLET, DELAYED RELEASE ORAL DAILY PRN
Qty: 4 TABLET | Refills: 0 | Status: ON HOLD | OUTPATIENT
Start: 2023-10-10 | End: 2023-11-16

## 2023-10-10 RX ORDER — NEOMYCIN SULFATE 500 MG/1
TABLET ORAL
Qty: 6 TABLET | Refills: 0 | Status: ON HOLD | OUTPATIENT
Start: 2023-10-10 | End: 2023-11-16

## 2023-10-10 ASSESSMENT — PAIN SCALES - GENERAL: PAINLEVEL: NO PAIN (0)

## 2023-10-10 NOTE — PROGRESS NOTES
"CLINIC NOTE - POST-OP ENDOSCOPY  10/10/2023    Patient:Christopher Valentine    Procedure: Colonoscopy with biopsies    This is a 73 year old male who is 2 weeks s/p Colonoscopy with biopsies.  At the time of endoscopy large nonresectable polyp was noted in the ascending colon.  Biopsies were taken.  Pathology returned as benign.     Current Medications:  No current outpatient medications on file.       Allergies:  No Known Allergies    PHYSICAL EXAM:   Vital signs: BP (!) 152/74 (BP Location: Right arm, Cuff Size: Adult Regular)   Pulse 66   Temp 97.1  F (36.2  C) (Tympanic)   Resp 14   Ht 1.74 m (5' 8.5\")   Wt 80.3 kg (177 lb)   SpO2 98%   BMI 26.52 kg/m     Weight: [unfilled]   BMI: Body mass index is 26.52 kg/m .   General: Normal, healthy, cooperative, in no acute distress, alert   Lungs: clear to auscultation   CV: Regular rate and rhythm without murmer   Abdominal: abdomen is soft without significant tenderness, masses, organomegaly or guarding       PATHOLOGY:  Case Report   Date Value Ref Range Status   09/28/2023   Final    Surgical Pathology Report                         Case: [id number]                                  Authorizing Provider:  Porfirio Matthew MD  Collected:           09/28/2023 02:02 PM          Ordering Location:     [facility] Main Operating Room     Received:            09/29/2023 12:43 PM          Pathologist:           Kurt Fuller DO                                                         Specimens:   A) - Large Intestine, Colon, Ascending, Polyp Ascending Colon                                       B) - Large Intestine, Colon, polyp 120cm colon                                                      C) - Large Intestine, Colon, polyp 90cm Colon                                                       D) - Large Intestine, Colon, polyp 60cm Colon                                               Final Diagnosis   Date Value Ref Range Status   09/28/2023   Final    A.  Ascending colon, " polyp, polypectomy:  -Tubular adenoma.    B.  Colon, polyp at 120 cm, polypectomy:  -Tubular adenoma.    C.  Colon, polyp at 90 cm, polypectomy:  -Tubular adenoma.    D.  Colon, polyp at 60 cm, polypectomy:  -Tubular adenoma.         ASSESSMENT:    73 year old male who is 2 weeks s/p Esophagogastroduodenoscopy with biopsy.  Does have benign polyp in the ascending colon that is not able to be resected via endoscopy.    PLAN:   Dust options with him and we will plan on doing a right hemicolectomy.    The risks, benefits, and alternatives to the planned procedure were fully discussed with the patient and/or the patient's representative(s). The risks of bleeding, infection, death, missing pathology, the need for additional procedures intra-operatively, the possible need for intra-operative consults, the possible need for transfusion therapy, cardiopulmonary compromise, the possible need for additional surgery for a complication were discussed with the patient and/or the patient's representative(s). The patient's and/or patient's representative(s) questions were addressed and answered. Informed consent was obtained from the patient and/or the patient's representative(s). The patient and/or the patient's representative(s) consent to proceed.

## 2023-11-07 NOTE — PROGRESS NOTES
[facility] [place]  [address]  [facility] [place] [address]  Phone: [phone]  Primary Provider: Di Orr  Pre-op Performing Provider: LYLE HALE      PREOPERATIVE EVALUATION:  Today's date: 11/9/2023    Christopher is a 73 year old male who presents for a preoperative evaluation.      11/9/2023     9:50 AM   Additional Questions   Roomed by Key Tran         11/9/2023     9:50 AM   Patient Reported Additional Medications   Patient reports taking the following new medications none       Surgical Information:  Surgery/Procedure: Hemicolectomy  [place] [facility] [place]  Surgeon: Dr.W Matthew  Surgery Date: 11/16  Time of Surgery: TBD  Where patient plans to recover: At home with family  Fax number for surgical facility: Note does not need to be faxed, will be available electronically in Epic.    Assessment & Plan     The proposed surgical procedure is considered INTERMEDIATE risk.    Preoperative examination  - CBC with Platelets & Differential; Future  - Comprehensive metabolic panel; Future  - EKG 12-lead complete w/read - Clinics    Polyp of colon, unspecified part of colon, unspecified type    Uncomplicated alcohol dependence (H)  - Reflex INR; Future    Cigarette nicotine dependence without complication    Essential hypertension    [place] cardiac risk >20% in next 10 years        - No identified additional risk factors other than previously addressed    Antiplatelet or Anticoagulation Medication Instructions:   - Patient is on no antiplatelet or anticoagulation medications.    Additional Medication Instructions:  Patient is on no additional chronic medications    RECOMMENDATION:  APPROVAL GIVEN to proceed with proposed procedure, without further diagnostic evaluation.          Subjective       HPI related to upcoming procedure: preop hemicolectomy          11/9/2023     9:55 AM   Preop Questions   1. Have you ever had a heart attack or stroke? No   2. Have you ever had surgery on your heart  or blood vessels, such as a stent placement, a coronary artery bypass, or surgery on an artery in your head, neck, heart, or legs? No   3. Do you have chest pain with activity? No   4. Do you have a history of  heart failure? No   5. Do you currently have a cold, bronchitis or symptoms of other infection? No   6. Do you have a cough, shortness of breath, or wheezing? No   7. Do you or anyone in your family have previous history of blood clots? No   8. Do you or does anyone in your family have a serious bleeding problem such as prolonged bleeding following surgeries or cuts? No   9. Have you ever had problems with anemia or been told to take iron pills? No   10. Have you had any abnormal blood loss such as black, tarry or bloody stools? No   11. Have you ever had a blood transfusion? No   12. Are you willing to have a blood transfusion if it is medically needed before, during, or after your surgery? Yes   13. Have you or any of your relatives ever had problems with anesthesia? No   14. Do you have sleep apnea, excessive snoring or daytime drowsiness? No   15. Do you have any artifical heart valves or other implanted medical devices like a pacemaker, defibrillator, or continuous glucose monitor? No   16. Do you have artificial joints? YES - right knee.  Left ankle fusion.   17. Are you allergic to latex? No     Health Care Directive:  Patient does not have a Health Care Directive or Living Will: Discussed advance care planning with patient; however, patient declined at this time.    Preoperative Review of :   reviewed - no record of controlled substances prescribed.      Status of Chronic Conditions:  Drinks 10-12 cans beer daily.  Denies time without alcohol in the past year or so.    Smokes, currently a few cigs per day (2 yesterday), former 1 ppd smoker, started approx age 17  States he's never been on any inhalers, denies dyspnea issues.  Very mild emphysema seen on CT chest 3/29/22  Coronary calcification,  "seen on CT chest 3/29/22  Not on statin - patient states used to be on cholesterol meds but stopped because they made him \"screwy\".  Hypertension - untreated - but acceptable for surgery    The 10-year ASCVD risk score (Mariana THAKUR, et al., 2019) is: 27.2%    Values used to calculate the score:      Age: 73 years      Sex: Male      Is Non- : No      Diabetic: No      Tobacco smoker: Yes      Systolic Blood Pressure: 145 mmHg      Is BP treated: No      HDL Cholesterol: 61 mg/dL      Total Cholesterol: 210 mg/dL      Review of Systems   Constitutional:  Negative for fever.   Respiratory:  Negative for shortness of breath.    Cardiovascular:  Negative for chest pain, palpitations and peripheral edema (mild chronic ankle swelling where he has his surgery).   Gastrointestinal:  Negative for abdominal pain.   Neurological:  Negative for light-headedness and headaches.         Patient Active Problem List    Diagnosis Date Noted    Special screening for malignant neoplasms, colon 03/05/2021     Priority: Medium    Uncomplicated alcohol dependence (H) 03/05/2021     Priority: Medium    Tobacco use disorder 03/05/2021     Priority: Medium    High cholesterol 03/05/2021     Priority: Medium      Past Medical History:   Diagnosis Date    Alcohol Abuse 08/02/2011    Dyslipidemia 08/16/2011     Past Surgical History:   Procedure Laterality Date    ANKLE SURGERY  2000    LT, hardware removal and ankle fusion; traumatic arthritis    ARTHROPLASTY KNEE Right 12/15/2021    Procedure: RIGHT TOTAL KNEE ARTHROPLASTY;  Surgeon: Cruz Vasquez MD;  Location: [facility]    COLONOSCOPY  2011    with polypectomy    COLONOSCOPY N/A 4/15/2021    Procedure: Colonoscopy, with  polypectomy;  Surgeon: Sam Smith MD;  Location: [facility]    COLONOSCOPY N/A 9/28/2023    Procedure: COLONOSCOPY with polypectomy and tatooing 120cm colon;  Surgeon: Porfirio Matthew MD;  Location: [facility]     Current Outpatient Medications "   Medication Sig Dispense Refill    bisacodyl (DULCOLAX) 5 MG EC tablet Take 1 tablet (5 mg) by mouth daily as needed for constipation Take 2 tablets by mouth 2 days prior to procedure. Take the remaining 2 tablets by mouth at 3pm the day prior to procedure. (Patient not taking: Reported on 11/9/2023) 4 tablet 0    metroNIDAZOLE (FLAGYL) 500 MG tablet Take 2 tablets at 1400, take 2 tablets at 1500 and take 2 tablets at 2200 on day prior to surgery (Patient not taking: Reported on 11/9/2023) 6 tablet 0    neomycin (MYCIFRADIN) 500 MG tablet Take 2 tablets at 1400, take 2 tablets at 1500 take 2 tablets at 2200 on day prior to surgery (Patient not taking: Reported on 11/9/2023) 6 tablet 0       No Known Allergies     Social History     Tobacco Use    Smoking status: Light Smoker     Types: Cigarettes    Smokeless tobacco: Never   Substance Use Topics    Alcohol use: Yes     Comment: beer daily        History   Drug Use Unknown         Objective     BP (!) 145/81 (BP Location: Left arm, Patient Position: Sitting, Cuff Size: Adult Regular)   Pulse 77   Temp 97.4  F (36.3  C) (Tympanic)   Wt 82.3 kg (181 lb 8 oz)   SpO2 96%   BMI 27.20 kg/m      Physical Exam  Constitutional:       General: He is not in acute distress.     Appearance: Normal appearance.   HENT:      Head: Normocephalic and atraumatic.      Right Ear: There is impacted cerumen.      Left Ear: There is impacted cerumen.      Mouth/Throat:      Mouth: Mucous membranes are moist.      Pharynx: Oropharynx is clear.   Eyes:      Conjunctiva/sclera: Conjunctivae normal.      Pupils: Pupils are equal, round, and reactive to light.   Neck:      Vascular: No carotid bruit.   Cardiovascular:      Rate and Rhythm: Normal rate and regular rhythm.      Heart sounds: Normal heart sounds. No murmur heard.  Pulmonary:      Effort: Pulmonary effort is normal.      Breath sounds: Normal breath sounds. No wheezing, rhonchi or rales.   Abdominal:      General: Bowel  sounds are normal.      Palpations: Abdomen is soft.      Tenderness: There is no abdominal tenderness.   Musculoskeletal:      Right lower leg: No edema.      Left lower leg: No edema.      Comments: Limited c-spine extension   Lymphadenopathy:      Cervical: No cervical adenopathy.   Neurological:      Mental Status: He is alert and oriented to person, place, and time.           Recent Labs   Lab Test 07/20/23  0822 12/01/21  1404   HGB 15.5 16.0    213    134   POTASSIUM 3.9 3.8   CR 0.69 0.78        Diagnostics:  Recent Results (from the past 168 hour(s))   EKG 12-lead complete w/read - Clinics    Collection Time: 11/09/23 10:45 AM   Result Value Ref Range    Systolic Blood Pressure  mmHg    Diastolic Blood Pressure  mmHg    Ventricular Rate 69 BPM    Atrial Rate 69 BPM    CO Interval 174 ms    QRS Duration 80 ms     ms    QTc 435 ms    P Axis 70 degrees    R AXIS 8 degrees    T Axis 60 degrees    Interpretation ECG       Sinus rhythm  Normal ECG  No previous ECGs available  Confirmed by MD Salas, [facility] [place] ([id number]) on 11/12/2023 2:00:04 PM     Comprehensive metabolic panel    Collection Time: 11/09/23 11:07 AM   Result Value Ref Range    Sodium 136 135 - 145 mmol/L    Potassium 4.0 3.4 - 5.3 mmol/L    Carbon Dioxide (CO2) 23 22 - 29 mmol/L    Anion Gap 11 7 - 15 mmol/L    Urea Nitrogen 7.6 (L) 8.0 - 23.0 mg/dL    Creatinine 0.68 0.67 - 1.17 mg/dL    GFR Estimate >90 >60 mL/min/1.73m2    Calcium 9.2 8.8 - 10.2 mg/dL    Chloride 102 98 - 107 mmol/L    Glucose 88 70 - 99 mg/dL    Alkaline Phosphatase 92 40 - 129 U/L    AST 29 0 - 45 U/L    ALT 24 0 - 70 U/L    Protein Total 7.3 6.4 - 8.3 g/dL    Albumin 4.2 3.5 - 5.2 g/dL    Bilirubin Total 1.1 <=1.2 mg/dL   Reflex INR    Collection Time: 11/09/23 11:07 AM   Result Value Ref Range    INR 1.02 0.85 - 1.15   CBC with platelets and differential    Collection Time: 11/09/23 11:07 AM   Result Value Ref Range    WBC Count 6.0 4.0 - 11.0 10e3/uL    RBC  Count 4.79 4.40 - 5.90 10e6/uL    Hemoglobin 16.1 13.3 - 17.7 g/dL    Hematocrit 44.7 40.0 - 53.0 %    MCV 93 78 - 100 fL    MCH 33.6 (H) 26.5 - 33.0 pg    MCHC 36.0 31.5 - 36.5 g/dL    RDW 11.9 10.0 - 15.0 %    Platelet Count 235 150 - 450 10e3/uL    % Neutrophils 44 %    % Lymphocytes 43 %    % Monocytes 9 %    % Eosinophils 3 %    % Basophils 1 %    % Immature Granulocytes 0 %    NRBCs per 100 WBC 0 <1 /100    Absolute Neutrophils 2.7 1.6 - 8.3 10e3/uL    Absolute Lymphocytes 2.6 0.8 - 5.3 10e3/uL    Absolute Monocytes 0.5 0.0 - 1.3 10e3/uL    Absolute Eosinophils 0.2 0.0 - 0.7 10e3/uL    Absolute Basophils 0.0 0.0 - 0.2 10e3/uL    Absolute Immature Granulocytes 0.0 <=0.4 10e3/uL    Absolute NRBCs 0.0 10e3/uL        EKG:  NSR rate 69 .           Signed Electronically by: LYLE HALE DO  Copy of this evaluation report is provided to requesting physician.

## 2023-11-09 ENCOUNTER — OFFICE VISIT (OUTPATIENT)
Dept: FAMILY MEDICINE | Facility: OTHER | Age: 73
End: 2023-11-09
Attending: FAMILY MEDICINE
Payer: COMMERCIAL

## 2023-11-09 VITALS
SYSTOLIC BLOOD PRESSURE: 145 MMHG | BODY MASS INDEX: 27.2 KG/M2 | DIASTOLIC BLOOD PRESSURE: 81 MMHG | WEIGHT: 181.5 LBS | HEART RATE: 77 BPM | TEMPERATURE: 97.4 F | OXYGEN SATURATION: 96 %

## 2023-11-09 DIAGNOSIS — F17.210 CIGARETTE NICOTINE DEPENDENCE WITHOUT COMPLICATION: ICD-10-CM

## 2023-11-09 DIAGNOSIS — Z91.89 FRAMINGHAM CARDIAC RISK >20% IN NEXT 10 YEARS: ICD-10-CM

## 2023-11-09 DIAGNOSIS — Z01.818 PREOPERATIVE EXAMINATION: Primary | ICD-10-CM

## 2023-11-09 DIAGNOSIS — I10 ESSENTIAL HYPERTENSION: ICD-10-CM

## 2023-11-09 DIAGNOSIS — F10.20 UNCOMPLICATED ALCOHOL DEPENDENCE (H): ICD-10-CM

## 2023-11-09 DIAGNOSIS — K63.5 POLYP OF COLON, UNSPECIFIED PART OF COLON, UNSPECIFIED TYPE: ICD-10-CM

## 2023-11-09 LAB
ALBUMIN SERPL BCG-MCNC: 4.2 G/DL (ref 3.5–5.2)
ALP SERPL-CCNC: 92 U/L (ref 40–129)
ALT SERPL W P-5'-P-CCNC: 24 U/L (ref 0–70)
ANION GAP SERPL CALCULATED.3IONS-SCNC: 11 MMOL/L (ref 7–15)
AST SERPL W P-5'-P-CCNC: 29 U/L (ref 0–45)
BASOPHILS # BLD AUTO: 0 10E3/UL (ref 0–0.2)
BASOPHILS NFR BLD AUTO: 1 %
BILIRUB SERPL-MCNC: 1.1 MG/DL
BUN SERPL-MCNC: 7.6 MG/DL (ref 8–23)
CALCIUM SERPL-MCNC: 9.2 MG/DL (ref 8.8–10.2)
CHLORIDE SERPL-SCNC: 102 MMOL/L (ref 98–107)
CREAT SERPL-MCNC: 0.68 MG/DL (ref 0.67–1.17)
DEPRECATED HCO3 PLAS-SCNC: 23 MMOL/L (ref 22–29)
EGFRCR SERPLBLD CKD-EPI 2021: >90 ML/MIN/1.73M2
EOSINOPHIL # BLD AUTO: 0.2 10E3/UL (ref 0–0.7)
EOSINOPHIL NFR BLD AUTO: 3 %
ERYTHROCYTE [DISTWIDTH] IN BLOOD BY AUTOMATED COUNT: 11.9 % (ref 10–15)
GLUCOSE SERPL-MCNC: 88 MG/DL (ref 70–99)
HCT VFR BLD AUTO: 44.7 % (ref 40–53)
HGB BLD-MCNC: 16.1 G/DL (ref 13.3–17.7)
IMM GRANULOCYTES # BLD: 0 10E3/UL
IMM GRANULOCYTES NFR BLD: 0 %
INR PPP: 1.02 (ref 0.85–1.15)
LYMPHOCYTES # BLD AUTO: 2.6 10E3/UL (ref 0.8–5.3)
LYMPHOCYTES NFR BLD AUTO: 43 %
MCH RBC QN AUTO: 33.6 PG (ref 26.5–33)
MCHC RBC AUTO-ENTMCNC: 36 G/DL (ref 31.5–36.5)
MCV RBC AUTO: 93 FL (ref 78–100)
MONOCYTES # BLD AUTO: 0.5 10E3/UL (ref 0–1.3)
MONOCYTES NFR BLD AUTO: 9 %
NEUTROPHILS # BLD AUTO: 2.7 10E3/UL (ref 1.6–8.3)
NEUTROPHILS NFR BLD AUTO: 44 %
NRBC # BLD AUTO: 0 10E3/UL
NRBC BLD AUTO-RTO: 0 /100
PLATELET # BLD AUTO: 235 10E3/UL (ref 150–450)
POTASSIUM SERPL-SCNC: 4 MMOL/L (ref 3.4–5.3)
PROT SERPL-MCNC: 7.3 G/DL (ref 6.4–8.3)
RBC # BLD AUTO: 4.79 10E6/UL (ref 4.4–5.9)
SODIUM SERPL-SCNC: 136 MMOL/L (ref 135–145)
WBC # BLD AUTO: 6 10E3/UL (ref 4–11)

## 2023-11-09 PROCEDURE — 99214 OFFICE O/P EST MOD 30 MIN: CPT | Performed by: FAMILY MEDICINE

## 2023-11-09 PROCEDURE — G0463 HOSPITAL OUTPT CLINIC VISIT: HCPCS

## 2023-11-09 PROCEDURE — 93010 ELECTROCARDIOGRAM REPORT: CPT | Mod: 77 | Performed by: INTERNAL MEDICINE

## 2023-11-09 PROCEDURE — 85025 COMPLETE CBC W/AUTO DIFF WBC: CPT | Mod: ZL | Performed by: FAMILY MEDICINE

## 2023-11-09 PROCEDURE — 80053 COMPREHEN METABOLIC PANEL: CPT | Mod: ZL | Performed by: FAMILY MEDICINE

## 2023-11-09 PROCEDURE — 85610 PROTHROMBIN TIME: CPT | Mod: ZL | Performed by: FAMILY MEDICINE

## 2023-11-09 PROCEDURE — 36415 COLL VENOUS BLD VENIPUNCTURE: CPT | Mod: ZL | Performed by: FAMILY MEDICINE

## 2023-11-09 PROCEDURE — G0463 HOSPITAL OUTPT CLINIC VISIT: HCPCS | Mod: 25

## 2023-11-09 PROCEDURE — 93005 ELECTROCARDIOGRAM TRACING: CPT | Performed by: FAMILY MEDICINE

## 2023-11-09 ASSESSMENT — ENCOUNTER SYMPTOMS
ABDOMINAL PAIN: 0
FEVER: 0
HEADACHES: 0
SHORTNESS OF BREATH: 0
LIGHT-HEADEDNESS: 0
PALPITATIONS: 0

## 2023-11-12 LAB
ATRIAL RATE - MUSE: 69 BPM
DIASTOLIC BLOOD PRESSURE - MUSE: NORMAL MMHG
INTERPRETATION ECG - MUSE: NORMAL
P AXIS - MUSE: 70 DEGREES
PR INTERVAL - MUSE: 174 MS
QRS DURATION - MUSE: 80 MS
QT - MUSE: 406 MS
QTC - MUSE: 435 MS
R AXIS - MUSE: 8 DEGREES
SYSTOLIC BLOOD PRESSURE - MUSE: NORMAL MMHG
T AXIS - MUSE: 60 DEGREES
VENTRICULAR RATE- MUSE: 69 BPM

## 2023-11-13 ENCOUNTER — ANESTHESIA EVENT (OUTPATIENT)
Dept: SURGERY | Facility: HOSPITAL | Age: 73
DRG: 331 | End: 2023-11-13
Payer: COMMERCIAL

## 2023-11-13 RX ORDER — ONDANSETRON 2 MG/ML
4 INJECTION INTRAMUSCULAR; INTRAVENOUS EVERY 30 MIN PRN
Status: CANCELLED | OUTPATIENT
Start: 2023-11-13

## 2023-11-13 RX ORDER — ONDANSETRON 4 MG/1
4 TABLET, ORALLY DISINTEGRATING ORAL EVERY 30 MIN PRN
Status: CANCELLED | OUTPATIENT
Start: 2023-11-13

## 2023-11-13 RX ORDER — FENTANYL CITRATE 50 UG/ML
25 INJECTION, SOLUTION INTRAMUSCULAR; INTRAVENOUS
Status: CANCELLED | OUTPATIENT
Start: 2023-11-13

## 2023-11-13 ASSESSMENT — LIFESTYLE VARIABLES: TOBACCO_USE: 1

## 2023-11-13 NOTE — ANESTHESIA PREPROCEDURE EVALUATION
Anesthesia Pre-Procedure Evaluation    Patient: Christopher Valentine   MRN: [id number] : 1950        Procedure : Procedure(s):  hand-assisted laparoscopic right hemicolectomy          Past Medical History:   Diagnosis Date     Alcohol Abuse 2011     Dyslipidemia 2011      Past Surgical History:   Procedure Laterality Date     ANKLE SURGERY      LT, hardware removal and ankle fusion; traumatic arthritis     ARTHROPLASTY KNEE Right 12/15/2021    Procedure: RIGHT TOTAL KNEE ARTHROPLASTY;  Surgeon: Cruz Vasquez MD;  [facility]: [facility]     COLONOSCOPY      with polypectomy     COLONOSCOPY N/A 4/15/2021    Procedure: Colonoscopy, with  polypectomy;  Surgeon: Sam Smith MD;  Location: [facility]     COLONOSCOPY N/A 2023    Procedure: COLONOSCOPY with polypectomy and tatooing 120cm colon;  Surgeon: Porfirio Matthew MD;  Location: [facility]      No Known Allergies   Social History     Tobacco Use     Smoking status: Light Smoker     Types: Cigarettes     Start date:      Smokeless tobacco: Never   Substance Use Topics     Alcohol use: Yes     Alcohol/week: 11.0 standard drinks of alcohol     Types: 11 Cans of beer per week     Comment: beer daily       Wt Readings from Last 1 Encounters:   23 82.3 kg (181 lb 8 oz)        Anesthesia Evaluation   Pt has had prior anesthetic. Type: MAC and General.    No history of anesthetic complications       ROS/MED HX  ENT/Pulmonary: Comment: Chest CT 3/29/22 shows mild emphysema, coronary calcification. Never been on inhalers and has no complaints of dyspnea (during preop)    PFT's 3/16/21: Minimal airway obstruction suggesting small airway disease.     (+)                tobacco use, Current use,                      Neurologic: Comment: Limited c-spine extension      Cardiovascular:     (+) Dyslipidemia hypertension- -   -  - -                                 Previous cardiac testing   Echo: Date: Results:    Stress Test:  Date:  "Results:    ECG Reviewed:  Date: preop Results:  NSR rate 69 .  Cath:  Date: Results:      METS/Exercise Tolerance:     Hematologic:  - neg hematologic  ROS     Musculoskeletal: Comment: Right knee artificial joint  Left ankle fusion    Limited c-spine extension noted during pre-op exam.  - neg musculoskeletal ROS     GI/Hepatic:     (+)        bowel prep,            Renal/Genitourinary:  - neg Renal ROS     Endo:  - neg endo ROS     Psychiatric/Substance Use:     (+)   alcohol abuse (10-12 cans of beer/day)      Infectious Disease:  - neg infectious disease ROS     Malignancy:  - neg malignancy ROS     Other:  - neg other ROS          Physical Exam    Airway        Mallampati: III   TM distance: > 3 FB   Neck ROM: limited   Mouth opening: > 3 cm    Respiratory Devices and Support         Dental       (+) Edentulous      Cardiovascular   cardiovascular exam normal       Rhythm and rate: regular     Pulmonary   pulmonary exam normal        breath sounds clear to auscultation       OUTSIDE LABS:  CBC:   Lab Results   Component Value Date    WBC 6.0 11/09/2023    WBC 5.4 07/20/2023    HGB 16.1 11/09/2023    HGB 15.5 07/20/2023    HCT 44.7 11/09/2023    HCT 44.1 07/20/2023     11/09/2023     07/20/2023     BMP:   Lab Results   Component Value Date     11/09/2023     07/20/2023    POTASSIUM 4.0 11/09/2023    POTASSIUM 3.9 07/20/2023    CHLORIDE 102 11/09/2023    CHLORIDE 101 07/20/2023    CO2 23 11/09/2023    CO2 23 07/20/2023    BUN 7.6 (L) 11/09/2023    BUN 7.1 (L) 07/20/2023    CR 0.68 11/09/2023    CR 0.69 07/20/2023    GLC 88 11/09/2023    GLC 84 07/20/2023     COAGS:   Lab Results   Component Value Date    INR 1.02 11/09/2023     POC: No results found for: \"BGM\", \"HCG\", \"HCGS\"  HEPATIC:   Lab Results   Component Value Date    ALBUMIN 4.2 11/09/2023    PROTTOTAL 7.3 11/09/2023    ALT 24 11/09/2023    AST 29 11/09/2023     (H) 03/05/2021    ALKPHOS 92 11/09/2023    BILITOTAL " 1.1 11/09/2023     OTHER:   Lab Results   Component Value Date    TOBIAS 9.2 11/09/2023    TSH 1.58 07/20/2023    SED 9 12/01/2021       Anesthesia Plan    ASA Status:  2    NPO Status:  NPO Appropriate    Anesthesia Type: General.     - Airway: ETT   Induction: Intravenous.   Maintenance: Balanced.        Consents    Anesthesia Plan(s) and associated risks, benefits, and realistic alternatives discussed. Questions answered and patient/representative(s) expressed understanding.     - Discussed: Risks, Benefits and Alternatives for BOTH SEDATION and the PROCEDURE were discussed     - Discussed with:  Patient      - Extended Intubation/Ventilatory Support Discussed: No.      - Patient is DNR/DNI Status: No     Use of blood products discussed: No .     Postoperative Care    Pain management: Peripheral nerve block (Single Shot).   PONV prophylaxis: Scopolamine patch, Dexamethasone or Solumedrol, Ondansetron (or other 5HT-3), Background Propofol Infusion     Comments:    Other Comments: Rosas 11/9/23 HP signed    Preop /81            JACINTA ROBERTSON CRNA

## 2023-11-16 ENCOUNTER — HOSPITAL ENCOUNTER (INPATIENT)
Facility: HOSPITAL | Age: 73
LOS: 6 days | Discharge: HOME | DRG: 331 | End: 2023-11-22
Attending: SURGERY | Admitting: SURGERY
Payer: COMMERCIAL

## 2023-11-16 ENCOUNTER — ANESTHESIA (OUTPATIENT)
Dept: SURGERY | Facility: HOSPITAL | Age: 73
DRG: 331 | End: 2023-11-16
Payer: COMMERCIAL

## 2023-11-16 ENCOUNTER — APPOINTMENT (OUTPATIENT)
Dept: ULTRASOUND IMAGING | Facility: HOSPITAL | Age: 73
DRG: 331 | End: 2023-11-16
Attending: NURSE ANESTHETIST, CERTIFIED REGISTERED
Payer: COMMERCIAL

## 2023-11-16 DIAGNOSIS — D12.2 ADENOMATOUS POLYP OF ASCENDING COLON: Primary | ICD-10-CM

## 2023-11-16 LAB
GLUCOSE BLDC GLUCOMTR-MCNC: 111 MG/DL (ref 70–99)
GLUCOSE BLDC GLUCOMTR-MCNC: 136 MG/DL (ref 70–99)

## 2023-11-16 PROCEDURE — 370N000017 HC ANESTHESIA TECHNICAL FEE, PER MIN: Performed by: SURGERY

## 2023-11-16 PROCEDURE — 710N000010 HC RECOVERY PHASE 1, LEVEL 2, PER MIN: Performed by: SURGERY

## 2023-11-16 PROCEDURE — 258N000003 HC RX IP 258 OP 636: Performed by: NURSE ANESTHETIST, CERTIFIED REGISTERED

## 2023-11-16 PROCEDURE — 120N000001 HC R&B MED SURG/OB

## 2023-11-16 PROCEDURE — 44140 PARTIAL REMOVAL OF COLON: CPT | Performed by: SURGERY

## 2023-11-16 PROCEDURE — 999N000141 HC STATISTIC PRE-PROCEDURE NURSING ASSESSMENT: Performed by: SURGERY

## 2023-11-16 PROCEDURE — 250N000011 HC RX IP 250 OP 636: Performed by: NURSE ANESTHETIST, CERTIFIED REGISTERED

## 2023-11-16 PROCEDURE — 250N000025 HC SEVOFLURANE, PER MIN: Performed by: SURGERY

## 2023-11-16 PROCEDURE — 360N000077 HC SURGERY LEVEL 4, PER MIN: Performed by: SURGERY

## 2023-11-16 PROCEDURE — 250N000009 HC RX 250: Performed by: NURSE ANESTHETIST, CERTIFIED REGISTERED

## 2023-11-16 PROCEDURE — 258N000003 HC RX IP 258 OP 636: Performed by: SURGERY

## 2023-11-16 PROCEDURE — 44140 PARTIAL REMOVAL OF COLON: CPT

## 2023-11-16 PROCEDURE — 88307 TISSUE EXAM BY PATHOLOGIST: CPT | Mod: 26 | Performed by: PATHOLOGY

## 2023-11-16 PROCEDURE — 0DTF0ZZ RESECTION OF RIGHT LARGE INTESTINE, OPEN APPROACH: ICD-10-PCS | Performed by: SURGERY

## 2023-11-16 PROCEDURE — 64999 UNLISTED PX NERVOUS SYSTEM: CPT | Mod: XU | Performed by: NURSE ANESTHETIST, CERTIFIED REGISTERED

## 2023-11-16 PROCEDURE — 250N000011 HC RX IP 250 OP 636: Mod: JZ | Performed by: NURSE ANESTHETIST, CERTIFIED REGISTERED

## 2023-11-16 PROCEDURE — C9290 INJ, BUPIVACAINE LIPOSOME: HCPCS | Performed by: NURSE ANESTHETIST, CERTIFIED REGISTERED

## 2023-11-16 PROCEDURE — 250N000013 HC RX MED GY IP 250 OP 250 PS 637: Performed by: SURGERY

## 2023-11-16 PROCEDURE — 272N000001 HC OR GENERAL SUPPLY STERILE: Performed by: SURGERY

## 2023-11-16 PROCEDURE — 88307 TISSUE EXAM BY PATHOLOGIST: CPT | Mod: TC | Performed by: SURGERY

## 2023-11-16 PROCEDURE — 99100 ANES PT EXTEME AGE<1 YR&>70: CPT

## 2023-11-16 PROCEDURE — 250N000011 HC RX IP 250 OP 636: Mod: JZ | Performed by: SURGERY

## 2023-11-16 RX ORDER — NALOXONE HYDROCHLORIDE 0.4 MG/ML
0.2 INJECTION, SOLUTION INTRAMUSCULAR; INTRAVENOUS; SUBCUTANEOUS
Status: DISCONTINUED | OUTPATIENT
Start: 2023-11-16 | End: 2023-11-22 | Stop reason: HOSPADM

## 2023-11-16 RX ORDER — SODIUM CHLORIDE, SODIUM LACTATE, POTASSIUM CHLORIDE, CALCIUM CHLORIDE 600; 310; 30; 20 MG/100ML; MG/100ML; MG/100ML; MG/100ML
INJECTION, SOLUTION INTRAVENOUS CONTINUOUS
Status: DISCONTINUED | OUTPATIENT
Start: 2023-11-16 | End: 2023-11-16 | Stop reason: HOSPADM

## 2023-11-16 RX ORDER — POLYETHYLENE GLYCOL 3350 17 G/17G
17 POWDER, FOR SOLUTION ORAL DAILY
Status: DISCONTINUED | OUTPATIENT
Start: 2023-11-17 | End: 2023-11-21

## 2023-11-16 RX ORDER — SODIUM CHLORIDE, SODIUM LACTATE, POTASSIUM CHLORIDE, CALCIUM CHLORIDE 600; 310; 30; 20 MG/100ML; MG/100ML; MG/100ML; MG/100ML
INJECTION, SOLUTION INTRAVENOUS CONTINUOUS
Status: DISCONTINUED | OUTPATIENT
Start: 2023-11-16 | End: 2023-11-18

## 2023-11-16 RX ORDER — DEXAMETHASONE SODIUM PHOSPHATE 4 MG/ML
INJECTION, SOLUTION INTRA-ARTICULAR; INTRALESIONAL; INTRAMUSCULAR; INTRAVENOUS; SOFT TISSUE PRN
Status: DISCONTINUED | OUTPATIENT
Start: 2023-11-16 | End: 2023-11-16

## 2023-11-16 RX ORDER — LIDOCAINE HYDROCHLORIDE 20 MG/ML
INJECTION, SOLUTION INFILTRATION; PERINEURAL PRN
Status: DISCONTINUED | OUTPATIENT
Start: 2023-11-16 | End: 2023-11-16

## 2023-11-16 RX ORDER — HYDROMORPHONE HYDROCHLORIDE 1 MG/ML
0.4 INJECTION, SOLUTION INTRAMUSCULAR; INTRAVENOUS; SUBCUTANEOUS EVERY 5 MIN PRN
Status: DISCONTINUED | OUTPATIENT
Start: 2023-11-16 | End: 2023-11-16 | Stop reason: HOSPADM

## 2023-11-16 RX ORDER — CEFAZOLIN SODIUM/WATER 2 G/20 ML
2 SYRINGE (ML) INTRAVENOUS
Status: COMPLETED | OUTPATIENT
Start: 2023-11-16 | End: 2023-11-16

## 2023-11-16 RX ORDER — HYDROMORPHONE HYDROCHLORIDE 1 MG/ML
0.2 INJECTION, SOLUTION INTRAMUSCULAR; INTRAVENOUS; SUBCUTANEOUS EVERY 5 MIN PRN
Status: DISCONTINUED | OUTPATIENT
Start: 2023-11-16 | End: 2023-11-16 | Stop reason: HOSPADM

## 2023-11-16 RX ORDER — LIDOCAINE 40 MG/G
CREAM TOPICAL
Status: DISCONTINUED | OUTPATIENT
Start: 2023-11-16 | End: 2023-11-16

## 2023-11-16 RX ORDER — HYDROMORPHONE HCL IN WATER/PF 6 MG/30 ML
0.4 PATIENT CONTROLLED ANALGESIA SYRINGE INTRAVENOUS
Status: DISCONTINUED | OUTPATIENT
Start: 2023-11-16 | End: 2023-11-22 | Stop reason: HOSPADM

## 2023-11-16 RX ORDER — BISACODYL 10 MG
10 SUPPOSITORY, RECTAL RECTAL DAILY PRN
Status: DISCONTINUED | OUTPATIENT
Start: 2023-11-16 | End: 2023-11-22 | Stop reason: HOSPADM

## 2023-11-16 RX ORDER — OXYCODONE HYDROCHLORIDE 5 MG/1
5 TABLET ORAL EVERY 4 HOURS PRN
Status: DISCONTINUED | OUTPATIENT
Start: 2023-11-16 | End: 2023-11-22 | Stop reason: HOSPADM

## 2023-11-16 RX ORDER — NALOXONE HYDROCHLORIDE 0.4 MG/ML
0.4 INJECTION, SOLUTION INTRAMUSCULAR; INTRAVENOUS; SUBCUTANEOUS
Status: DISCONTINUED | OUTPATIENT
Start: 2023-11-16 | End: 2023-11-22 | Stop reason: HOSPADM

## 2023-11-16 RX ORDER — BUPIVACAINE HYDROCHLORIDE 5 MG/ML
INJECTION, SOLUTION EPIDURAL; INTRACAUDAL
Status: DISCONTINUED
Start: 2023-11-16 | End: 2023-11-16 | Stop reason: WASHOUT

## 2023-11-16 RX ORDER — BUPIVACAINE HYDROCHLORIDE 2.5 MG/ML
INJECTION, SOLUTION EPIDURAL; INFILTRATION; INTRACAUDAL
Status: COMPLETED
Start: 2023-11-16 | End: 2023-11-16

## 2023-11-16 RX ORDER — HALOPERIDOL 5 MG/ML
1 INJECTION INTRAMUSCULAR
Status: DISCONTINUED | OUTPATIENT
Start: 2023-11-16 | End: 2023-11-16 | Stop reason: HOSPADM

## 2023-11-16 RX ORDER — HYDROMORPHONE HCL IN WATER/PF 6 MG/30 ML
0.2 PATIENT CONTROLLED ANALGESIA SYRINGE INTRAVENOUS
Status: DISCONTINUED | OUTPATIENT
Start: 2023-11-16 | End: 2023-11-22 | Stop reason: HOSPADM

## 2023-11-16 RX ORDER — ONDANSETRON 2 MG/ML
4 INJECTION INTRAMUSCULAR; INTRAVENOUS EVERY 6 HOURS PRN
Status: DISCONTINUED | OUTPATIENT
Start: 2023-11-16 | End: 2023-11-22 | Stop reason: HOSPADM

## 2023-11-16 RX ORDER — LABETALOL 20 MG/4 ML (5 MG/ML) INTRAVENOUS SYRINGE
10
Status: DISCONTINUED | OUTPATIENT
Start: 2023-11-16 | End: 2023-11-16 | Stop reason: HOSPADM

## 2023-11-16 RX ORDER — AMOXICILLIN 250 MG
1 CAPSULE ORAL 2 TIMES DAILY
Status: DISCONTINUED | OUTPATIENT
Start: 2023-11-16 | End: 2023-11-21

## 2023-11-16 RX ORDER — ONDANSETRON 4 MG/1
4 TABLET, ORALLY DISINTEGRATING ORAL EVERY 6 HOURS PRN
Status: DISCONTINUED | OUTPATIENT
Start: 2023-11-16 | End: 2023-11-22 | Stop reason: HOSPADM

## 2023-11-16 RX ORDER — METRONIDAZOLE 500 MG/100ML
500 INJECTION, SOLUTION INTRAVENOUS
Status: COMPLETED | OUTPATIENT
Start: 2023-11-16 | End: 2023-11-16

## 2023-11-16 RX ORDER — HYDRALAZINE HYDROCHLORIDE 20 MG/ML
2.5-5 INJECTION INTRAMUSCULAR; INTRAVENOUS EVERY 10 MIN PRN
Status: DISCONTINUED | OUTPATIENT
Start: 2023-11-16 | End: 2023-11-16 | Stop reason: HOSPADM

## 2023-11-16 RX ORDER — ACETAMINOPHEN 325 MG/1
975 TABLET ORAL EVERY 8 HOURS
Status: COMPLETED | OUTPATIENT
Start: 2023-11-16 | End: 2023-11-19

## 2023-11-16 RX ORDER — FENTANYL CITRATE 50 UG/ML
25 INJECTION, SOLUTION INTRAMUSCULAR; INTRAVENOUS EVERY 5 MIN PRN
Status: DISCONTINUED | OUTPATIENT
Start: 2023-11-16 | End: 2023-11-16 | Stop reason: HOSPADM

## 2023-11-16 RX ORDER — ALBUTEROL SULFATE 0.83 MG/ML
2.5 SOLUTION RESPIRATORY (INHALATION) EVERY 4 HOURS PRN
Status: DISCONTINUED | OUTPATIENT
Start: 2023-11-16 | End: 2023-11-16 | Stop reason: HOSPADM

## 2023-11-16 RX ORDER — GLYCOPYRROLATE 0.2 MG/ML
INJECTION, SOLUTION INTRAMUSCULAR; INTRAVENOUS PRN
Status: DISCONTINUED | OUTPATIENT
Start: 2023-11-16 | End: 2023-11-16

## 2023-11-16 RX ORDER — FENTANYL CITRATE 50 UG/ML
50 INJECTION, SOLUTION INTRAMUSCULAR; INTRAVENOUS EVERY 5 MIN PRN
Status: DISCONTINUED | OUTPATIENT
Start: 2023-11-16 | End: 2023-11-16 | Stop reason: HOSPADM

## 2023-11-16 RX ORDER — OXYCODONE HYDROCHLORIDE 5 MG/1
10 TABLET ORAL EVERY 4 HOURS PRN
Status: DISCONTINUED | OUTPATIENT
Start: 2023-11-16 | End: 2023-11-22 | Stop reason: HOSPADM

## 2023-11-16 RX ORDER — ONDANSETRON 2 MG/ML
4 INJECTION INTRAMUSCULAR; INTRAVENOUS EVERY 30 MIN PRN
Status: DISCONTINUED | OUTPATIENT
Start: 2023-11-16 | End: 2023-11-16 | Stop reason: HOSPADM

## 2023-11-16 RX ORDER — ENOXAPARIN SODIUM 100 MG/ML
40 INJECTION SUBCUTANEOUS EVERY 24 HOURS
Status: DISCONTINUED | OUTPATIENT
Start: 2023-11-17 | End: 2023-11-22 | Stop reason: HOSPADM

## 2023-11-16 RX ORDER — DEXAMETHASONE SODIUM PHOSPHATE 10 MG/ML
INJECTION, SOLUTION INTRAMUSCULAR; INTRAVENOUS
Status: COMPLETED
Start: 2023-11-16 | End: 2023-11-16

## 2023-11-16 RX ORDER — BUPIVACAINE HYDROCHLORIDE 2.5 MG/ML
INJECTION, SOLUTION EPIDURAL; INFILTRATION; INTRACAUDAL
Status: COMPLETED | OUTPATIENT
Start: 2023-11-16 | End: 2023-11-16

## 2023-11-16 RX ORDER — FENTANYL CITRATE 50 UG/ML
INJECTION, SOLUTION INTRAMUSCULAR; INTRAVENOUS PRN
Status: DISCONTINUED | OUTPATIENT
Start: 2023-11-16 | End: 2023-11-16

## 2023-11-16 RX ORDER — DEXAMETHASONE SODIUM PHOSPHATE 10 MG/ML
INJECTION, SOLUTION INTRAMUSCULAR; INTRAVENOUS
Status: COMPLETED | OUTPATIENT
Start: 2023-11-16 | End: 2023-11-16

## 2023-11-16 RX ORDER — CEFAZOLIN SODIUM/WATER 2 G/20 ML
2 SYRINGE (ML) INTRAVENOUS SEE ADMIN INSTRUCTIONS
Status: DISCONTINUED | OUTPATIENT
Start: 2023-11-16 | End: 2023-11-20

## 2023-11-16 RX ORDER — ONDANSETRON 4 MG/1
4 TABLET, ORALLY DISINTEGRATING ORAL EVERY 30 MIN PRN
Status: DISCONTINUED | OUTPATIENT
Start: 2023-11-16 | End: 2023-11-16 | Stop reason: HOSPADM

## 2023-11-16 RX ORDER — FAMOTIDINE 20 MG/1
20 TABLET, FILM COATED ORAL 2 TIMES DAILY
Status: DISCONTINUED | OUTPATIENT
Start: 2023-11-16 | End: 2023-11-22 | Stop reason: HOSPADM

## 2023-11-16 RX ORDER — PROCHLORPERAZINE MALEATE 5 MG
5 TABLET ORAL EVERY 6 HOURS PRN
Status: DISCONTINUED | OUTPATIENT
Start: 2023-11-16 | End: 2023-11-22 | Stop reason: HOSPADM

## 2023-11-16 RX ORDER — PROPOFOL 10 MG/ML
INJECTION, EMULSION INTRAVENOUS PRN
Status: DISCONTINUED | OUTPATIENT
Start: 2023-11-16 | End: 2023-11-16

## 2023-11-16 RX ORDER — LIDOCAINE 40 MG/G
CREAM TOPICAL
Status: DISCONTINUED | OUTPATIENT
Start: 2023-11-16 | End: 2023-11-22 | Stop reason: HOSPADM

## 2023-11-16 RX ORDER — DIAZEPAM 10 MG/2ML
5-10 INJECTION, SOLUTION INTRAMUSCULAR; INTRAVENOUS EVERY 6 HOURS PRN
Status: DISCONTINUED | OUTPATIENT
Start: 2023-11-16 | End: 2023-11-22 | Stop reason: HOSPADM

## 2023-11-16 RX ORDER — ACETAMINOPHEN 325 MG/1
650 TABLET ORAL EVERY 4 HOURS PRN
Status: DISCONTINUED | OUTPATIENT
Start: 2023-11-19 | End: 2023-11-22 | Stop reason: HOSPADM

## 2023-11-16 RX ORDER — SCOLOPAMINE TRANSDERMAL SYSTEM 1 MG/1
1 PATCH, EXTENDED RELEASE TRANSDERMAL ONCE
Status: COMPLETED | OUTPATIENT
Start: 2023-11-16 | End: 2023-11-17

## 2023-11-16 RX ORDER — ONDANSETRON 2 MG/ML
INJECTION INTRAMUSCULAR; INTRAVENOUS PRN
Status: DISCONTINUED | OUTPATIENT
Start: 2023-11-16 | End: 2023-11-16

## 2023-11-16 RX ADMIN — FENTANYL CITRATE 100 MCG: 50 INJECTION INTRAMUSCULAR; INTRAVENOUS at 07:46

## 2023-11-16 RX ADMIN — FENTANYL CITRATE 100 MCG: 50 INJECTION INTRAMUSCULAR; INTRAVENOUS at 08:12

## 2023-11-16 RX ADMIN — PHENYLEPHRINE HYDROCHLORIDE 50 MCG: 10 INJECTION INTRAVENOUS at 07:59

## 2023-11-16 RX ADMIN — METRONIDAZOLE 500 MG: 500 INJECTION, SOLUTION INTRAVENOUS at 06:49

## 2023-11-16 RX ADMIN — ONDANSETRON 4 MG: 2 INJECTION INTRAMUSCULAR; INTRAVENOUS at 07:45

## 2023-11-16 RX ADMIN — SUGAMMADEX 200 MG: 100 INJECTION, SOLUTION INTRAVENOUS at 08:50

## 2023-11-16 RX ADMIN — LIDOCAINE HYDROCHLORIDE 40 MG: 20 INJECTION, SOLUTION INFILTRATION; PERINEURAL at 07:45

## 2023-11-16 RX ADMIN — GLYCOPYRROLATE 0.2 MG: 0.2 INJECTION, SOLUTION INTRAMUSCULAR; INTRAVENOUS at 08:17

## 2023-11-16 RX ADMIN — ROCURONIUM BROMIDE 50 MG: 10 INJECTION INTRAVENOUS at 07:48

## 2023-11-16 RX ADMIN — ACETAMINOPHEN 975 MG: 325 TABLET, FILM COATED ORAL at 17:42

## 2023-11-16 RX ADMIN — ACETAMINOPHEN 975 MG: 325 TABLET, FILM COATED ORAL at 11:16

## 2023-11-16 RX ADMIN — FAMOTIDINE 20 MG: 20 TABLET ORAL at 22:03

## 2023-11-16 RX ADMIN — SODIUM CHLORIDE, POTASSIUM CHLORIDE, SODIUM LACTATE AND CALCIUM CHLORIDE: 600; 310; 30; 20 INJECTION, SOLUTION INTRAVENOUS at 10:58

## 2023-11-16 RX ADMIN — PROPOFOL 200 MG: 10 INJECTION, EMULSION INTRAVENOUS at 07:47

## 2023-11-16 RX ADMIN — BUPIVACAINE HYDROCHLORIDE 30 ML: 2.5 INJECTION, SOLUTION EPIDURAL; INFILTRATION; INTRACAUDAL at 07:20

## 2023-11-16 RX ADMIN — SENNOSIDES AND DOCUSATE SODIUM 1 TABLET: 8.6; 5 TABLET ORAL at 11:17

## 2023-11-16 RX ADMIN — FAMOTIDINE 20 MG: 10 INJECTION, SOLUTION INTRAVENOUS at 11:18

## 2023-11-16 RX ADMIN — SODIUM CHLORIDE, POTASSIUM CHLORIDE, SODIUM LACTATE AND CALCIUM CHLORIDE: 600; 310; 30; 20 INJECTION, SOLUTION INTRAVENOUS at 16:11

## 2023-11-16 RX ADMIN — FENTANYL CITRATE 50 MCG: 50 INJECTION, SOLUTION INTRAMUSCULAR; INTRAVENOUS at 09:32

## 2023-11-16 RX ADMIN — Medication 2 G: at 07:11

## 2023-11-16 RX ADMIN — PHENYLEPHRINE HYDROCHLORIDE 50 MCG: 10 INJECTION INTRAVENOUS at 08:17

## 2023-11-16 RX ADMIN — FENTANYL CITRATE 50 MCG: 50 INJECTION, SOLUTION INTRAMUSCULAR; INTRAVENOUS at 09:18

## 2023-11-16 RX ADMIN — OXYCODONE HYDROCHLORIDE 5 MG: 5 TABLET ORAL at 22:22

## 2023-11-16 RX ADMIN — SCOPALAMINE 1 PATCH: 1 PATCH, EXTENDED RELEASE TRANSDERMAL at 07:37

## 2023-11-16 RX ADMIN — DEXAMETHASONE SODIUM PHOSPHATE 10 MG: 4 INJECTION, SOLUTION INTRA-ARTICULAR; INTRALESIONAL; INTRAMUSCULAR; INTRAVENOUS; SOFT TISSUE at 07:55

## 2023-11-16 RX ADMIN — PHENYLEPHRINE HYDROCHLORIDE 50 MCG: 10 INJECTION INTRAVENOUS at 07:56

## 2023-11-16 RX ADMIN — SODIUM CHLORIDE, POTASSIUM CHLORIDE, SODIUM LACTATE AND CALCIUM CHLORIDE: 600; 310; 30; 20 INJECTION, SOLUTION INTRAVENOUS at 08:40

## 2023-11-16 RX ADMIN — DEXAMETHASONE SODIUM PHOSPHATE 10 MG: 10 INJECTION, SOLUTION INTRAMUSCULAR; INTRAVENOUS at 07:20

## 2023-11-16 RX ADMIN — SODIUM CHLORIDE, POTASSIUM CHLORIDE, SODIUM LACTATE AND CALCIUM CHLORIDE: 600; 310; 30; 20 INJECTION, SOLUTION INTRAVENOUS at 06:49

## 2023-11-16 RX ADMIN — OXYCODONE HYDROCHLORIDE 5 MG: 5 TABLET ORAL at 13:15

## 2023-11-16 RX ADMIN — PROPOFOL 100 MG: 10 INJECTION, EMULSION INTRAVENOUS at 08:12

## 2023-11-16 RX ADMIN — SENNOSIDES AND DOCUSATE SODIUM 1 TABLET: 8.6; 5 TABLET ORAL at 22:03

## 2023-11-16 RX ADMIN — BUPIVACAINE 21 ML: 13.3 INJECTION, SUSPENSION, LIPOSOMAL INFILTRATION at 07:20

## 2023-11-16 ASSESSMENT — ACTIVITIES OF DAILY LIVING (ADL)
HEARING_DIFFICULTY_OR_DEAF: NO
ADLS_ACUITY_SCORE: 20
ADLS_ACUITY_SCORE: 22
ADLS_ACUITY_SCORE: 22
DIFFICULTY_COMMUNICATING: NO
ADLS_ACUITY_SCORE: 18
ADLS_ACUITY_SCORE: 22
TOILETING_ISSUES: NO
ADLS_ACUITY_SCORE: 22
VISION_MANAGEMENT: GLASSES
DIFFICULTY_EATING/SWALLOWING: NO
ADLS_ACUITY_SCORE: 20
ADLS_ACUITY_SCORE: 20
WEAR_GLASSES_OR_BLIND: YES
DRESSING/BATHING_DIFFICULTY: NO
ADLS_ACUITY_SCORE: 20
WALKING_OR_CLIMBING_STAIRS_DIFFICULTY: NO
CHANGE_IN_FUNCTIONAL_STATUS_SINCE_ONSET_OF_CURRENT_ILLNESS/INJURY: NO
FALL_HISTORY_WITHIN_LAST_SIX_MONTHS: NO
CONCENTRATING,_REMEMBERING_OR_MAKING_DECISIONS_DIFFICULTY: NO

## 2023-11-16 NOTE — ANESTHESIA CARE TRANSFER NOTE
Patient: Christopher Valentine    Procedure: Procedure(s):  hand-assisted laparoscopic right hemicolectomy       Diagnosis: Polyp of large intestine [K63.5]  Diagnosis Additional Information: No value filed.    Anesthesia Type:   General     Note:    Oropharynx: oropharynx clear of all foreign objects and spontaneously breathing  Level of Consciousness: awake  Oxygen Supplementation: room air      Dentition: dentition unchanged S/P dental procedure  Vital Signs Stable: post-procedure vital signs reviewed and stable  Report to RN Given: handoff report given  Patient transferred to: PACU    Handoff Report: Identifed the Patient, Identified the Reponsible Provider, Reviewed the pertinent medical history, Discussed the surgical course, Reviewed Intra-OP anesthesia mangement and issues during anesthesia, Set expectations for post-procedure period and Allowed opportunity for questions and acknowledgement of understanding  Vitals:  Vitals Value Taken Time   /82 11/16/23 0910   Temp     Pulse 73 11/16/23 0911   Resp 9 11/16/23 0911   SpO2 96 % 11/16/23 0911   Vitals shown include unfiled device data.    Electronically Signed By: Rusty Solis  November 16, 2023  9:12 AM

## 2023-11-16 NOTE — ANESTHESIA PROCEDURE NOTES
Erector spinae Procedure Note    Pre-Procedure   Staff -        Resident/Fellow: Rusty Solis       CRNA: Jeff Hernandez APRN CRNA       Performed By: CRNA and ROGELIO       Location: pre-op       Procedure Start/Stop Times: 11/16/2023 7:20 AM and 11/16/2023 7:33 AM       Pre-Anesthestic Checklist: patient identified, IV checked, site marked, risks and benefits discussed, informed consent, monitors and equipment checked, pre-op evaluation, at physician/surgeon's request and post-op pain management  Timeout:       Correct Patient: Yes        Correct Procedure: Yes        Correct Site: Yes        Correct Position: Yes        Correct Laterality: Yes        Site Marked: Yes  Procedure Documentation  Procedure: Erector spinae       Diagnosis: POST OP PAIN CONTROL       Laterality: bilateral       Patient Position: sitting       Skin prep: Chloraprep       Needle Type: insulated and short bevel       Needle Gauge: 20.        Needle Length (Inches): 4        Ultrasound guided       1. Ultrasound was used to identify targeted nerve, plexus, vascular marker, or fascial plane and place a needle adjacent to it in real-time.       2. Ultrasound was used to visualize the spread of anesthetic in close proximity to the above referenced structure.       3. A permanent image is entered into the patient's record.       4. The visualized anatomic structures appeared normal.       5. There were no apparent abnormal pathologic findings.    Assessment/Narrative         The placement was negative for: blood aspirated, painful injection and site bleeding       Paresthesias: No.       Bolus given via needle..        Secured via.        Insertion/Infusion Method: Single Shot       Complications: none       Injection made incrementally with aspirations every 5 mL.    Medication(s) Administered   Bupivacaine 0.25% PF (Infiltration) - Infiltration   30 mL - 11/16/2023 7:20:00 AM  Bupivacaine liposome (Exparel) 1.3% LA inj susp  "(Infiltration) - Infiltration   21 mL - 11/16/2023 7:20:00 AM  Dexamethasone 10 mg/mL PF (Perineural) - Perineural   10 mg - 11/16/2023 7:20:00 AM  Medication Administration Time: 11/16/2023 7:20 AM     Comments:  Risks for regional anesthesia include but not limited to: infection, seizures, continued weakness or numbness, pain at injection site, injury to blood vessels      FOR [facility] ([facility] [place]) ONLY:   Pain Team Contact information: please page the Pain Team Via [employer]. Search \"Pain\". During daytime hours, please page the attending first. At night please page the resident first.      "

## 2023-11-16 NOTE — ANESTHESIA POSTPROCEDURE EVALUATION
Patient: Christopher Valentine    Procedure: Procedure(s):  hand-assisted laparoscopic right hemicolectomy       Anesthesia Type:  General    Note:  Disposition: Admission   Postop Pain Control: Uneventful            Sign Out: Well controlled pain   PONV: No   Neuro/Psych: Uneventful            Sign Out: Acceptable/Baseline neuro status   Airway/Respiratory: Uneventful            Sign Out: Acceptable/Baseline resp. status   CV/Hemodynamics: Uneventful            Sign Out: Acceptable CV status; No obvious hypovolemia; No obvious fluid overload   Other NRE: NONE   DID A NON-ROUTINE EVENT OCCUR? No       Last vitals:  Vitals Value Taken Time   /88 11/16/23 0942   Temp 98.4  F (36.9  C) 11/16/23 0942   Pulse 74 11/16/23 0944   Resp 16 11/16/23 0945   SpO2 96 % 11/16/23 0952   Vitals shown include unfiled device data.    Electronically Signed By: JACINTA ROBERTSON CRNA  November 16, 2023  11:09 AM

## 2023-11-16 NOTE — PLAN OF CARE
[facility] Inpatient Admission Note:    Patient admitted to 3228/3228-1 at approximately 10:10 via cart accompanied by staff from surgery . Report received from Ani in SBAR format at 10:10 via face to face in room. Patient transferred to bed via self.. Patient is alert and oriented X 3, reports pain; rates at 4 on 0-10 scale.  Patient oriented to room, unit, hourly rounding, and plan of care. Explained admission packet and patient handbook with patient bill of rights brochure. Will continue to monitor and document as needed.     Inpatient Nursing criteria listed below was met:    Health care directives status obtained and documented: Yes    Patient identifies a surrogate decision maker: Yes If yes, who: Kaylee (spouse) Contact Information: See face sheet      If initial lactic acid greater than 2.0, repeat lactic acid drawn within one hour of arrival to unit: NA. If no, state reason: N/A    Clergy visit ordered if patient requests:  Pt denies at this time.     Skin issues/needs documented: Yes    Isolation Patient: no Education given, correct sign in place and documentation row added to PCS:  Yes    Fall Prevention Yes and No: Care plan updated, education given and documented, sticker and magnet in place: Yes    Care Plan initiated: Yes    Education Documented (including assessment): Yes

## 2023-11-16 NOTE — ANESTHESIA PROCEDURE NOTES
Airway       Patient location during procedure: OR       Procedure Start/Stop Times: 11/16/2023 7:50 AM  Staff -        Resident/Fellow: Rusty Solis       CRNA: Yaniv Sorto APRN CRNA       Performed By: SRNA  Consent for Airway        Urgency: elective  Indications and Patient Condition       Indications for airway management: itzel-procedural and airway protection       Induction type:intravenous       Mask difficulty assessment: 1 - vent by mask    Final Airway Details       Final airway type: endotracheal airway       Successful airway: ETT - single  Endotracheal Airway Details        ETT size (mm): 7.5       Cuffed: yes       Cuff volume (mL): 8       Successful intubation technique: direct laryngoscopy       DL Blade Type: Mann 2       Grade View of Cords: 1       Adjucts: stylet       Position: Center       Measured from: gums/teeth       Secured at (cm): 22       Bite block used: None    Post intubation assessment        Placement verified by: capnometry, equal breath sounds and chest rise        Number of attempts at approach: 1       Number of other approaches attempted: 0       Secured with: tape       Ease of procedure: easy       Dentition: Intact    Medication(s) Administered   Medication Administration Time: 11/16/2023 7:50 AM

## 2023-11-16 NOTE — OP NOTE
REPORT OF OPERATION  DATE OF PROCEDURE: 11/16/2023    PATIENT: Christopher Valentine    SURGERY PERFORMED: Hand Assisted Laparoscopic Right Hemicolectomy    PREOPERATIVE DIAGNOSIS: Colon polyp in cecum    POSTOPERATIVE DIAGNOSIS: Same    SURGEON: Porfirio Matthew MD    ASSISTANTS: Aracelis Che CNP, Her assistance was required because of the technical aspects of the case    ANESTHESIA: General Endotracheal Anesthesia    COMPLICATIONS: None apparent    TRANSFUSIONS: None    TISSUE TO PATHOLOGY: Right colon to Pathology for pathological diagnoses    FINDINGS: Polyp of question was found in the specimen when the specimen was opened on the back table.  No evidence of any metastatic disease.    INDICATIONS: This is a 73 year old male with a large polyp in the cecum which was recently found on colonoscopy..  The patient will be taken to the operating room for a hand assisted laparoscopic right hemicolectomy possible open right hemicolectomy    DESCRIPTIONS OF PROCEDURE IN DETAIL: After consent was obtained the patient was taken to the operative suite and alfonzo in the supine position.  The patient was identified and the correct patient was confirmed.  General Endotracheal Anesthesia was administered by anesthesia.  The patient was sterilely prepped and draped in the usual fashion.  A time out was performed verifying the correct patient and the correct procedure.  The entire operative team was in agreement.  All necessary equipment and supplies were in the room.     Through a periumbilical incision, the skin was sharply entered, dissection was taken down to isolation of the fascia.  The fascia was opened and entrance into the abdomen was accomplished.  The hand port was inserted into the abdomen and pneumoperitoneum was obtained.  Two 5 mm trocars were placed in the left and right upper quadrants respectively.  The colon was mobilized along the right white line of Toldt using the LigaSure.  The mobilization was continued  from the cecum up the right colic gutter to the hepatic flexure.  The hepatic flexure was then taken down, along with the gastrocolic ligament to the transverse colon, allowing the colon to be brought down to the hand port.  Any attachments of the distal ileum were also taken down giving full mobilization of the small bowel, right and transverse colon.  The colon was extracorporealized through the hand post.  Points of resection were then determined.  A window was made around the distal ileum and the ileum was divided using a FLY-75 stapler.  A window was made around the ascending colon (preservation of the right colic artery) and the colon was divided using a FLY-75 stapler.  The intervening mesentery was then taken down using the LigaSure.  Hemostasis was assured.  The specimen was removed, passed off the field, opened on the back table, and the lesion and area of interest was verified to be contained within the specimen.  The specimen was then sent to Pathology for pathologic diagnosis.  Hemostasis was assured.  A side-to-side functional end-to-end anastomosis was created, bringing the distal ileum to the colon using a FLY-75 stapler.  The colon was then closed with a TA-60 stapler.  The staple line was imbricated with Lembert stitches of 3-0 silk sutures.  The mesenteric rent was closed with running 3-0 Vicryl suture.  Hemostasis was assured.  The abdomen was irrigated and the irrigant was removed and again hemostasis was assured.  The bowel was placed back into the abdomen.  All instrumentation was removed from the abdomen.  All instrument, needle, and sponge counts were correct x2.  The midline incisions' fascia was closed with looped #1 PDS and all skin incisions were closed using stainless steel staples.  Sterile dressings were applied.  The patient was awakened in the operating room, extubated without difficulty, and taken to the recovery room in stable condition, tolerating the procedure well.

## 2023-11-16 NOTE — MEDICATION SCRIBE - ADMISSION MEDICATION HISTORY
Medication Scribe Admission Medication History    Admission medication history is complete. The information provided in this note is only as accurate as the sources available at the time of the update.    Information Source(s): Patient and CareEverywhere/SureScripts via in-person    Pertinent Information:   Patient denies taking any prescribed medications, nor is supposed to be taking any prescribed medications, at this time (scheduled and/or PRN).     Patient denies taking any OTC vitamins, supplements or analgesics.     No conflicting data from any available outside sources.       Changes made to PTA medication list:  Added: None  Deleted: neomycin, flagyl and bisacodyl- pt completed surgical prep meds as prescribed yesterday  Changed: None    Medication Affordability: did not assess- pt not on rx medications     Allergies reviewed with patient and updates made in EHR: yes    Medication History Completed By: Debbie Donahue 11/16/2023 12:28 PM    No outpatient medications have been marked as taking for the 11/16/23 encounter (Hospital Encounter).

## 2023-11-16 NOTE — PLAN OF CARE
Pt A&O x 4 this shift. Pt afebrile, and remains on room air this shift. Lung sounds are clear.   Pt complaints of  4/10 pain to abdomen/incision. Pt receives scheduled tylenol, and  PRN Oxycodone this shift, and reports pain relief. Pt denies ice when offered.   Foam dressing in place to abdomen/incision, small drainage this shift, and dressing is marked.   Bowel sounds audible in all 4 quadrants. Abdomen is tender, but soft.   Boyd catheter in place, and pt has adequate straw yellow output this shift.   Pt tolerating clear liquid diet this shift.       Face to face report given with opportunity to observe patient.    Report given to JONG De Leon.     Elsa Holland RN   11/16/2023  7:33 PM

## 2023-11-17 ENCOUNTER — APPOINTMENT (OUTPATIENT)
Dept: PHYSICAL THERAPY | Facility: HOSPITAL | Age: 73
DRG: 331 | End: 2023-11-17
Attending: SURGERY
Payer: COMMERCIAL

## 2023-11-17 LAB
ANION GAP SERPL CALCULATED.3IONS-SCNC: 10 MMOL/L (ref 7–15)
BASOPHILS # BLD AUTO: 0 10E3/UL (ref 0–0.2)
BASOPHILS NFR BLD AUTO: 0 %
BUN SERPL-MCNC: 4.4 MG/DL (ref 8–23)
CALCIUM SERPL-MCNC: 8.9 MG/DL (ref 8.8–10.2)
CHLORIDE SERPL-SCNC: 103 MMOL/L (ref 98–107)
CREAT SERPL-MCNC: 0.67 MG/DL (ref 0.67–1.17)
DEPRECATED HCO3 PLAS-SCNC: 24 MMOL/L (ref 22–29)
EGFRCR SERPLBLD CKD-EPI 2021: >90 ML/MIN/1.73M2
EOSINOPHIL # BLD AUTO: 0 10E3/UL (ref 0–0.7)
EOSINOPHIL NFR BLD AUTO: 0 %
ERYTHROCYTE [DISTWIDTH] IN BLOOD BY AUTOMATED COUNT: 11.8 % (ref 10–15)
GLUCOSE SERPL-MCNC: 118 MG/DL (ref 70–99)
GLUCOSE SERPL-MCNC: 118 MG/DL (ref 70–99)
HCT VFR BLD AUTO: 37.7 % (ref 40–53)
HGB BLD-MCNC: 13.8 G/DL (ref 13.3–17.7)
IMM GRANULOCYTES # BLD: 0 10E3/UL
IMM GRANULOCYTES NFR BLD: 0 %
LYMPHOCYTES # BLD AUTO: 1.8 10E3/UL (ref 0.8–5.3)
LYMPHOCYTES NFR BLD AUTO: 16 %
MAGNESIUM SERPL-MCNC: 2 MG/DL (ref 1.7–2.3)
MCH RBC QN AUTO: 34 PG (ref 26.5–33)
MCHC RBC AUTO-ENTMCNC: 36.6 G/DL (ref 31.5–36.5)
MCV RBC AUTO: 93 FL (ref 78–100)
MONOCYTES # BLD AUTO: 1.2 10E3/UL (ref 0–1.3)
MONOCYTES NFR BLD AUTO: 11 %
NEUTROPHILS # BLD AUTO: 7.9 10E3/UL (ref 1.6–8.3)
NEUTROPHILS NFR BLD AUTO: 73 %
NRBC # BLD AUTO: 0 10E3/UL
NRBC BLD AUTO-RTO: 0 /100
PHOSPHATE SERPL-MCNC: 2.7 MG/DL (ref 2.5–4.5)
PLATELET # BLD AUTO: 196 10E3/UL (ref 150–450)
POTASSIUM SERPL-SCNC: 3.5 MMOL/L (ref 3.4–5.3)
RBC # BLD AUTO: 4.06 10E6/UL (ref 4.4–5.9)
SODIUM SERPL-SCNC: 137 MMOL/L (ref 135–145)
WBC # BLD AUTO: 10.9 10E3/UL (ref 4–11)

## 2023-11-17 PROCEDURE — 85025 COMPLETE CBC W/AUTO DIFF WBC: CPT | Performed by: SURGERY

## 2023-11-17 PROCEDURE — 250N000013 HC RX MED GY IP 250 OP 250 PS 637: Performed by: SURGERY

## 2023-11-17 PROCEDURE — 83735 ASSAY OF MAGNESIUM: CPT | Performed by: SURGERY

## 2023-11-17 PROCEDURE — 97116 GAIT TRAINING THERAPY: CPT | Mod: GP

## 2023-11-17 PROCEDURE — 250N000011 HC RX IP 250 OP 636: Mod: JZ | Performed by: SURGERY

## 2023-11-17 PROCEDURE — 120N000001 HC R&B MED SURG/OB

## 2023-11-17 PROCEDURE — 84100 ASSAY OF PHOSPHORUS: CPT | Performed by: SURGERY

## 2023-11-17 PROCEDURE — 97161 PT EVAL LOW COMPLEX 20 MIN: CPT | Mod: GP

## 2023-11-17 PROCEDURE — 80048 BASIC METABOLIC PNL TOTAL CA: CPT | Performed by: SURGERY

## 2023-11-17 PROCEDURE — 258N000003 HC RX IP 258 OP 636: Performed by: SURGERY

## 2023-11-17 PROCEDURE — 36415 COLL VENOUS BLD VENIPUNCTURE: CPT | Performed by: SURGERY

## 2023-11-17 RX ORDER — SIMETHICONE 80 MG
80 TABLET,CHEWABLE ORAL 4 TIMES DAILY
Status: DISCONTINUED | OUTPATIENT
Start: 2023-11-17 | End: 2023-11-22 | Stop reason: HOSPADM

## 2023-11-17 RX ADMIN — FAMOTIDINE 20 MG: 20 TABLET ORAL at 09:50

## 2023-11-17 RX ADMIN — SODIUM CHLORIDE, POTASSIUM CHLORIDE, SODIUM LACTATE AND CALCIUM CHLORIDE: 600; 310; 30; 20 INJECTION, SOLUTION INTRAVENOUS at 03:32

## 2023-11-17 RX ADMIN — ACETAMINOPHEN 975 MG: 325 TABLET, FILM COATED ORAL at 18:25

## 2023-11-17 RX ADMIN — SODIUM CHLORIDE, POTASSIUM CHLORIDE, SODIUM LACTATE AND CALCIUM CHLORIDE: 600; 310; 30; 20 INJECTION, SOLUTION INTRAVENOUS at 03:33

## 2023-11-17 RX ADMIN — SODIUM CHLORIDE, POTASSIUM CHLORIDE, SODIUM LACTATE AND CALCIUM CHLORIDE: 600; 310; 30; 20 INJECTION, SOLUTION INTRAVENOUS at 13:12

## 2023-11-17 RX ADMIN — ACETAMINOPHEN 975 MG: 325 TABLET, FILM COATED ORAL at 03:31

## 2023-11-17 RX ADMIN — SENNOSIDES AND DOCUSATE SODIUM 1 TABLET: 8.6; 5 TABLET ORAL at 20:52

## 2023-11-17 RX ADMIN — SIMETHICONE 80 MG: 80 TABLET, CHEWABLE ORAL at 18:25

## 2023-11-17 RX ADMIN — SENNOSIDES AND DOCUSATE SODIUM 1 TABLET: 8.6; 5 TABLET ORAL at 09:49

## 2023-11-17 RX ADMIN — SIMETHICONE 80 MG: 80 TABLET, CHEWABLE ORAL at 20:52

## 2023-11-17 RX ADMIN — SIMETHICONE 80 MG: 80 TABLET, CHEWABLE ORAL at 13:33

## 2023-11-17 RX ADMIN — ENOXAPARIN SODIUM 40 MG: 40 INJECTION SUBCUTANEOUS at 09:49

## 2023-11-17 RX ADMIN — ACETAMINOPHEN 975 MG: 325 TABLET, FILM COATED ORAL at 09:50

## 2023-11-17 RX ADMIN — POLYETHYLENE GLYCOL 3350 17 G: 17 POWDER, FOR SOLUTION ORAL at 09:49

## 2023-11-17 RX ADMIN — FAMOTIDINE 20 MG: 20 TABLET ORAL at 20:52

## 2023-11-17 RX ADMIN — SODIUM CHLORIDE, POTASSIUM CHLORIDE, SODIUM LACTATE AND CALCIUM CHLORIDE: 600; 310; 30; 20 INJECTION, SOLUTION INTRAVENOUS at 20:53

## 2023-11-17 ASSESSMENT — ACTIVITIES OF DAILY LIVING (ADL)
ADLS_ACUITY_SCORE: 23
ADLS_ACUITY_SCORE: 26
ADLS_ACUITY_SCORE: 23
ADLS_ACUITY_SCORE: 26
ADLS_ACUITY_SCORE: 22
ADLS_ACUITY_SCORE: 23
ADLS_ACUITY_SCORE: 26
ADLS_ACUITY_SCORE: 23
ADLS_ACUITY_SCORE: 22
ADLS_ACUITY_SCORE: 23

## 2023-11-17 NOTE — PLAN OF CARE
Pt intermittently confused but alert. Has complaints of pain rated 2-3/10. Receiving scheduled scheduled tylenol. BS active, per patient passing flatus this am, this afternoon pt denies passing flatus. No BM this shift. Abdominal dressing remains in place, shadowing on dressing marked. No changes this shift. Continues to have minimal bleeding from penis. He did void this am, not hat in toilet to collect amount. Pt did void x2 this shift. Up ambulating halls via SBA w/ gait belt. IV continues to infuse LR at 100mL/hr. Family at bedside throughout shift. Alarms active, call light within reach.    Face to face report given with opportunity to observe patient.    Report given to Diane Watson RN   11/17/2023  6:59 PM

## 2023-11-17 NOTE — PROGRESS NOTES
11/17/23 1500   Appointment Info   Signing Clinician's Name / Credentials (PT) Bandar Valencia DPT   Rehab Comments (PT) Pt awake up in chair upon approach and was agreeable to PT eval.   Living Environment   People in Home spouse;child(marlon), adult   Current Living Arrangements house   Home Accessibility stairs to enter home   Number of Stairs, Main Entrance 3   Stair Railings, Main Entrance railing on right side (ascending)   Transportation Anticipated car, drives self;family or friend will provide   Living Environment Comments Pt lives in a 1 story home with basement - lives with wife and adult daughter.  3 WES and laundry and wood stove are in the basement, but pt does not need to access immediately.  Bathroom has a tub/shower combo and comfort height toilet.   Self-Care   Usual Activity Tolerance good   Current Activity Tolerance moderate   Equipment Currently Used at Home none   Fall history within last six months no   Activity/Exercise/Self-Care Comment Pt reports at baseline he is independent for all mobilty w/o a device as well as independent with all ADLs and IADLs including driving and hauled wood up and down the basement.   General Information   Onset of Illness/Injury or Date of Surgery 11/16/23   Referring Physician Dr. Barakat   Patient/Family Therapy Goals Statement (PT) Return home as soon as possible.   Pertinent History of Current Problem (include personal factors and/or comorbidities that impact the POC) Pt underwent hand-assisted laparoscopic right hemicolectomy yesterday for colon polyp in cecum.   Existing Precautions/Restrictions abdominal   Cognition   Affect/Mental Status (Cognition) WFL   Orientation Status (Cognition) oriented x 4   Follows Commands (Cognition) WFL   Safety Deficit (Cognition) impulsivity;minimal deficit   Cognitive Status Comments Pt min impulsive about getting up before instructed by PT.   Pain Assessment   Patient Currently in Pain No  (Pt denied pain)    Integumentary/Edema   Integumentary/Edema Comments Pt with mod abdominal distension and post-op abdominal dressings in place.   Posture    Posture Forward head position   Range of Motion (ROM)   ROM Comment ROM WFL throghout except L shoulder AROM mildly limited d/t arthritis.   Strength (Manual Muscle Testing)   Strength (Manual Muscle Testing) Deficits observed during functional mobility   Strength Comments Minimal deficit   Bed Mobility   Comment, (Bed Mobility) Not tested - pt already up in chair.   Transfers   Transfers sit-stand transfer   Sit-Stand Transfer   Sit-Stand Bertie (Transfers) supervision   Assistive Device (Sit-Stand Transfers)   (None)   Comment, (Sit-Stand Transfer) Pt moved sit<->stand from recliner x 2 and from w/c each SPV, but min impulsively.   Gait/Stairs (Locomotion)   Bertie Level (Gait) supervision  (SBA)   Assistive Device (Gait)   (None)   Distance in Feet (Gait) 110   Pattern (Gait) step-through   Negotiation (Stairs) stairs independence;handrail location;number of steps;ascending technique;descending technique   Bertie Level (Stairs) supervision   Handrail Location (Stairs) both sides   Number of Steps (Stairs) 4   Ascending Technique (Stairs) step-over-step   Descending Technique (Stairs) step-over-step   Comment, (Gait/Stairs) Pt amb approx 110 ft from room down to small rehab gym SBA with brisk pace while pushing IV pole.  Pt demonstrated occasional min instabilty and 1 small stagger, but did self-correct eahc time.  (On RA, Spo2 = 94%, HR = 91 after amb + stairs.)   Balance   Balance Comments Sitting balance = WNL.  Tinetti = 24/28 = low fall risk.   Sensory Examination   Sensory Perception patient reports no sensory changes   Coordination   Coordination no deficits were identified   Muscle Tone   Muscle Tone no deficits were identified   Clinical Impression   Criteria for Skilled Therapeutic Intervention Yes, treatment indicated   PT Diagnosis (PT) Gait  disturbance.   Influenced by the following impairments Min LE functional weakness, balance impairment   Functional limitations due to impairments Gait, stairs   Clinical Presentation (PT Evaluation Complexity) stable   Clinical Presentation Rationale Clinical judgment   Clinical Decision Making (Complexity) low complexity   Planned Therapy Interventions (PT) balance training;bed mobility training;gait training;neuromuscular re-education;patient/family education;stair training;strengthening;transfer training;progressive activity/exercise   Risk & Benefits of therapy have been explained evaluation/treatment results reviewed;care plan/treatment goals reviewed;risks/benefits reviewed;current/potential barriers reviewed;participants voiced agreement with care plan;participants included;patient   Clinical Impression Comments Pt is a 72 y/o male s/p laparoscopic R hemicolectomy yesterday.  He presents with min/mild impairment of gait, balance and LE strength and would benefit from skilled PT services while hospitalized to help progress toward PLOF.   PT Total Evaluation Time   PT Eval, Low Complexity Minutes (94418) 15   Physical Therapy Goals   PT Frequency 6x/week   PT Predicted Duration/Target Date for Goal Attainment 11/24/23   PT Goals Transfers;Gait;Stairs   PT: Transfers Independent   PT: Gait Independent;150 feet   PT: Stairs Modified independent;4 stairs;Rail on right   Interventions   Interventions Quick Adds Gait Training   Gait Training   Gait Training Minutes (88977) 8   Symptoms Noted During/After Treatment (Gait Training) none   Treatment Detail/Skilled Intervention Pt amb approx 110 ft back to his room also SBA pushing IV pole and this time demosntrated min improved balance and did well managing IV pole.  Upon returning to his room he turned around and sat down SBA with good alignment.  Ended visit with pt sitting up in chair, clip alert on and call button in easy reach.  Educated pt on how walking can help  stimulate bowels and RN stated they have already been trying to get pt up to walk some.   PT Discharge Planning   PT Plan Progress gait, balance, and attempt bed mobility if pt has any concerns.   PT Discharge Recommendation (DC Rec) home with assist   PT Rationale for DC Rec Pt appears safe to D/C home with family assist as needed and anticipate pt will return to his PLOF as he continues to medically improve from his surgery yesterday.   PT Brief overview of current status Sit<->stand = SPV; amb x 110 ft SBA with IV pole; stairs x4 = SPV with bilat railings.   PT Equipment Needed at Discharge   (None needed)   Total Session Time   Timed Code Treatment Minutes 8   Total Session Time (sum of timed and untimed services) 23   Psychosocial Support   Trust Relationship/Rapport care explained;choices provided;questions answered

## 2023-11-17 NOTE — PROGRESS NOTES
Assessment completed with Graham.    LOC: alert     Dx: Adenomatous polyp of ascending colon   Chronic Disease Management: ETOH dependence, tobacco disorder, Dyslipidemia     Lives with: spouse, 2 dogs and a cat  Living at:  [facility] [place] [facility]  Transportation: YES, drives self, spouse does not drive    Primary PCP: Di Orr  Insurance:  [employer] Medicare    Support System:  family and friend  Homecare/PCA: no  /County Services:   no  : NO      How was the [facility] notification completed: n/a    Health Care Directive: no and does not want info  Guardian: no  POA: no    Pharmacy: [facility]  Meds management: manages own    Adequate Resources for needs (housing, utilities, food/med): YES  Household chores: shared  Work/community/social activity: YES, gets out as desired    ADLs: independent  Ambulation:independent  Falls: no  Nutrition: no concern  Sleep: sleeps well    Equipment used: none      Oxygen supplier: n/a      Does the supplier have valid oxygen orders: n/a    Mental health: denies  Substance abuse: ETOH and tobacco  Exposure to violence/abuse: denies  Stressors: denies    Able to Return to Prior Living Arrangements: YES    Choice of Vendor: n/a    Barriers: none identified    [place]: low    Plan: home with family providing transportation.    Di Graf CM

## 2023-11-17 NOTE — PLAN OF CARE
"Reason for hospital stay:  Laparoscopic Right Hemicolectomy    Most recent vitals: /65 (BP Location: Right arm, Patient Position: Supine, Cuff Size: Adult Regular)   Pulse 70   Temp 98  F (36.7  C) (Tympanic)   Resp 20   Ht 1.727 m (5' 8\")   Wt 81.3 kg (179 lb 3.7 oz)   SpO2 94%   BMI 27.25 kg/m        Patient A+O but forgetful at times and impulsive and forgets to use call light. Did pull IV and harrison out last night at approximately 0130. Much bright red bleeding from urethra after harrison pulled out and complaints of bladder urgency. Patient moved to room across from desk for closer visualization. Rates abdominal pain 3/10 - originally declined pain medications but then at HS did request a pain pill - gave tylenol and oxy with effective pain management. Patient declined offers of cold packs. Tolerating clear liquids. Bowel sounds present x4 quadrants and passing flatus. Abdominal midline incision with small amount drainage that remains within marked borders. Abdominal trochar site dressings x2 remain clean, dry, and intact. Transfers with SBA and gait belt with unsteady gait at times. Ambulated in room to bathroom.     New IV access obtained and infusing LR @ 100 mL/hr.       Face to face report given with opportunity to observe patient.    Report given to Shaye Amato RN   11/17/2023  7:30 AM        "

## 2023-11-17 NOTE — PROGRESS NOTES
INPATIENT POSTOP PROGRESS NOTE  11/17/2023    Patient: Christopher Valentine    Admitting diagnosis: Polyp of large intestine [K63.5]  Adenomatous polyp of ascending colon [D12.2]    Procedure(s):  hand-assisted laparoscopic right hemicolectomy     POD: 1 Day Post-Op    Current Diet: Clear liquids    CURRENT MEDICATIONS:  Continuous Medications:  Current Facility-Administered Medications   Medication Last Rate    lactated ringers Stopped (11/16/23 0900)    lactated ringers 100 mL/hr at 11/17/23 0333       Scheduled Medications:  Current Facility-Administered Medications   Medication Dose Route Frequency    acetaminophen  975 mg Oral Q8H    ceFAZolin  2 g Intravenous See Admin Instructions    enoxaparin ANTICOAGULANT  40 mg Subcutaneous Q24H    famotidine  20 mg Oral BID    Or    famotidine  20 mg Intravenous BID    polyethylene glycol  17 g Oral Daily    scopolamine   Transdermal Q8H CAROLE    senna-docusate  1 tablet Oral BID    sodium chloride (PF)  3 mL Intracatheter Q8H    sodium chloride (PF)  3 mL Intracatheter Q8H       PRN Medications:  Current Facility-Administered Medications   Medication Dose Route Frequency    [START ON 11/19/2023] acetaminophen  650 mg Oral Q4H PRN    bisacodyl  10 mg Rectal Daily PRN    diazepam  5-10 mg Intravenous Q6H PRN    HYDROmorphone  0.2 mg Intravenous Q2H PRN    Or    HYDROmorphone  0.4 mg Intravenous Q2H PRN    lidocaine 4%   Topical Q1H PRN    lidocaine (buffered or not buffered)  0.1-1 mL Other Q1H PRN    magnesium hydroxide  30 mL Oral Daily PRN    naloxone  0.2 mg Intravenous Q2 Min PRN    Or    naloxone  0.4 mg Intravenous Q2 Min PRN    Or    naloxone  0.2 mg Intramuscular Q2 Min PRN    Or    naloxone  0.4 mg Intramuscular Q2 Min PRN    ondansetron  4 mg Oral Q6H PRN    Or    ondansetron  4 mg Intravenous Q6H PRN    oxyCODONE  5 mg Oral Q4H PRN    Or    oxyCODONE  10 mg Oral Q4H PRN    prochlorperazine  5 mg Intravenous Q6H PRN    Or    prochlorperazine  5 mg Oral Q6H PRN     "sodium chloride (PF)  3 mL Intracatheter q1 min prn    sodium chloride (PF)  3 mL Intracatheter q1 min prn       SUBJECTIVE:   Patient ripped harrison out last night--had a lot of bleeding and clot--penis is a little sore this am; has not passed flatus; not moving around a lot    PHYSICAL EXAM:   Vital signs: /77 (BP Location: Right arm, Patient Position: Chair, Cuff Size: Adult Regular)   Pulse 77   Temp 97.9  F (36.6  C) (Tympanic)   Resp 20   Ht 1.727 m (5' 8\")   Wt 81.3 kg (179 lb 3.7 oz)   SpO2 95%   BMI 27.25 kg/m     General: in no acute distress   HEENT: EOMI   Neck: supple   Lungs: respirations unlabored   CV: Regular rate and rhythm without murmer   Abdominal: mild to moderate distention; appropriate incisional tenderness   Wound:  dressings intact   Penis:  small amount of ecchymosis near meatus; no significant amt of bleeding seen   Extremities: no edema, redness or tenderness in the calves or thighs   Neurological: without deficit    INPUT/OUTPUT:      Intake/Output Summary (Last 24 hours) at 11/17/2023 1157  Last data filed at 11/17/2023 0900  Gross per 24 hour   Intake 2699 ml   Output 2425 ml   Net 274 ml       I/O last 3 completed shifts:  In: 3299 [P.O.:360; I.V.:2939]  Out: 2555 [Urine:2555]    LABS:    Last CBC Rrsults:   Recent Labs   Lab Test 11/17/23  0518 11/09/23  1107 07/20/23  0822   WBC 10.9 6.0 5.4   RBC 4.06* 4.79 4.64   HGB 13.8 16.1 15.5   HCT 37.7* 44.7 44.1   MCV 93 93 95   MCH 34.0* 33.6* 33.4*   MCHC 36.6* 36.0 35.1   RDW 11.8 11.9 12.2    235 239       Last Comprehensive Metabolic panel:  Recent Labs   Lab Test 11/17/23  0518 11/16/23  1125 11/16/23  0645 11/09/23  1107 07/20/23  0822 12/01/21  1404 12/01/21  1404     --   --  136 137  --  134   POTASSIUM 3.5  --   --  4.0 3.9  --  3.8   CHLORIDE 103  --   --  102 101  --  103   CO2 24  --   --  23 23  --  24   ANIONGAP 10  --   --  11 13  --  7   *  118* 136* 111* 88 84   < > 103*   BUN 4.4*  --   " --  7.6* 7.1*  --  9   CR 0.67  --   --  0.68 0.69  --  0.78   GFRESTIMATED >90  --   --  >90 >90  --  >90   TOBIAS 8.9  --   --  9.2 9.4  --  8.9   BILITOTAL  --   --   --  1.1 1.0  --  1.2   ALKPHOS  --   --   --  92 101  --  99   ALT  --   --   --  24 25  --  30   AST  --   --   --  29 35  --  35    < > = values in this interval not displayed.       Recent Labs   Lab Test 11/17/23  0518 11/09/23  1107 07/20/23  0822 12/01/21  1404   MAG 2.0  --   --   --    ALBUMIN  --  4.2 4.0 3.8   PHOS 2.7  --   --   --            ASSESSMENT:    1 Day Post-Op from Procedure(s):  hand-assisted laparoscopic right hemicolectomy.     Stable; awaiting return of bowel function    PLAN:     Encourage ambulation; order PT  Encourage ICS  Clear liquid diet  Will keep an eye of the bleeding from his penis--it is slowing down and not much coming out; will not hold Lovenox--don't think he is actively bleeding  Simethicone  Will keep harrison out for now unless he has urinary retention

## 2023-11-17 NOTE — PLAN OF CARE
Pt pulled out harrison catheter with balloon fully intact and also pulled out IV. Will move from 3228 to 3108 for pt safety to be more closely monitored.

## 2023-11-18 ENCOUNTER — APPOINTMENT (OUTPATIENT)
Dept: PHYSICAL THERAPY | Facility: HOSPITAL | Age: 73
DRG: 331 | End: 2023-11-18
Attending: SURGERY
Payer: COMMERCIAL

## 2023-11-18 LAB
ANION GAP SERPL CALCULATED.3IONS-SCNC: 11 MMOL/L (ref 7–15)
BASOPHILS # BLD AUTO: 0 10E3/UL (ref 0–0.2)
BASOPHILS NFR BLD AUTO: 0 %
BUN SERPL-MCNC: 3.2 MG/DL (ref 8–23)
CALCIUM SERPL-MCNC: 8.7 MG/DL (ref 8.8–10.2)
CHLORIDE SERPL-SCNC: 97 MMOL/L (ref 98–107)
CREAT SERPL-MCNC: 0.57 MG/DL (ref 0.67–1.17)
DEPRECATED HCO3 PLAS-SCNC: 25 MMOL/L (ref 22–29)
EGFRCR SERPLBLD CKD-EPI 2021: >90 ML/MIN/1.73M2
EOSINOPHIL # BLD AUTO: 0 10E3/UL (ref 0–0.7)
EOSINOPHIL NFR BLD AUTO: 0 %
ERYTHROCYTE [DISTWIDTH] IN BLOOD BY AUTOMATED COUNT: 11.7 % (ref 10–15)
GLUCOSE SERPL-MCNC: 98 MG/DL (ref 70–99)
GLUCOSE SERPL-MCNC: 98 MG/DL (ref 70–99)
HCT VFR BLD AUTO: 38.5 % (ref 40–53)
HGB BLD-MCNC: 14 G/DL (ref 13.3–17.7)
IMM GRANULOCYTES # BLD: 0 10E3/UL
IMM GRANULOCYTES NFR BLD: 0 %
LYMPHOCYTES # BLD AUTO: 2 10E3/UL (ref 0.8–5.3)
LYMPHOCYTES NFR BLD AUTO: 20 %
MAGNESIUM SERPL-MCNC: 1.7 MG/DL (ref 1.7–2.3)
MCH RBC QN AUTO: 33.3 PG (ref 26.5–33)
MCHC RBC AUTO-ENTMCNC: 36.4 G/DL (ref 31.5–36.5)
MCV RBC AUTO: 91 FL (ref 78–100)
MONOCYTES # BLD AUTO: 0.9 10E3/UL (ref 0–1.3)
MONOCYTES NFR BLD AUTO: 9 %
NEUTROPHILS # BLD AUTO: 6.8 10E3/UL (ref 1.6–8.3)
NEUTROPHILS NFR BLD AUTO: 71 %
NRBC # BLD AUTO: 0 10E3/UL
NRBC BLD AUTO-RTO: 0 /100
PHOSPHATE SERPL-MCNC: 2.8 MG/DL (ref 2.5–4.5)
PLATELET # BLD AUTO: 190 10E3/UL (ref 150–450)
POTASSIUM SERPL-SCNC: 3.1 MMOL/L (ref 3.4–5.3)
POTASSIUM SERPL-SCNC: 3.2 MMOL/L (ref 3.4–5.3)
RBC # BLD AUTO: 4.21 10E6/UL (ref 4.4–5.9)
SODIUM SERPL-SCNC: 133 MMOL/L (ref 135–145)
WBC # BLD AUTO: 9.7 10E3/UL (ref 4–11)

## 2023-11-18 PROCEDURE — 250N000011 HC RX IP 250 OP 636: Mod: JZ | Performed by: SURGERY

## 2023-11-18 PROCEDURE — 250N000013 HC RX MED GY IP 250 OP 250 PS 637: Performed by: SURGERY

## 2023-11-18 PROCEDURE — 84132 ASSAY OF SERUM POTASSIUM: CPT | Performed by: SURGERY

## 2023-11-18 PROCEDURE — 84100 ASSAY OF PHOSPHORUS: CPT | Performed by: SURGERY

## 2023-11-18 PROCEDURE — 258N000003 HC RX IP 258 OP 636: Performed by: SURGERY

## 2023-11-18 PROCEDURE — 83735 ASSAY OF MAGNESIUM: CPT | Performed by: SURGERY

## 2023-11-18 PROCEDURE — 999N000157 HC STATISTIC RCP TIME EA 10 MIN

## 2023-11-18 PROCEDURE — 80048 BASIC METABOLIC PNL TOTAL CA: CPT | Performed by: SURGERY

## 2023-11-18 PROCEDURE — 97530 THERAPEUTIC ACTIVITIES: CPT | Mod: GP

## 2023-11-18 PROCEDURE — 36415 COLL VENOUS BLD VENIPUNCTURE: CPT | Performed by: SURGERY

## 2023-11-18 PROCEDURE — 85014 HEMATOCRIT: CPT | Performed by: SURGERY

## 2023-11-18 PROCEDURE — 120N000001 HC R&B MED SURG/OB

## 2023-11-18 RX ORDER — CALCIUM CARBONATE 500 MG/1
500 TABLET, CHEWABLE ORAL DAILY PRN
Status: DISCONTINUED | OUTPATIENT
Start: 2023-11-18 | End: 2023-11-22 | Stop reason: HOSPADM

## 2023-11-18 RX ORDER — POTASSIUM CHLORIDE 1500 MG/1
40 TABLET, EXTENDED RELEASE ORAL ONCE
Status: COMPLETED | OUTPATIENT
Start: 2023-11-18 | End: 2023-11-18

## 2023-11-18 RX ADMIN — ACETAMINOPHEN 975 MG: 325 TABLET, FILM COATED ORAL at 02:22

## 2023-11-18 RX ADMIN — POTASSIUM CHLORIDE 40 MEQ: 1500 TABLET, EXTENDED RELEASE ORAL at 15:43

## 2023-11-18 RX ADMIN — FAMOTIDINE 20 MG: 20 TABLET ORAL at 08:43

## 2023-11-18 RX ADMIN — ONDANSETRON 4 MG: 2 INJECTION INTRAMUSCULAR; INTRAVENOUS at 17:10

## 2023-11-18 RX ADMIN — SIMETHICONE 80 MG: 80 TABLET, CHEWABLE ORAL at 08:43

## 2023-11-18 RX ADMIN — SIMETHICONE 80 MG: 80 TABLET, CHEWABLE ORAL at 13:14

## 2023-11-18 RX ADMIN — SENNOSIDES AND DOCUSATE SODIUM 1 TABLET: 8.6; 5 TABLET ORAL at 08:43

## 2023-11-18 RX ADMIN — PROCHLORPERAZINE EDISYLATE 5 MG: 5 INJECTION INTRAMUSCULAR; INTRAVENOUS at 21:05

## 2023-11-18 RX ADMIN — POLYETHYLENE GLYCOL 3350 17 G: 17 POWDER, FOR SOLUTION ORAL at 08:44

## 2023-11-18 RX ADMIN — ENOXAPARIN SODIUM 40 MG: 40 INJECTION SUBCUTANEOUS at 08:43

## 2023-11-18 RX ADMIN — ACETAMINOPHEN 975 MG: 325 TABLET, FILM COATED ORAL at 09:59

## 2023-11-18 RX ADMIN — CALCIUM CARBONATE 500 MG: 500 TABLET, CHEWABLE ORAL at 15:42

## 2023-11-18 RX ADMIN — SODIUM CHLORIDE, POTASSIUM CHLORIDE, SODIUM LACTATE AND CALCIUM CHLORIDE: 600; 310; 30; 20 INJECTION, SOLUTION INTRAVENOUS at 06:31

## 2023-11-18 ASSESSMENT — ACTIVITIES OF DAILY LIVING (ADL)
ADLS_ACUITY_SCORE: 21
ADLS_ACUITY_SCORE: 21
ADLS_ACUITY_SCORE: 23
ADLS_ACUITY_SCORE: 21
ADLS_ACUITY_SCORE: 23
ADLS_ACUITY_SCORE: 21
ADLS_ACUITY_SCORE: 21
ADLS_ACUITY_SCORE: 23
ADLS_ACUITY_SCORE: 23

## 2023-11-18 NOTE — PROGRESS NOTES
11/18/23 3053   Appointment Info   Signing Clinician's Name / Credentials (PT) Sofi Stacy DPT (Tom)   Interventions   Interventions Quick Adds Therapeutic Activity   Therapeutic Activity   Therapeutic Activities: dynamic activities to improve functional performance Minutes (27174) 20   Symptoms Noted During/After Treatment None   Treatment Detail/Skilled Intervention Pt seated in recliner watching TV upon PT arrival to room for session; Pt in good spirits and agreeable with appropriate, sarcastic humor; Pt walked 250' pushing own IV pole with SPV and gait belt (care to not place over surgical site); Pt in PT gym for 4x rounds of stairs with minimal UE railing use; Pt returned to room 250' with SPV; Pt encouraged to perform 20x heel<>toe risers hourly; Pt left with call alarm attached and call light in reach   PT Discharge Planning   PT Plan Progress gait, balance, and attempt bed mobility if pt has any concerns.   PT Discharge Recommendation (DC Rec) home with assist   PT Rationale for DC Rec Pt appears safe to D/C home with family assist as needed and anticipate pt will return to his PLOF as he continues to medically improve from his surgery yesterday.   PT Brief overview of current status 500ft with SPV; MOD-I stairs for handrail use; SPV for transfers   Total Session Time   Timed Code Treatment Minutes 20   Total Session Time (sum of timed and untimed services) 20

## 2023-11-18 NOTE — PLAN OF CARE
Assumed care of this pt at 1900. Assess as charted. IVF: LR @ 100/hr. No confusion noted these past 4 hours. Takes po meds without diff. Surg dressing intact.       Face to face report given with opportunity to observe patient.    Report given to Lucinda Garcia RN   11/17/2023  11:20 PM

## 2023-11-18 NOTE — PROGRESS NOTES
INPATIENT POSTOP PROGRESS NOTE  11/18/2023    Patient: Christopher Valentine    Admitting diagnosis: Polyp of large intestine [K63.5]  Adenomatous polyp of ascending colon [D12.2]    Procedure(s):  hand-assisted laparoscopic right hemicolectomy     POD: 2 Days Post-Op    Current Diet: Clear liquids    CURRENT MEDICATIONS:  Continuous Medications:  Current Facility-Administered Medications   Medication Last Rate    lactated ringers 100 mL/hr at 11/18/23 0830    lactated ringers 100 mL/hr at 11/18/23 0631       Scheduled Medications:  Current Facility-Administered Medications   Medication Dose Route Frequency    acetaminophen  975 mg Oral Q8H    ceFAZolin  2 g Intravenous See Admin Instructions    enoxaparin ANTICOAGULANT  40 mg Subcutaneous Q24H    famotidine  20 mg Oral BID    Or    famotidine  20 mg Intravenous BID    polyethylene glycol  17 g Oral Daily    potassium chloride  40 mEq Oral Once    scopolamine   Transdermal Q8H CAROLE    senna-docusate  1 tablet Oral BID    simethicone  80 mg Oral 4x Daily    sodium chloride (PF)  3 mL Intracatheter Q8H    sodium chloride (PF)  3 mL Intracatheter Q8H       PRN Medications:  Current Facility-Administered Medications   Medication Dose Route Frequency    [START ON 11/19/2023] acetaminophen  650 mg Oral Q4H PRN    bisacodyl  10 mg Rectal Daily PRN    diazepam  5-10 mg Intravenous Q6H PRN    HYDROmorphone  0.2 mg Intravenous Q2H PRN    Or    HYDROmorphone  0.4 mg Intravenous Q2H PRN    lidocaine 4%   Topical Q1H PRN    lidocaine (buffered or not buffered)  0.1-1 mL Other Q1H PRN    magnesium hydroxide  30 mL Oral Daily PRN    naloxone  0.2 mg Intravenous Q2 Min PRN    Or    naloxone  0.4 mg Intravenous Q2 Min PRN    Or    naloxone  0.2 mg Intramuscular Q2 Min PRN    Or    naloxone  0.4 mg Intramuscular Q2 Min PRN    ondansetron  4 mg Oral Q6H PRN    Or    ondansetron  4 mg Intravenous Q6H PRN    oxyCODONE  5 mg Oral Q4H PRN    Or    oxyCODONE  10 mg Oral Q4H PRN     "prochlorperazine  5 mg Intravenous Q6H PRN    Or    prochlorperazine  5 mg Oral Q6H PRN    sodium chloride (PF)  3 mL Intracatheter q1 min prn    sodium chloride (PF)  3 mL Intracatheter q1 min prn       SUBJECTIVE:   Patient tolerating clears; passing flatus; had  a large bm; pain controlled with tylenol    PHYSICAL EXAM:   Vital signs: /91 (BP Location: Left arm, Patient Position: Chair, Cuff Size: Adult Regular)   Pulse 97   Temp 98.1  F (36.7  C) (Tympanic)   Resp 18   Ht 1.727 m (5' 8\")   Wt 81.3 kg (179 lb 3.7 oz)   SpO2 95%   BMI 27.25 kg/m     General: in no acute distress   HEENT: EOMI and Sclera clear, anicteric   Neck: supple   Lungs: respirations unlabored   CV: Regular rate and rhythm without murmer   Abdominal: Abdomen soft, appropriate incisional tendernes; less distended. BS normal. No masses, organomegaly   Wound:  dressing intact   Penis:  a little bit more ecchymosis but no bleeding noted   Extremities: No cyanosis, clubbing or edema noted bilaterally in Upper and Lower Extremities   Neurological: without deficit    INPUT/OUTPUT:      Intake/Output Summary (Last 24 hours) at 11/18/2023 1523  Last data filed at 11/18/2023 1300  Gross per 24 hour   Intake 2739 ml   Output 3100 ml   Net -361 ml       I/O last 3 completed shifts:  In: 2739 [P.O.:360; I.V.:2379]  Out: 3100 [Urine:2900; Emesis/NG output:200]    LABS:    Last CBC Rrsults:   Recent Labs   Lab Test 11/18/23 0527 11/17/23  0518 11/09/23  1107   WBC 9.7 10.9 6.0   RBC 4.21* 4.06* 4.79   HGB 14.0 13.8 16.1   HCT 38.5* 37.7* 44.7   MCV 91 93 93   MCH 33.3* 34.0* 33.6*   MCHC 36.4 36.6* 36.0   RDW 11.7 11.8 11.9    196 235       Last Comprehensive Metabolic panel:  Recent Labs   Lab Test 11/18/23 0527 11/17/23  0518 11/16/23  1125 11/16/23  0645 11/09/23  1107 07/20/23  0822 12/01/21  1404 12/01/21  1404   * 137  --   --  136 137  --  134   POTASSIUM 3.1* 3.5  --   --  4.0 3.9   < > 3.8   CHLORIDE 97* 103  --   --  " 102 101   < > 103   CO2 25 24  --   --  23 23   < > 24   ANIONGAP 11 10  --   --  11 13   < > 7   GLC 98  98 118*  118* 136*   < > 88 84   < > 103*   BUN 3.2* 4.4*  --   --  7.6* 7.1*   < > 9   CR 0.57* 0.67  --   --  0.68 0.69  --  0.78   GFRESTIMATED >90 >90  --   --  >90 >90  --  >90   TOBIAS 8.7* 8.9  --   --  9.2 9.4  --  8.9   BILITOTAL  --   --   --   --  1.1 1.0  --  1.2   ALKPHOS  --   --   --   --  92 101  --  99   ALT  --   --   --   --  24 25  --  30   AST  --   --   --   --  29 35  --  35    < > = values in this interval not displayed.       Recent Labs   Lab Test 11/18/23  0527 11/17/23  0518 11/09/23  1107 07/20/23  0822 12/01/21  1404   MAG 1.7 2.0  --   --   --    ALBUMIN  --   --  4.2 4.0 3.8   PHOS 2.8 2.7  --   --   --            ASSESSMENT:    2 Days Post-Op from Procedure(s):  hand-assisted laparoscopic right hemicolectomy.    Has return of bowel function    PLAN:     Continue PT  Advance to regular diet  Replace K  Discontinue ivf  TUMS for heartburn  Anticipate discharge tomorrow if he tolerates regular diet

## 2023-11-18 NOTE — PLAN OF CARE
VS as charted. Afebrile. Denies pain.   RA. Sats in the 90's.   GI: Passing flatus, small BM, emesis, heartburn, abdominal fullness, abdominal discomfort & nausea. Pt did feel better after emesis. MD notified of heartburn. IV Zofran given for nausea. Nausea was about an hour after eating. Nausea did resolve of Zofran was given.   Abdominal incision: Aquacell intact, 3 old drainage spots marked & remain in marking.   Diet order changed to regular diet.   Independent. Ambulation at least 3x/day. Pt able to walk the halls by himself as he has a steady gait/A&O.   IV SL.   Call light within reach, use of call light appropriately, & makes needs known.   Face to face report given with opportunity to observe patient.    Report given to JONG Britt RN   11/18/2023  7:44 PM

## 2023-11-18 NOTE — PLAN OF CARE
Pt is A&o x4. Reports mild pain rated 2/10 to abdomen managed with scheduled tylenol. BS are active x4. Abdominal dressing is intact, drainage marked with no change. Tolerating clear liquids. Patient states he has been passing flatus through the night and feels the urge to have a BM. Ambulating with SBA.     IVF infusing LR @ 100ml/hr.     Call light in reach, bed alarm active and audible, and room door open near nurse's station.     Face to face report given with opportunity to observe patient.    Report given to JONG Enriquez RN   11/18/2023  0719 AM

## 2023-11-19 ENCOUNTER — APPOINTMENT (OUTPATIENT)
Dept: GENERAL RADIOLOGY | Facility: HOSPITAL | Age: 73
DRG: 331 | End: 2023-11-19
Attending: SURGERY
Payer: COMMERCIAL

## 2023-11-19 LAB
ANION GAP SERPL CALCULATED.3IONS-SCNC: 15 MMOL/L (ref 7–15)
BASOPHILS # BLD AUTO: 0 10E3/UL (ref 0–0.2)
BASOPHILS NFR BLD AUTO: 0 %
BUN SERPL-MCNC: 9.8 MG/DL (ref 8–23)
CALCIUM SERPL-MCNC: 9.4 MG/DL (ref 8.8–10.2)
CHLORIDE SERPL-SCNC: 91 MMOL/L (ref 98–107)
CREAT SERPL-MCNC: 0.73 MG/DL (ref 0.67–1.17)
DEPRECATED HCO3 PLAS-SCNC: 23 MMOL/L (ref 22–29)
EGFRCR SERPLBLD CKD-EPI 2021: >90 ML/MIN/1.73M2
EOSINOPHIL # BLD AUTO: 0 10E3/UL (ref 0–0.7)
EOSINOPHIL NFR BLD AUTO: 0 %
ERYTHROCYTE [DISTWIDTH] IN BLOOD BY AUTOMATED COUNT: 11.8 % (ref 10–15)
GLUCOSE SERPL-MCNC: 115 MG/DL (ref 70–99)
HCT VFR BLD AUTO: 45 % (ref 40–53)
HGB BLD-MCNC: 16.4 G/DL (ref 13.3–17.7)
IMM GRANULOCYTES # BLD: 0.1 10E3/UL
IMM GRANULOCYTES NFR BLD: 1 %
LYMPHOCYTES # BLD AUTO: 1.3 10E3/UL (ref 0.8–5.3)
LYMPHOCYTES NFR BLD AUTO: 11 %
MAGNESIUM SERPL-MCNC: 1.8 MG/DL (ref 1.7–2.3)
MCH RBC QN AUTO: 32.8 PG (ref 26.5–33)
MCHC RBC AUTO-ENTMCNC: 36.4 G/DL (ref 31.5–36.5)
MCV RBC AUTO: 90 FL (ref 78–100)
MONOCYTES # BLD AUTO: 1.2 10E3/UL (ref 0–1.3)
MONOCYTES NFR BLD AUTO: 9 %
NEUTROPHILS # BLD AUTO: 9.9 10E3/UL (ref 1.6–8.3)
NEUTROPHILS NFR BLD AUTO: 79 %
NRBC # BLD AUTO: 0 10E3/UL
NRBC BLD AUTO-RTO: 0 /100
PHOSPHATE SERPL-MCNC: 4.1 MG/DL (ref 2.5–4.5)
PLATELET # BLD AUTO: 236 10E3/UL (ref 150–450)
POTASSIUM SERPL-SCNC: 3.6 MMOL/L (ref 3.4–5.3)
RBC # BLD AUTO: 5 10E6/UL (ref 4.4–5.9)
SODIUM SERPL-SCNC: 129 MMOL/L (ref 135–145)
WBC # BLD AUTO: 12.5 10E3/UL (ref 4–11)

## 2023-11-19 PROCEDURE — 71045 X-RAY EXAM CHEST 1 VIEW: CPT

## 2023-11-19 PROCEDURE — 85025 COMPLETE CBC W/AUTO DIFF WBC: CPT | Performed by: SURGERY

## 2023-11-19 PROCEDURE — 120N000001 HC R&B MED SURG/OB

## 2023-11-19 PROCEDURE — 83735 ASSAY OF MAGNESIUM: CPT | Performed by: SURGERY

## 2023-11-19 PROCEDURE — 84100 ASSAY OF PHOSPHORUS: CPT | Performed by: SURGERY

## 2023-11-19 PROCEDURE — 250N000013 HC RX MED GY IP 250 OP 250 PS 637: Performed by: SURGERY

## 2023-11-19 PROCEDURE — 74019 RADEX ABDOMEN 2 VIEWS: CPT

## 2023-11-19 PROCEDURE — 36415 COLL VENOUS BLD VENIPUNCTURE: CPT | Performed by: SURGERY

## 2023-11-19 PROCEDURE — 250N000011 HC RX IP 250 OP 636: Mod: JZ | Performed by: SURGERY

## 2023-11-19 PROCEDURE — 80048 BASIC METABOLIC PNL TOTAL CA: CPT | Performed by: SURGERY

## 2023-11-19 RX ADMIN — FAMOTIDINE 20 MG: 10 INJECTION, SOLUTION INTRAVENOUS at 20:32

## 2023-11-19 RX ADMIN — ONDANSETRON 4 MG: 2 INJECTION INTRAMUSCULAR; INTRAVENOUS at 23:13

## 2023-11-19 RX ADMIN — SENNOSIDES AND DOCUSATE SODIUM 1 TABLET: 8.6; 5 TABLET ORAL at 08:41

## 2023-11-19 RX ADMIN — PROCHLORPERAZINE EDISYLATE 5 MG: 5 INJECTION INTRAMUSCULAR; INTRAVENOUS at 20:34

## 2023-11-19 RX ADMIN — SIMETHICONE 80 MG: 80 TABLET, CHEWABLE ORAL at 17:14

## 2023-11-19 RX ADMIN — SIMETHICONE 80 MG: 80 TABLET, CHEWABLE ORAL at 20:32

## 2023-11-19 ASSESSMENT — ACTIVITIES OF DAILY LIVING (ADL)
ADLS_ACUITY_SCORE: 21

## 2023-11-19 NOTE — PROGRESS NOTES
INPATIENT POSTOP PROGRESS NOTE  11/19/2023    Patient: Christopher Valentine    Admitting diagnosis: Polyp of large intestine [K63.5]  Adenomatous polyp of ascending colon [D12.2]    Procedure(s):  hand-assisted laparoscopic right hemicolectomy     POD: 3 Days Post-Op    Current Diet: Regular    CURRENT MEDICATIONS:  Continuous Medications:  Current Facility-Administered Medications   Medication Last Rate       Scheduled Medications:  Current Facility-Administered Medications   Medication Dose Route Frequency    ceFAZolin  2 g Intravenous See Admin Instructions    enoxaparin ANTICOAGULANT  40 mg Subcutaneous Q24H    famotidine  20 mg Oral BID    Or    famotidine  20 mg Intravenous BID    polyethylene glycol  17 g Oral Daily    scopolamine   Transdermal Q8H CAROLE    senna-docusate  1 tablet Oral BID    simethicone  80 mg Oral 4x Daily    sodium chloride (PF)  3 mL Intracatheter Q8H    sodium chloride (PF)  3 mL Intracatheter Q8H       PRN Medications:  Current Facility-Administered Medications   Medication Dose Route Frequency    acetaminophen  650 mg Oral Q4H PRN    bisacodyl  10 mg Rectal Daily PRN    calcium carbonate  500 mg Oral Daily PRN    diazepam  5-10 mg Intravenous Q6H PRN    HYDROmorphone  0.2 mg Intravenous Q2H PRN    Or    HYDROmorphone  0.4 mg Intravenous Q2H PRN    lidocaine 4%   Topical Q1H PRN    lidocaine (buffered or not buffered)  0.1-1 mL Other Q1H PRN    magnesium hydroxide  30 mL Oral Daily PRN    naloxone  0.2 mg Intravenous Q2 Min PRN    Or    naloxone  0.4 mg Intravenous Q2 Min PRN    Or    naloxone  0.2 mg Intramuscular Q2 Min PRN    Or    naloxone  0.4 mg Intramuscular Q2 Min PRN    ondansetron  4 mg Oral Q6H PRN    Or    ondansetron  4 mg Intravenous Q6H PRN    oxyCODONE  5 mg Oral Q4H PRN    Or    oxyCODONE  10 mg Oral Q4H PRN    prochlorperazine  5 mg Intravenous Q6H PRN    Or    prochlorperazine  5 mg Oral Q6H PRN    sodium chloride (PF)  3 mL Intracatheter q1 min prn    sodium chloride  "(PF)  3 mL Intracatheter q1 min prn       SUBJECTIVE:   Patient ambulating; using ICS to 1000.  Complains of being more distended today and having more pressure in lungs.  He had some emesis during a coughing spell.  He is passing wet farts/flatus    PHYSICAL EXAM:   Vital signs: /70 (BP Location: Right arm, Patient Position: Supine, Cuff Size: Adult Regular)   Pulse 98   Temp 98.4  F (36.9  C) (Tympanic)   Resp 20   Ht 1.727 m (5' 8\")   Wt 81.3 kg (179 lb 3.7 oz)   SpO2 (!) 91%   BMI 27.25 kg/m     General: Normal   HEENT: EOMI and Sclera clear, anicteric   Neck: supple   Lungs: clear to auscultation   CV: Regular rate and rhythm without murmer   Abdominal: looks more distended today.  No significant tenderness; higher pidtched bowel sounds   Wound:  dressing intact   Penis:  stable ecchymosis; no bleeding seen   Extremities: No cyanosis, clubbing or edema noted bilaterally in Upper and Lower Extremities   Neurological: without deficit    INPUT/OUTPUT:      Intake/Output Summary (Last 24 hours) at 11/19/2023 1618  Last data filed at 11/19/2023 1047  Gross per 24 hour   Intake 243 ml   Output --   Net 243 ml       I/O last 3 completed shifts:  In: 243 [P.O.:240; I.V.:3]  Out: -     LABS:    Last CBC Rrsults:   Recent Labs   Lab Test 11/19/23  0535 11/18/23  0527 11/17/23  0518   WBC 12.5* 9.7 10.9   RBC 5.00 4.21* 4.06*   HGB 16.4 14.0 13.8   HCT 45.0 38.5* 37.7*   MCV 90 91 93   MCH 32.8 33.3* 34.0*   MCHC 36.4 36.4 36.6*   RDW 11.8 11.7 11.8    190 196       Last Comprehensive Metabolic panel:  Recent Labs   Lab Test 11/19/23  0535 11/18/23  1816 11/18/23  0527 11/17/23  0518 11/16/23  0645 11/09/23  1107 07/20/23  0822 12/01/21  1404 12/01/21  1404   *  --  133* 137  --  136 137  --  134   POTASSIUM 3.6 3.2* 3.1* 3.5  --  4.0 3.9   < > 3.8   CHLORIDE 91*  --  97* 103  --  102 101   < > 103   CO2 23  --  25 24  --  23 23   < > 24   ANIONGAP 15  --  11 10  --  11 13   < > 7   *  --  " 98  98 118*  118*   < > 88 84   < > 103*   BUN 9.8  --  3.2* 4.4*  --  7.6* 7.1*   < > 9   CR 0.73  --  0.57* 0.67  --  0.68 0.69  --  0.78   GFRESTIMATED >90  --  >90 >90  --  >90 >90  --  >90   TOBIAS 9.4  --  8.7* 8.9  --  9.2 9.4  --  8.9   BILITOTAL  --   --   --   --   --  1.1 1.0  --  1.2   ALKPHOS  --   --   --   --   --  92 101  --  99   ALT  --   --   --   --   --  24 25  --  30   AST  --   --   --   --   --  29 35  --  35    < > = values in this interval not displayed.       Recent Labs   Lab Test 11/19/23  0535 11/18/23  0527 11/17/23  0518 11/09/23  1107 07/20/23  0822 12/01/21  1404   MAG 1.8 1.7 2.0  --   --   --    ALBUMIN  --   --   --  4.2 4.0 3.8   PHOS 4.1 2.8 2.7  --   --   --              ASSESSMENT:    3 Days Post-Op from Procedure(s):  hand-assisted laparoscopic right hemicolectomy.     Looks more distended and wbc increased; but no fevers    PLAN:     Will not discharge him today  Will check abdominal and chest xrays to evaluate for ileus and atelectasis  If he has an ileus will make him npo

## 2023-11-19 NOTE — PLAN OF CARE
Assumed cares at 11 AM. Belly does appear quite distended with audible tinkling noted. Emesis x1 that pt reported was from having a coughing fit. Dressings intact with Dr. Barakat updated in person of pt status. Pt has been getting up and ambulating in halls and room. Bed in lowest position. Call light in reach. ID band in place. Makes needs known.     Face to face report given with opportunity to observe patient.    Report given to JONG Acevedo RN   11/19/2023  3:00 PM

## 2023-11-19 NOTE — PLAN OF CARE
VS as charted. Denies pain.   RA. Infrequent cough  Midline abdominal incision: Aqua cell in place, no changes to marked areas.   Abd: No nausea or emesis. Distended, taut, belching, passing some flatus. No BM. Senokot was given.   Refused most of his medications including Miralax   Independent & ambulating in the halls.   No IV access.   Call light within reach, use of call light appropriately, & makes needs known.   Face to face report given with opportunity to observe patient.    Report given to Humphrey CLEARY RN.     Laura Gore RN   11/19/2023  11:33 AM

## 2023-11-19 NOTE — PLAN OF CARE
"/50 (BP Location: Right arm, Cuff Size: Adult Regular)   Pulse 89   Temp 98.8  F (37.1  C) (Tympanic)   Resp 18   Ht 1.727 m (5' 8\")   Wt 81.3 kg (179 lb 3.7 oz)   SpO2 94%   BMI 27.25 kg/m      Pt alert and oriented. C/O ABD soreness, refused scheduled Tylenol. ABD is highly distended, tinkling bowel sounds, belching, passing gas. Did have nausea, Compazine PRN given 1x, pt then able to hold down some water. Aquacell in place, no shadowing past previously drawn borders. Troch sites WNL. Ambulating frequently in halls.     Face to face report given with opportunity to observe patient.    Report given to Zoe Cordoba RN   11/19/2023  7:07 AM    "

## 2023-11-20 ENCOUNTER — APPOINTMENT (OUTPATIENT)
Dept: GENERAL RADIOLOGY | Facility: HOSPITAL | Age: 73
DRG: 331 | End: 2023-11-20
Attending: SURGERY
Payer: COMMERCIAL

## 2023-11-20 ENCOUNTER — APPOINTMENT (OUTPATIENT)
Dept: PHYSICAL THERAPY | Facility: HOSPITAL | Age: 73
DRG: 331 | End: 2023-11-20
Attending: SURGERY
Payer: COMMERCIAL

## 2023-11-20 LAB
ACANTHOCYTES BLD QL SMEAR: NORMAL
ANION GAP SERPL CALCULATED.3IONS-SCNC: 13 MMOL/L (ref 7–15)
AUER BODIES BLD QL SMEAR: NORMAL
BASO STIPL BLD QL SMEAR: NORMAL
BASOPHILS # BLD AUTO: 0 10E3/UL (ref 0–0.2)
BASOPHILS NFR BLD AUTO: 1 %
BITE CELLS BLD QL SMEAR: NORMAL
BLISTER CELLS BLD QL SMEAR: NORMAL
BUN SERPL-MCNC: 23 MG/DL (ref 8–23)
BURR CELLS BLD QL SMEAR: NORMAL
CALCIUM SERPL-MCNC: 9.2 MG/DL (ref 8.8–10.2)
CHLORIDE SERPL-SCNC: 86 MMOL/L (ref 98–107)
CREAT SERPL-MCNC: 1.17 MG/DL (ref 0.67–1.17)
DACRYOCYTES BLD QL SMEAR: NORMAL
DEPRECATED HCO3 PLAS-SCNC: 28 MMOL/L (ref 22–29)
EGFRCR SERPLBLD CKD-EPI 2021: 66 ML/MIN/1.73M2
ELLIPTOCYTES BLD QL SMEAR: NORMAL
EOSINOPHIL # BLD AUTO: 0 10E3/UL (ref 0–0.7)
EOSINOPHIL NFR BLD AUTO: 1 %
ERYTHROCYTE [DISTWIDTH] IN BLOOD BY AUTOMATED COUNT: 11.9 % (ref 10–15)
FRAGMENTS BLD QL SMEAR: NORMAL
GLUCOSE SERPL-MCNC: 132 MG/DL (ref 70–99)
HCT VFR BLD AUTO: 40.7 % (ref 40–53)
HGB BLD-MCNC: 14.7 G/DL (ref 13.3–17.7)
HGB C CRYSTALS: NORMAL
HOWELL-JOLLY BOD BLD QL SMEAR: NORMAL
IMM GRANULOCYTES # BLD: 0 10E3/UL
IMM GRANULOCYTES NFR BLD: 0 %
LYMPHOCYTES # BLD AUTO: 0.9 10E3/UL (ref 0.8–5.3)
LYMPHOCYTES NFR BLD AUTO: 20 %
MAGNESIUM SERPL-MCNC: 1.9 MG/DL (ref 1.7–2.3)
MCH RBC QN AUTO: 32.7 PG (ref 26.5–33)
MCHC RBC AUTO-ENTMCNC: 36.1 G/DL (ref 31.5–36.5)
MCV RBC AUTO: 91 FL (ref 78–100)
MONOCYTES # BLD AUTO: 1.1 10E3/UL (ref 0–1.3)
MONOCYTES NFR BLD AUTO: 24 %
NEUTROPHILS # BLD AUTO: 2.5 10E3/UL (ref 1.6–8.3)
NEUTROPHILS NFR BLD AUTO: 54 %
NEUTS HYPERSEG BLD QL SMEAR: NORMAL
NRBC # BLD AUTO: 0 10E3/UL
NRBC BLD AUTO-RTO: 0 /100
PHOSPHATE SERPL-MCNC: 3.9 MG/DL (ref 2.5–4.5)
PLAT MORPH BLD: NORMAL
PLATELET # BLD AUTO: 218 10E3/UL (ref 150–450)
POLYCHROMASIA BLD QL SMEAR: NORMAL
POTASSIUM SERPL-SCNC: 3.2 MMOL/L (ref 3.4–5.3)
POTASSIUM SERPL-SCNC: 3.6 MMOL/L (ref 3.4–5.3)
RBC # BLD AUTO: 4.49 10E6/UL (ref 4.4–5.9)
RBC AGGLUT BLD QL: NORMAL
RBC MORPH BLD: NORMAL
ROULEAUX BLD QL SMEAR: NORMAL
SICKLE CELLS BLD QL SMEAR: NORMAL
SMUDGE CELLS BLD QL SMEAR: NORMAL
SODIUM SERPL-SCNC: 127 MMOL/L (ref 135–145)
SPHEROCYTES BLD QL SMEAR: NORMAL
STOMATOCYTES BLD QL SMEAR: NORMAL
TARGETS BLD QL SMEAR: NORMAL
TOXIC GRANULES BLD QL SMEAR: NORMAL
VARIANT LYMPHS BLD QL SMEAR: NORMAL
WBC # BLD AUTO: 4.6 10E3/UL (ref 4–11)

## 2023-11-20 PROCEDURE — 250N000013 HC RX MED GY IP 250 OP 250 PS 637: Performed by: SURGERY

## 2023-11-20 PROCEDURE — 999N000065 XR ABDOMEN 1 VIEW

## 2023-11-20 PROCEDURE — 36415 COLL VENOUS BLD VENIPUNCTURE: CPT | Performed by: SURGERY

## 2023-11-20 PROCEDURE — 258N000003 HC RX IP 258 OP 636: Performed by: SURGERY

## 2023-11-20 PROCEDURE — 84100 ASSAY OF PHOSPHORUS: CPT | Performed by: SURGERY

## 2023-11-20 PROCEDURE — 120N000001 HC R&B MED SURG/OB

## 2023-11-20 PROCEDURE — 97530 THERAPEUTIC ACTIVITIES: CPT | Mod: GP

## 2023-11-20 PROCEDURE — 250N000011 HC RX IP 250 OP 636: Mod: JZ | Performed by: SURGERY

## 2023-11-20 PROCEDURE — 83735 ASSAY OF MAGNESIUM: CPT | Performed by: SURGERY

## 2023-11-20 PROCEDURE — 80048 BASIC METABOLIC PNL TOTAL CA: CPT | Performed by: SURGERY

## 2023-11-20 PROCEDURE — 84132 ASSAY OF SERUM POTASSIUM: CPT | Performed by: SURGERY

## 2023-11-20 PROCEDURE — 85004 AUTOMATED DIFF WBC COUNT: CPT | Performed by: SURGERY

## 2023-11-20 RX ORDER — SODIUM CHLORIDE, SODIUM LACTATE, POTASSIUM CHLORIDE, CALCIUM CHLORIDE 600; 310; 30; 20 MG/100ML; MG/100ML; MG/100ML; MG/100ML
INJECTION, SOLUTION INTRAVENOUS CONTINUOUS
Status: DISCONTINUED | OUTPATIENT
Start: 2023-11-20 | End: 2023-11-20

## 2023-11-20 RX ORDER — POTASSIUM CHLORIDE 1500 MG/1
40 TABLET, EXTENDED RELEASE ORAL ONCE
Status: COMPLETED | OUTPATIENT
Start: 2023-11-20 | End: 2023-11-20

## 2023-11-20 RX ORDER — SODIUM CHLORIDE 9 MG/ML
INJECTION, SOLUTION INTRAVENOUS CONTINUOUS
Status: DISCONTINUED | OUTPATIENT
Start: 2023-11-20 | End: 2023-11-22 | Stop reason: HOSPADM

## 2023-11-20 RX ADMIN — SENNOSIDES AND DOCUSATE SODIUM 1 TABLET: 8.6; 5 TABLET ORAL at 08:33

## 2023-11-20 RX ADMIN — SIMETHICONE 80 MG: 80 TABLET, CHEWABLE ORAL at 20:57

## 2023-11-20 RX ADMIN — SIMETHICONE 80 MG: 80 TABLET, CHEWABLE ORAL at 08:33

## 2023-11-20 RX ADMIN — ENOXAPARIN SODIUM 40 MG: 40 INJECTION SUBCUTANEOUS at 08:33

## 2023-11-20 RX ADMIN — FAMOTIDINE 20 MG: 20 TABLET ORAL at 20:58

## 2023-11-20 RX ADMIN — SIMETHICONE 80 MG: 80 TABLET, CHEWABLE ORAL at 13:18

## 2023-11-20 RX ADMIN — SODIUM CHLORIDE, POTASSIUM CHLORIDE, SODIUM LACTATE AND CALCIUM CHLORIDE: 600; 310; 30; 20 INJECTION, SOLUTION INTRAVENOUS at 00:32

## 2023-11-20 RX ADMIN — SODIUM CHLORIDE, POTASSIUM CHLORIDE, SODIUM LACTATE AND CALCIUM CHLORIDE: 600; 310; 30; 20 INJECTION, SOLUTION INTRAVENOUS at 08:37

## 2023-11-20 RX ADMIN — SIMETHICONE 80 MG: 80 TABLET, CHEWABLE ORAL at 16:33

## 2023-11-20 RX ADMIN — SODIUM CHLORIDE: 9 INJECTION, SOLUTION INTRAVENOUS at 16:34

## 2023-11-20 RX ADMIN — FAMOTIDINE 20 MG: 10 INJECTION, SOLUTION INTRAVENOUS at 08:33

## 2023-11-20 RX ADMIN — POTASSIUM CHLORIDE 40 MEQ: 1500 TABLET, EXTENDED RELEASE ORAL at 16:33

## 2023-11-20 RX ADMIN — PROCHLORPERAZINE EDISYLATE 5 MG: 5 INJECTION INTRAMUSCULAR; INTRAVENOUS at 01:18

## 2023-11-20 RX ADMIN — SENNOSIDES AND DOCUSATE SODIUM 1 TABLET: 8.6; 5 TABLET ORAL at 20:58

## 2023-11-20 RX ADMIN — POLYETHYLENE GLYCOL 3350 17 G: 17 POWDER, FOR SOLUTION ORAL at 08:33

## 2023-11-20 ASSESSMENT — ACTIVITIES OF DAILY LIVING (ADL)
ADLS_ACUITY_SCORE: 21

## 2023-11-20 NOTE — PROGRESS NOTES
"   11/20/23 5442   Appointment Info   Signing Clinician's Name / Credentials (PT) Haim Rodriguez DPT   Therapeutic Activity   Therapeutic Activities: dynamic activities to improve functional performance Minutes (26890) 15   Symptoms Noted During/After Treatment None   Treatment Detail/Skilled Intervention Pt. upright in bed and agreeable to therapy. This  did not note NG tube in place upon arrival. Pt. Did have presence of brown liquid on gown and bedding ,from suction per self report, so linens were changed. Pt. ambulated C SPV and no AD for 500' without symptoms. Upon return to room, nursing questioned pt. about NG tube to which he admitted removing himself. PT left at this time with pt. in chair and nursing following up with provider.   PT Discharge Planning   PT Plan Progress gait, balance, and attempt bed mobility if pt has any concerns.   PT Discharge Recommendation (DC Rec) home with assist   PT Rationale for DC Rec Pt appears safe to D/C home with family assist as needed and anticipate pt will return to his PLOF as he continues to medically improve from his surgery   PT Brief overview of current status 500\" C SPV without AD. Mod I for mobility   Total Session Time   Timed Code Treatment Minutes 15   Total Session Time (sum of timed and untimed services) 15       "

## 2023-11-20 NOTE — PLAN OF CARE
"/75 (BP Location: Right arm)   Pulse 112   Temp 98.9  F (37.2  C) (Tympanic)   Resp 16   Ht 1.727 m (5' 8\")   Wt 81.3 kg (179 lb 3.7 oz)   SpO2 95%   BMI 27.25 kg/m      Pt alert and oriented. Denies pain. Battling nausea/vomiting all night despite Antiemetics. Surgeon made aware of vomiting and highly distended belly, tinkling/hypo bowel sounds. NG low intermit suc ordered and placed, tolerated well. 1900ml green/brown output. Aquacell dressing saturated (shower?) and falling off, ABD pad placed. Midline incision approximated.  HR regular. Lungs clear. Ambulated in halls frequently before NG placement.  Finally resting peacefully.     Face to face report given with opportunity to observe patient.    Report given to Humphrey Cordoba RN   11/20/2023  7:27 AM    "

## 2023-11-20 NOTE — PROGRESS NOTES
INPATIENT ROUNDING NOTE  11/20/2023    Patient: Christopher Lassiterjanineky    Physician of Record: Porfirio Matthew MD    Admitting diagnosis: Polyp of large intestine [K63.5]  Adenomatous polyp of ascending colon [D12.2]    Procedure(s):  hand-assisted laparoscopic right hemicolectomy     POD: 4 Days Post-Op    Current Diet: NPO    CURRENT MEDICATIONS:  Continuous Medications:  Current Facility-Administered Medications   Medication Last Rate    lactated ringers 100 mL/hr at 11/20/23 0837       Scheduled Medications:  Current Facility-Administered Medications   Medication Dose Route Frequency    enoxaparin ANTICOAGULANT  40 mg Subcutaneous Q24H    famotidine  20 mg Oral BID    Or    famotidine  20 mg Intravenous BID    polyethylene glycol  17 g Oral Daily    scopolamine   Transdermal Q8H CAROLE    senna-docusate  1 tablet Oral BID    simethicone  80 mg Oral 4x Daily    sodium chloride (PF)  3 mL Intracatheter Q8H    sodium chloride (PF)  3 mL Intracatheter Q8H       PRN Medications:  Current Facility-Administered Medications   Medication Dose Route Frequency    acetaminophen  650 mg Oral Q4H PRN    bisacodyl  10 mg Rectal Daily PRN    calcium carbonate  500 mg Oral Daily PRN    diazepam  5-10 mg Intravenous Q6H PRN    HYDROmorphone  0.2 mg Intravenous Q2H PRN    Or    HYDROmorphone  0.4 mg Intravenous Q2H PRN    lidocaine 4%   Topical Q1H PRN    lidocaine (buffered or not buffered)  0.1-1 mL Other Q1H PRN    magnesium hydroxide  30 mL Oral Daily PRN    naloxone  0.2 mg Intravenous Q2 Min PRN    Or    naloxone  0.4 mg Intravenous Q2 Min PRN    Or    naloxone  0.2 mg Intramuscular Q2 Min PRN    Or    naloxone  0.4 mg Intramuscular Q2 Min PRN    ondansetron  4 mg Oral Q6H PRN    Or    ondansetron  4 mg Intravenous Q6H PRN    oxyCODONE  5 mg Oral Q4H PRN    Or    oxyCODONE  10 mg Oral Q4H PRN    prochlorperazine  5 mg Intravenous Q6H PRN    Or    prochlorperazine  5 mg Oral Q6H PRN    sodium chloride (PF)  3 mL Intracatheter q1 min  "prn    sodium chloride (PF)  3 mL Intracatheter q1 min prn       SUBJECTIVE:   Nausea: No. Vomiting: No. Fever: No. Chills: No. Excessive burping: No. Flatus: No. BM: No. Pain is 3/10. Pain control: good.     PHYSICAL EXAM:   Vital signs: /61 (BP Location: Left arm, Patient Position: Sitting, Cuff Size: Adult Regular)   Pulse 108   Temp 97  F (36.1  C) (Tympanic)   Resp 20   Ht 1.727 m (5' 8\")   Wt 81.3 kg (179 lb 3.7 oz)   SpO2 (!) 91%   BMI 27.25 kg/m     Weight: [unfilled]   BMI: Body mass index is 27.25 kg/m .   General: Normal, healthy, cooperative, in no acute distress, alert   HEENT: PERRLA and EOMI   Neck: supple   Lungs: clear to auscultation   CV: Regular rate and rhythm without murmer   Abdominal: Abdomen soft, distended. non-tender. BS normal. No masses, organomegaly   Wound: Closed wound. Clean, dry and intact. Healing well.   Extremities: No cyanosis, clubbing or edema noted bilaterally in Upper and Lower Extremities   Neurological: without deficit    INPUT/OUTPUT:      Intake/Output Summary (Last 24 hours) at 11/20/2023 1604  Last data filed at 11/20/2023 0600  Gross per 24 hour   Intake 240 ml   Output 2250 ml   Net -2010 ml       I/O last 3 completed shifts:  In: 240 [P.O.:240]  Out: 2250 [Emesis/NG output:2250]    LABS:    Last CBC Rrsults:   Recent Labs   Lab Test 11/20/23 0528 11/19/23 0535 11/18/23 0527   WBC 4.6 12.5* 9.7   RBC 4.49 5.00 4.21*   HGB 14.7 16.4 14.0   HCT 40.7 45.0 38.5*   MCV 91 90 91   MCH 32.7 32.8 33.3*   MCHC 36.1 36.4 36.4   RDW 11.9 11.8 11.7    236 190       Last Comprehensive Metabolic panel:  Recent Labs   Lab Test 11/20/23  0528 11/19/23  0535 11/18/23  1816 11/18/23  0527 11/16/23  0645 11/09/23  1107 07/20/23  0822 12/01/21  1404 12/01/21  1404   * 129*  --  133*   < > 136 137  --  134   POTASSIUM 3.2* 3.6 3.2* 3.1*   < > 4.0 3.9   < > 3.8   CHLORIDE 86* 91*  --  97*   < > 102 101   < > 103   CO2 28 23  --  25   < > 23 23   < > 24   ANIONGAP " 13 15  --  11   < > 11 13   < > 7   * 115*  --  98  98   < > 88 84   < > 103*   BUN 23.0 9.8  --  3.2*   < > 7.6* 7.1*   < > 9   CR 1.17 0.73  --  0.57*   < > 0.68 0.69  --  0.78   GFRESTIMATED 66 >90  --  >90   < > >90 >90  --  >90   TOBIAS 9.2 9.4  --  8.7*   < > 9.2 9.4  --  8.9   BILITOTAL  --   --   --   --   --  1.1 1.0  --  1.2   ALKPHOS  --   --   --   --   --  92 101  --  99   ALT  --   --   --   --   --  24 25  --  30   AST  --   --   --   --   --  29 35  --  35    < > = values in this interval not displayed.       Recent Labs   Lab Test 11/20/23  0528 11/19/23  0535 11/18/23  0527 11/17/23  0518 11/09/23  1107 07/20/23  0822 12/01/21  1404   MAG 1.9 1.8 1.7   < >  --   --   --    ALBUMIN  --   --   --   --  4.2 4.0 3.8   PHOS 3.9 4.1 2.8   < >  --   --   --     < > = values in this interval not displayed.     ASSESSMENT:    4 Days Post-Op from Procedure(s):  hand-assisted laparoscopic right hemicolectomy.     Overall doing well.  Awaiting bowel function return.    PLAN: We will change to NS and replace potassium.

## 2023-11-20 NOTE — PROVIDER NOTIFICATION
"Writer saw pt walking in the hallway with physical therapy. Writer noted that NG was not in the nose of pt. Writer went in room and saw pt and pt stated \" I pulled the sucker out, I dont need it I am not gagging\"   Writer caller Dr. Matthew and notified him that pt removed own NG and provider stated that it can stay out of nose   "

## 2023-11-20 NOTE — PLAN OF CARE
Pt A&O, VS as charted. Up w/ SBA. Pt is pretty steady on his feet. Denies pain this shift. Was supposed to go home today, but experienced some nausea and vomited prior tho this writer's shift. On-call surgeon saw pt and would like him to stay another night. Abdomen is distended and pt has intermittent nausea. BS are hypoactive in all quadrants.  Xray of chest was clear, but abdomen states a possible illeus vs obstruction. A new IV was placed per surgeons request in right wrist. Bed in lowest position, wheels locked and alarms in place. Call light and personal items within reach and makes needs known. Room near nurses station w/ door open and frequent rounding.  Face to face report given with opportunity to observe patient.    Report given to JONG Chang RN   11/19/2023  7:08 PM

## 2023-11-21 ENCOUNTER — APPOINTMENT (OUTPATIENT)
Dept: PHYSICAL THERAPY | Facility: HOSPITAL | Age: 73
DRG: 331 | End: 2023-11-21
Attending: SURGERY
Payer: COMMERCIAL

## 2023-11-21 LAB
ACANTHOCYTES BLD QL SMEAR: NORMAL
ANION GAP SERPL CALCULATED.3IONS-SCNC: 14 MMOL/L (ref 7–15)
AUER BODIES BLD QL SMEAR: NORMAL
BASO STIPL BLD QL SMEAR: NORMAL
BASOPHILS # BLD AUTO: 0 10E3/UL (ref 0–0.2)
BASOPHILS NFR BLD AUTO: 1 %
BITE CELLS BLD QL SMEAR: NORMAL
BLISTER CELLS BLD QL SMEAR: NORMAL
BUN SERPL-MCNC: 36.7 MG/DL (ref 8–23)
BURR CELLS BLD QL SMEAR: NORMAL
CALCIUM SERPL-MCNC: 8.4 MG/DL (ref 8.8–10.2)
CHLORIDE SERPL-SCNC: 90 MMOL/L (ref 98–107)
CREAT SERPL-MCNC: 1.06 MG/DL (ref 0.67–1.17)
DACRYOCYTES BLD QL SMEAR: NORMAL
DEPRECATED HCO3 PLAS-SCNC: 27 MMOL/L (ref 22–29)
EGFRCR SERPLBLD CKD-EPI 2021: 75 ML/MIN/1.73M2
ELLIPTOCYTES BLD QL SMEAR: NORMAL
EOSINOPHIL # BLD AUTO: 0.1 10E3/UL (ref 0–0.7)
EOSINOPHIL NFR BLD AUTO: 1 %
ERYTHROCYTE [DISTWIDTH] IN BLOOD BY AUTOMATED COUNT: 12 % (ref 10–15)
FRAGMENTS BLD QL SMEAR: NORMAL
GLUCOSE SERPL-MCNC: 97 MG/DL (ref 70–99)
HCT VFR BLD AUTO: 37.8 % (ref 40–53)
HGB BLD-MCNC: 13.5 G/DL (ref 13.3–17.7)
HGB C CRYSTALS: NORMAL
HOWELL-JOLLY BOD BLD QL SMEAR: NORMAL
IMM GRANULOCYTES # BLD: 0 10E3/UL
IMM GRANULOCYTES NFR BLD: 0 %
LYMPHOCYTES # BLD AUTO: 0.9 10E3/UL (ref 0.8–5.3)
LYMPHOCYTES NFR BLD AUTO: 18 %
MAGNESIUM SERPL-MCNC: 2 MG/DL (ref 1.7–2.3)
MCH RBC QN AUTO: 32.5 PG (ref 26.5–33)
MCHC RBC AUTO-ENTMCNC: 35.7 G/DL (ref 31.5–36.5)
MCV RBC AUTO: 91 FL (ref 78–100)
MONOCYTES # BLD AUTO: 1.4 10E3/UL (ref 0–1.3)
MONOCYTES NFR BLD AUTO: 28 %
NEUTROPHILS # BLD AUTO: 2.5 10E3/UL (ref 1.6–8.3)
NEUTROPHILS NFR BLD AUTO: 52 %
NEUTS HYPERSEG BLD QL SMEAR: NORMAL
NRBC # BLD AUTO: 0 10E3/UL
NRBC BLD AUTO-RTO: 0 /100
PHOSPHATE SERPL-MCNC: 3.7 MG/DL (ref 2.5–4.5)
PLAT MORPH BLD: NORMAL
PLATELET # BLD AUTO: 228 10E3/UL (ref 150–450)
POLYCHROMASIA BLD QL SMEAR: NORMAL
POTASSIUM SERPL-SCNC: 3.4 MMOL/L (ref 3.4–5.3)
POTASSIUM SERPL-SCNC: 3.4 MMOL/L (ref 3.4–5.3)
POTASSIUM SERPL-SCNC: 3.7 MMOL/L (ref 3.4–5.3)
RBC # BLD AUTO: 4.16 10E6/UL (ref 4.4–5.9)
RBC AGGLUT BLD QL: NORMAL
RBC MORPH BLD: NORMAL
ROULEAUX BLD QL SMEAR: NORMAL
SICKLE CELLS BLD QL SMEAR: NORMAL
SMUDGE CELLS BLD QL SMEAR: NORMAL
SODIUM SERPL-SCNC: 131 MMOL/L (ref 135–145)
SPHEROCYTES BLD QL SMEAR: NORMAL
STOMATOCYTES BLD QL SMEAR: NORMAL
TARGETS BLD QL SMEAR: NORMAL
TOXIC GRANULES BLD QL SMEAR: NORMAL
VARIANT LYMPHS BLD QL SMEAR: NORMAL
WBC # BLD AUTO: 4.9 10E3/UL (ref 4–11)

## 2023-11-21 PROCEDURE — 250N000011 HC RX IP 250 OP 636: Mod: JZ | Performed by: SURGERY

## 2023-11-21 PROCEDURE — 84132 ASSAY OF SERUM POTASSIUM: CPT | Performed by: SURGERY

## 2023-11-21 PROCEDURE — 120N000001 HC R&B MED SURG/OB

## 2023-11-21 PROCEDURE — 250N000013 HC RX MED GY IP 250 OP 250 PS 637: Performed by: SURGERY

## 2023-11-21 PROCEDURE — 83735 ASSAY OF MAGNESIUM: CPT | Performed by: SURGERY

## 2023-11-21 PROCEDURE — 80048 BASIC METABOLIC PNL TOTAL CA: CPT | Performed by: SURGERY

## 2023-11-21 PROCEDURE — 84100 ASSAY OF PHOSPHORUS: CPT | Performed by: SURGERY

## 2023-11-21 PROCEDURE — 85025 COMPLETE CBC W/AUTO DIFF WBC: CPT | Performed by: SURGERY

## 2023-11-21 PROCEDURE — 36415 COLL VENOUS BLD VENIPUNCTURE: CPT | Performed by: SURGERY

## 2023-11-21 PROCEDURE — 258N000003 HC RX IP 258 OP 636: Performed by: SURGERY

## 2023-11-21 RX ORDER — POTASSIUM CHLORIDE 1500 MG/1
20 TABLET, EXTENDED RELEASE ORAL ONCE
Status: COMPLETED | OUTPATIENT
Start: 2023-11-21 | End: 2023-11-21

## 2023-11-21 RX ORDER — POTASSIUM CHLORIDE 7.45 MG/ML
10 INJECTION INTRAVENOUS
Status: COMPLETED | OUTPATIENT
Start: 2023-11-21 | End: 2023-11-22

## 2023-11-21 RX ORDER — POTASSIUM CHLORIDE 1500 MG/1
40 TABLET, EXTENDED RELEASE ORAL ONCE
Status: COMPLETED | OUTPATIENT
Start: 2023-11-21 | End: 2023-11-21

## 2023-11-21 RX ADMIN — POTASSIUM CHLORIDE 20 MEQ: 1500 TABLET, EXTENDED RELEASE ORAL at 00:13

## 2023-11-21 RX ADMIN — ENOXAPARIN SODIUM 40 MG: 40 INJECTION SUBCUTANEOUS at 09:52

## 2023-11-21 RX ADMIN — SODIUM CHLORIDE: 9 INJECTION, SOLUTION INTRAVENOUS at 00:08

## 2023-11-21 RX ADMIN — POTASSIUM CHLORIDE 10 MEQ: 7.46 INJECTION, SOLUTION INTRAVENOUS at 22:18

## 2023-11-21 RX ADMIN — SIMETHICONE 80 MG: 80 TABLET, CHEWABLE ORAL at 09:54

## 2023-11-21 RX ADMIN — FAMOTIDINE 20 MG: 20 TABLET ORAL at 09:53

## 2023-11-21 RX ADMIN — ONDANSETRON 4 MG: 4 TABLET, ORALLY DISINTEGRATING ORAL at 20:51

## 2023-11-21 RX ADMIN — SIMETHICONE 80 MG: 80 TABLET, CHEWABLE ORAL at 13:58

## 2023-11-21 RX ADMIN — SIMETHICONE 80 MG: 80 TABLET, CHEWABLE ORAL at 17:59

## 2023-11-21 RX ADMIN — FAMOTIDINE 20 MG: 20 TABLET ORAL at 20:51

## 2023-11-21 RX ADMIN — SIMETHICONE 80 MG: 80 TABLET, CHEWABLE ORAL at 20:51

## 2023-11-21 RX ADMIN — POTASSIUM CHLORIDE 20 MEQ: 1500 TABLET, EXTENDED RELEASE ORAL at 13:58

## 2023-11-21 RX ADMIN — SODIUM CHLORIDE: 9 INJECTION, SOLUTION INTRAVENOUS at 16:36

## 2023-11-21 RX ADMIN — CALCIUM CARBONATE 500 MG: 500 TABLET, CHEWABLE ORAL at 19:50

## 2023-11-21 RX ADMIN — SODIUM CHLORIDE: 9 INJECTION, SOLUTION INTRAVENOUS at 09:51

## 2023-11-21 RX ADMIN — ONDANSETRON 4 MG: 2 INJECTION INTRAMUSCULAR; INTRAVENOUS at 04:47

## 2023-11-21 RX ADMIN — POTASSIUM CHLORIDE 40 MEQ: 1500 TABLET, EXTENDED RELEASE ORAL at 06:48

## 2023-11-21 RX ADMIN — POTASSIUM CHLORIDE 10 MEQ: 7.46 INJECTION, SOLUTION INTRAVENOUS at 23:33

## 2023-11-21 ASSESSMENT — ACTIVITIES OF DAILY LIVING (ADL)
ADLS_ACUITY_SCORE: 21

## 2023-11-21 NOTE — PLAN OF CARE
"Reason for hospital stay:  Adenomatous polyp  Living situation PTA: Home  Most recent vitals: /59 (BP Location: Right arm, Patient Position: Supine, Cuff Size: Adult Regular)   Pulse 106   Temp 99  F (37.2  C) (Tympanic)   Resp 18   Ht 1.727 m (5' 8\")   Wt 81.3 kg (179 lb 3.7 oz)   SpO2 93%   BMI 27.25 kg/m      Pain Management:  Patient denied any pain overnight  LOC:  A&O x4  Cardiac:  Apical pulse regular  Respiratory:  Lung sounds clear and equal bilaterally, maintaining sats on room air  GI:  Bowel sounds audible but hypoactive. Midline abdominal incision and trochar site. Patient did have a loose bowel movement overnight. Patient did have an emesis around 04:45, administered PRN Zofran, patient reported relief of nausea.  :  Voiding spontaneously without difficulty  Skin Issues:  Midline abdominal incision and trochar site    IVF:   mL/hr  ABX:  None    Nutrition: NPO, ice chips  Activity: Independent  Safety:  Call light within reach, calls appropriately, makes needs known, wheels locked, lighting adjusted, room near unit station, room door open.     Comments:  Patient was found reconnecting IV fluid tubing to IV before this writer was able to intervene - Patient's IV was found to be dislodged shortly after. New IV was placed. This writer educated patient on the risks of infection associated and instructed patient to call if he feels the need to be disconnected in the future.      Face to face report given with opportunity to observe patient.    Report given to JONG Trejo RN   11/21/2023  7:12 AM    "

## 2023-11-21 NOTE — PROGRESS NOTES
11/21/23 1100   Appointment Info   Signing Clinician's Name / Credentials (PT) Yazmin Valencia DPT   Gait Training   Gait Training Minutes (86650) 10   Symptoms Noted During/After Treatment (Gait Training) none   Treatment Detail/Skilled Intervention Patient ambulated 1000' feet on unit independent. No losses of balance , no rest breaks needed   Distance in Feet 1000   [place] Level (Gait Training) independent   PT Discharge Planning   PT Plan d/c   PT Discharge Recommendation (DC Rec) home   PT Rationale for DC Rec Goals met; will d/c and patient can ambulate independently on unit   PT Brief overview of current status 1000' independent   Total Session Time   Timed Code Treatment Minutes 10   Total Session Time (sum of timed and untimed services) 10

## 2023-11-21 NOTE — PROGRESS NOTES
INPATIENT ROUNDING NOTE  11/21/2023    Patient: Christopher Lassiterjanineky    Physician of Record: Porfirio Matthew MD    Admitting diagnosis: Polyp of large intestine [K63.5]  Adenomatous polyp of ascending colon [D12.2]    Procedure(s):  hand-assisted laparoscopic right hemicolectomy     POD: 5 Days Post-Op    Current Diet: Clear liquids    CURRENT MEDICATIONS:  Continuous Medications:  Current Facility-Administered Medications   Medication Last Rate    sodium chloride 125 mL/hr at 11/21/23 0951       Scheduled Medications:  Current Facility-Administered Medications   Medication Dose Route Frequency    enoxaparin ANTICOAGULANT  40 mg Subcutaneous Q24H    famotidine  20 mg Oral BID    Or    famotidine  20 mg Intravenous BID    polyethylene glycol  17 g Oral Daily    scopolamine   Transdermal Q8H CAROLE    senna-docusate  1 tablet Oral BID    simethicone  80 mg Oral 4x Daily    sodium chloride (PF)  3 mL Intracatheter Q8H    sodium chloride (PF)  3 mL Intracatheter Q8H       PRN Medications:  Current Facility-Administered Medications   Medication Dose Route Frequency    acetaminophen  650 mg Oral Q4H PRN    bisacodyl  10 mg Rectal Daily PRN    calcium carbonate  500 mg Oral Daily PRN    diazepam  5-10 mg Intravenous Q6H PRN    HYDROmorphone  0.2 mg Intravenous Q2H PRN    Or    HYDROmorphone  0.4 mg Intravenous Q2H PRN    lidocaine 4%   Topical Q1H PRN    lidocaine (buffered or not buffered)  0.1-1 mL Other Q1H PRN    magnesium hydroxide  30 mL Oral Daily PRN    naloxone  0.2 mg Intravenous Q2 Min PRN    Or    naloxone  0.4 mg Intravenous Q2 Min PRN    Or    naloxone  0.2 mg Intramuscular Q2 Min PRN    Or    naloxone  0.4 mg Intramuscular Q2 Min PRN    ondansetron  4 mg Oral Q6H PRN    Or    ondansetron  4 mg Intravenous Q6H PRN    oxyCODONE  5 mg Oral Q4H PRN    Or    oxyCODONE  10 mg Oral Q4H PRN    prochlorperazine  5 mg Intravenous Q6H PRN    Or    prochlorperazine  5 mg Oral Q6H PRN    sodium chloride (PF)  3 mL  "Intracatheter q1 min prn    sodium chloride (PF)  3 mL Intracatheter q1 min prn       SUBJECTIVE:   Nausea: No. Vomiting: No. Fever: No. Chills: No. Excessive burping: No. Flatus: Yes. BM: Yes. Pain is 3/10. Pain control: good. Tolerating current diet: Yes.      PHYSICAL EXAM:   Vital signs: /59 (BP Location: Right arm, Patient Position: Supine, Cuff Size: Adult Regular)   Pulse 106   Temp 99  F (37.2  C) (Tympanic)   Resp 18   Ht 1.727 m (5' 8\")   Wt 81.3 kg (179 lb 3.7 oz)   SpO2 93%   BMI 27.25 kg/m     Weight: [unfilled]   BMI: Body mass index is 27.25 kg/m .   General: Normal, healthy, cooperative, in no acute distress, alert   HEENT: PERRLA and EOMI   Neck: supple   Lungs: clear to auscultation   CV: Regular rate and rhythm without murmer   Abdominal: Abdomen soft, non-tender. BS normal. No masses, organomegaly   Wound: Closed wound. Clean, dry and intact. Healing well.   Extremities: No cyanosis, clubbing or edema noted bilaterally in Upper and Lower Extremities   Neurological: without deficit    INPUT/OUTPUT:      Intake/Output Summary (Last 24 hours) at 11/21/2023 1029  Last data filed at 11/21/2023 0445  Gross per 24 hour   Intake 2484 ml   Output 25 ml   Net 2459 ml       I/O last 3 completed shifts:  In: 2484 [I.V.:2484]  Out: 225 [Emesis/NG output:225]    LABS:    Last CBC Rrsults:   Recent Labs   Lab Test 11/21/23  0521 11/20/23 0528 11/19/23  0535   WBC 4.9 4.6 12.5*   RBC 4.16* 4.49 5.00   HGB 13.5 14.7 16.4   HCT 37.8* 40.7 45.0   MCV 91 91 90   MCH 32.5 32.7 32.8   MCHC 35.7 36.1 36.4   RDW 12.0 11.9 11.8    218 236       Last Comprehensive Metabolic panel:  Recent Labs   Lab Test 11/21/23  0521 11/20/23 2103 11/20/23  0528 11/19/23  0535 11/16/23  0645 11/09/23  1107 07/20/23  0822 12/01/21  1404 12/01/21  1404   *  --  127* 129*   < > 136 137  --  134   POTASSIUM 3.4 3.6 3.2* 3.6   < > 4.0 3.9   < > 3.8   CHLORIDE 90*  --  86* 91*   < > 102 101   < > 103   CO2 27  --  28 " 23   < > 23 23   < > 24   ANIONGAP 14  --  13 15   < > 11 13   < > 7   GLC 97  --  132* 115*   < > 88 84   < > 103*   BUN 36.7*  --  23.0 9.8   < > 7.6* 7.1*   < > 9   CR 1.06  --  1.17 0.73   < > 0.68 0.69  --  0.78   GFRESTIMATED 75  --  66 >90   < > >90 >90  --  >90   TOBIAS 8.4*  --  9.2 9.4   < > 9.2 9.4  --  8.9   BILITOTAL  --   --   --   --   --  1.1 1.0  --  1.2   ALKPHOS  --   --   --   --   --  92 101  --  99   ALT  --   --   --   --   --  24 25  --  30   AST  --   --   --   --   --  29 35  --  35    < > = values in this interval not displayed.       Recent Labs   Lab Test 11/21/23  0521 11/20/23  0528 11/19/23  0535 11/17/23  0518 11/09/23  1107 07/20/23  0822 12/01/21  1404   MAG 2.0 1.9 1.8   < >  --   --   --    ALBUMIN  --   --   --   --  4.2 4.0 3.8   PHOS 3.7 3.9 4.1   < >  --   --   --     < > = values in this interval not displayed.       ASSESSMENT:    5 Days Post-Op from Procedure(s):  hand-assisted laparoscopic right hemicolectomy.     Overall doing better.  Electrolytes are stabilizing.  Bowel function is returning.    PLAN: Advance diet to regular.  Probably home in a.m.

## 2023-11-21 NOTE — PLAN OF CARE
"Goal Outcome Evaluation:       Reason for hospital stay:  adenomatous polyp   Living situation PTA: home   Most recent vitals: /61 (BP Location: Left arm, Patient Position: Sitting, Cuff Size: Adult Regular)   Pulse 108   Temp 97  F (36.1  C) (Tympanic)   Resp 20   Ht 1.727 m (5' 8\")   Wt 81.3 kg (179 lb 3.7 oz)   SpO2 (!) 91%   BMI 27.25 kg/m      Pain Management:  denied pain   LOC:  A&O   Cardiac:  apical regular   Respiratory:  room air, lungs clear   GI:  midline ABD incision with trochar site, bowel sounds active, passing flatus, 3 BM per pt - writer did inform pt to allow nurse to see next BM   :  WNL  Skin Issues:  midline ABD incision and trochar site     IVF:  125ml/hr NS   ABX:  none    Nutrition: NPO ice chips   ADL's:  independent   Ambulation:independent   Safety:  call light in place, wheels locked, room near nurses station with door open       Comments: pt went on multiple walks by self    Pt removed own NG this morning as he felt he no longer needed it - see provider notification note   Pt also unscrewed own IV tubing this morning so he he could go on a walk - nurses provided education with pt to leave IV tubing connected and to walk with IV pole. Nurse got a new bag of fluids and Iv tubing and reconnected tubing     Face to face report given with opportunity to observe patient.    Report given to Magy Su RN   11/20/2023  7:14 PM                     "

## 2023-11-21 NOTE — PROGRESS NOTES
Physical Therapy Discharge Summary    Reason for therapy discharge:    All goals and outcomes met, no further needs identified.    Progress towards therapy goal(s). See goals on Care Plan in [facility] electronic health record for goal details.  Goals met    Therapy recommendation(s):    Continue home exercise program.

## 2023-11-22 VITALS
HEIGHT: 68 IN | OXYGEN SATURATION: 94 % | HEART RATE: 102 BPM | WEIGHT: 179.23 LBS | DIASTOLIC BLOOD PRESSURE: 76 MMHG | SYSTOLIC BLOOD PRESSURE: 143 MMHG | RESPIRATION RATE: 16 BRPM | TEMPERATURE: 99 F | BODY MASS INDEX: 27.16 KG/M2

## 2023-11-22 LAB
ANION GAP SERPL CALCULATED.3IONS-SCNC: 14 MMOL/L (ref 7–15)
BASOPHILS # BLD AUTO: ABNORMAL 10*3/UL
BASOPHILS # BLD MANUAL: 0.1 10E3/UL (ref 0–0.2)
BASOPHILS NFR BLD AUTO: ABNORMAL %
BASOPHILS NFR BLD MANUAL: 1 %
BUN SERPL-MCNC: 20.7 MG/DL (ref 8–23)
CALCIUM SERPL-MCNC: 8.3 MG/DL (ref 8.8–10.2)
CHLORIDE SERPL-SCNC: 95 MMOL/L (ref 98–107)
CREAT SERPL-MCNC: 0.62 MG/DL (ref 0.67–1.17)
DEPRECATED HCO3 PLAS-SCNC: 22 MMOL/L (ref 22–29)
EGFRCR SERPLBLD CKD-EPI 2021: >90 ML/MIN/1.73M2
EOSINOPHIL # BLD AUTO: ABNORMAL 10*3/UL
EOSINOPHIL # BLD MANUAL: 0.1 10E3/UL (ref 0–0.7)
EOSINOPHIL NFR BLD AUTO: ABNORMAL %
EOSINOPHIL NFR BLD MANUAL: 1 %
ERYTHROCYTE [DISTWIDTH] IN BLOOD BY AUTOMATED COUNT: 11.9 % (ref 10–15)
GLUCOSE SERPL-MCNC: 90 MG/DL (ref 70–99)
HCT VFR BLD AUTO: 36.3 % (ref 40–53)
HGB BLD-MCNC: 12.9 G/DL (ref 13.3–17.7)
IMM GRANULOCYTES # BLD: ABNORMAL 10*3/UL
IMM GRANULOCYTES NFR BLD: ABNORMAL %
LYMPHOCYTES # BLD AUTO: ABNORMAL 10*3/UL
LYMPHOCYTES # BLD MANUAL: 0.9 10E3/UL (ref 0.8–5.3)
LYMPHOCYTES NFR BLD AUTO: ABNORMAL %
LYMPHOCYTES NFR BLD MANUAL: 14 %
MAGNESIUM SERPL-MCNC: 2 MG/DL (ref 1.7–2.3)
MCH RBC QN AUTO: 33.5 PG (ref 26.5–33)
MCHC RBC AUTO-ENTMCNC: 35.5 G/DL (ref 31.5–36.5)
MCV RBC AUTO: 94 FL (ref 78–100)
MONOCYTES # BLD AUTO: ABNORMAL 10*3/UL
MONOCYTES # BLD MANUAL: 1.3 10E3/UL (ref 0–1.3)
MONOCYTES NFR BLD AUTO: ABNORMAL %
MONOCYTES NFR BLD MANUAL: 21 %
NEUTROPHILS # BLD AUTO: ABNORMAL 10*3/UL
NEUTROPHILS # BLD MANUAL: 3.9 10E3/UL (ref 1.6–8.3)
NEUTROPHILS NFR BLD AUTO: ABNORMAL %
NEUTROPHILS NFR BLD MANUAL: 63 %
NRBC # BLD AUTO: 0 10E3/UL
NRBC BLD AUTO-RTO: 0 /100
PATH REPORT.COMMENTS IMP SPEC: NORMAL
PATH REPORT.FINAL DX SPEC: NORMAL
PATH REPORT.GROSS SPEC: NORMAL
PATH REPORT.MICROSCOPIC SPEC OTHER STN: NORMAL
PATH REPORT.RELEVANT HX SPEC: NORMAL
PHOSPHATE SERPL-MCNC: 2.2 MG/DL (ref 2.5–4.5)
PHOTO IMAGE: NORMAL
PLAT MORPH BLD: NORMAL
PLATELET # BLD AUTO: 253 10E3/UL (ref 150–450)
POTASSIUM SERPL-SCNC: 4 MMOL/L (ref 3.4–5.3)
RBC # BLD AUTO: 3.85 10E6/UL (ref 4.4–5.9)
RBC MORPH BLD: NORMAL
SODIUM SERPL-SCNC: 131 MMOL/L (ref 135–145)
WBC # BLD AUTO: 6.2 10E3/UL (ref 4–11)

## 2023-11-22 PROCEDURE — 36415 COLL VENOUS BLD VENIPUNCTURE: CPT | Performed by: SURGERY

## 2023-11-22 PROCEDURE — 80048 BASIC METABOLIC PNL TOTAL CA: CPT | Performed by: SURGERY

## 2023-11-22 PROCEDURE — 85027 COMPLETE CBC AUTOMATED: CPT | Performed by: SURGERY

## 2023-11-22 PROCEDURE — 250N000013 HC RX MED GY IP 250 OP 250 PS 637: Performed by: SURGERY

## 2023-11-22 PROCEDURE — 83735 ASSAY OF MAGNESIUM: CPT | Performed by: SURGERY

## 2023-11-22 PROCEDURE — 258N000003 HC RX IP 258 OP 636: Performed by: SURGERY

## 2023-11-22 PROCEDURE — 84100 ASSAY OF PHOSPHORUS: CPT | Performed by: SURGERY

## 2023-11-22 PROCEDURE — 250N000011 HC RX IP 250 OP 636: Mod: JZ | Performed by: SURGERY

## 2023-11-22 PROCEDURE — 85007 BL SMEAR W/DIFF WBC COUNT: CPT | Performed by: SURGERY

## 2023-11-22 RX ORDER — HYDROCODONE BITARTRATE AND ACETAMINOPHEN 5; 325 MG/1; MG/1
1 TABLET ORAL EVERY 6 HOURS PRN
Qty: 18 TABLET | Refills: 0 | Status: SHIPPED | OUTPATIENT
Start: 2023-11-22 | End: 2023-11-25

## 2023-11-22 RX ADMIN — POTASSIUM CHLORIDE 10 MEQ: 7.46 INJECTION, SOLUTION INTRAVENOUS at 00:40

## 2023-11-22 RX ADMIN — FAMOTIDINE 20 MG: 20 TABLET ORAL at 09:56

## 2023-11-22 RX ADMIN — SODIUM CHLORIDE: 9 INJECTION, SOLUTION INTRAVENOUS at 01:44

## 2023-11-22 RX ADMIN — POTASSIUM CHLORIDE 10 MEQ: 7.46 INJECTION, SOLUTION INTRAVENOUS at 01:42

## 2023-11-22 RX ADMIN — SIMETHICONE 80 MG: 80 TABLET, CHEWABLE ORAL at 09:56

## 2023-11-22 ASSESSMENT — ACTIVITIES OF DAILY LIVING (ADL)
ADLS_ACUITY_SCORE: 21

## 2023-11-22 NOTE — PLAN OF CARE
Face to face report given with opportunity to observe patient.    Report given to Elsa MENDOZA RN   11/21/2023  7:17 PM

## 2023-11-22 NOTE — DISCHARGE SUMMARY
INPATIENT DISCHARGE SUMMARY  11/22/2023    Patient'S Name: Christopher Valentine    Admitting Physician of Record: Porfirio Matthew MD    Discharging Physician: Porfirio Matthew MD    Date of admission: 11/16/2023     Date of discharge: 11/22/2023    Admitting diagnosis: Polyp of large intestine [K63.5]  Adenomatous polyp of ascending colon [D12.2]    Discharge diagnosis: Same    Procedures: Procedure(s):  hand-assisted laparoscopic right hemicolectomy    Consultants: None    Hospital course: The patient was admitted to the hospital and taken to the operating room and underwent an Procedure(s):  hand-assisted laparoscopic right hemicolectomy.  The patient tolerated the procedure(s) well and was transferred to the matos.  His postoperative course has been completely unremarkable.  At the time of discharge he is eating a Regular diet, is having good pain control on oral medications, and is passing gas and is having bowel movements.  The patient will be discharged home in good condition.    Discharge instructions include:    Patient will be discharged to Home   Diet:   Active Diet and Nourishment Order   Procedures    Regular Diet Adult    Diet      Activity : no liting over 10 pounds for 6 weeks   Follow-up:     The patient will follow up with his primary care provider in 1 weeks.      The patient will follow up with me in 1 weeks.   Medications include:    All prior medications    Oxycodone (Norco) for pain control.

## 2023-11-22 NOTE — PLAN OF CARE
"Goal Outcome Evaluation:      Plan of Care Reviewed With: patient      Up ambulating independently in the halls and to the bathroom multiple times today. Reports passing flatus and loose stools but flushes prior to staff seeing. Abdomen round , firm, with audible -hypoactive bowel sounds. Small area of drainage unchanged to abd dressing. No emesis. Using IS with reminders to 1750. Dr moore in this morning-diet advanced. Taking solids carefully-states \"full feeling\" to abdomen. VSS Afebrile. IV remains patent           "

## 2023-11-22 NOTE — PLAN OF CARE
Goal Outcome Evaluation: Progressing. VS as charted. Remains on room air. Denies pain. Abdomen remains distended, hypoactive bowel sounds. Patient reports passing gas and 2 loose stools. TUMS given for heartburn with some relief. Patient had 50ml emesis and repeatedly coughing up phlegm during evening, states he is not nauseous but agreeable to oral Zofran. No further emesis this shift, continues to deny nausea. Abdominal dressing changed, small amount of new drainage marked. IVF infusing per order. Potassium replaced per protocol, replaced with IV infusions as patient refusing oral potassium. Up independently in room.          Face to face report given with opportunity to observe patient.    Report given to Maya Harris RN   11/22/2023  7:10 AM

## 2023-11-22 NOTE — PROGRESS NOTES
INPATIENT ROUNDING NOTE  11/22/2023    Patient: Christopher Valentine    Physician of Record: Porfirio Matthew MD    Admitting diagnosis: Polyp of large intestine [K63.5]  Adenomatous polyp of ascending colon [D12.2]    Procedure(s):  hand-assisted laparoscopic right hemicolectomy     POD: 6 Days Post-Op    Current Diet: Regular    CURRENT MEDICATIONS:  Continuous Medications:  Current Facility-Administered Medications   Medication Last Rate    sodium chloride 125 mL/hr at 11/22/23 0144       Scheduled Medications:  Current Facility-Administered Medications   Medication Dose Route Frequency    enoxaparin ANTICOAGULANT  40 mg Subcutaneous Q24H    famotidine  20 mg Oral BID    Or    famotidine  20 mg Intravenous BID    simethicone  80 mg Oral 4x Daily    sodium chloride (PF)  3 mL Intracatheter Q8H       PRN Medications:  Current Facility-Administered Medications   Medication Dose Route Frequency    acetaminophen  650 mg Oral Q4H PRN    bisacodyl  10 mg Rectal Daily PRN    calcium carbonate  500 mg Oral Daily PRN    diazepam  5-10 mg Intravenous Q6H PRN    HYDROmorphone  0.2 mg Intravenous Q2H PRN    Or    HYDROmorphone  0.4 mg Intravenous Q2H PRN    lidocaine 4%   Topical Q1H PRN    lidocaine (buffered or not buffered)  0.1-1 mL Other Q1H PRN    magnesium hydroxide  30 mL Oral Daily PRN    naloxone  0.2 mg Intravenous Q2 Min PRN    Or    naloxone  0.4 mg Intravenous Q2 Min PRN    Or    naloxone  0.2 mg Intramuscular Q2 Min PRN    Or    naloxone  0.4 mg Intramuscular Q2 Min PRN    ondansetron  4 mg Oral Q6H PRN    Or    ondansetron  4 mg Intravenous Q6H PRN    oxyCODONE  5 mg Oral Q4H PRN    Or    oxyCODONE  10 mg Oral Q4H PRN    prochlorperazine  5 mg Intravenous Q6H PRN    Or    prochlorperazine  5 mg Oral Q6H PRN    sodium chloride (PF)  3 mL Intracatheter q1 min prn    sodium chloride (PF)  3 mL Intracatheter q1 min prn       SUBJECTIVE:   Nausea: No. Vomiting: No. Fever: No. Chills: No. Excessive burping: No. Flatus:  "Yes. BM: Yes. Pain control: good. Tolerating current diet: Yes.      PHYSICAL EXAM:   Vital signs: /74 (BP Location: Right arm, Patient Position: Semi-Jones's, Cuff Size: Adult Regular)   Pulse 94   Temp 99.2  F (37.3  C) (Tympanic)   Resp 16   Ht 1.727 m (5' 8\")   Wt 81.3 kg (179 lb 3.7 oz)   SpO2 93%   BMI 27.25 kg/m     BMI: Body mass index is 27.25 kg/m .   General: Normal, healthy, cooperative, in no acute distress, alert   Lungs: respirations are non-labored   Abdominal: non-distended   Extremities: No cyanosis, clubbing or edema noted bilaterally in Upper and Lower Extremities   Neurological: without deficit    INPUT/OUTPUT:      Intake/Output Summary (Last 24 hours) at 11/22/2023 0907  Last data filed at 11/22/2023 0527  Gross per 24 hour   Intake 3578.25 ml   Output 50 ml   Net 3528.25 ml       I/O last 3 completed shifts:  In: 3578.25 [P.O.:540; I.V.:3038.25]  Out: 50 [Emesis/NG output:50]    LABS:    Last CBC Rrsults:   Recent Labs   Lab Test 11/22/23  0513 11/21/23  0521 11/20/23  0528   WBC 6.2 4.9 4.6   RBC 3.85* 4.16* 4.49   HGB 12.9* 13.5 14.7   HCT 36.3* 37.8* 40.7   MCV 94 91 91   MCH 33.5* 32.5 32.7   MCHC 35.5 35.7 36.1   RDW 11.9 12.0 11.9    228 218       Last Comprehensive Metabolic panel:  Recent Labs   Lab Test 11/22/23  0513 11/21/23  1900 11/21/23  1148 11/21/23  0521 11/20/23  2103 11/20/23  0528 11/16/23  0645 11/09/23  1107 07/20/23  0822 12/01/21  1404 12/01/21  1404   *  --   --  131*  --  127*   < > 136 137  --  134   POTASSIUM 4.0 3.4 3.7 3.4   < > 3.2*   < > 4.0 3.9   < > 3.8   CHLORIDE 95*  --   --  90*  --  86*   < > 102 101   < > 103   CO2 22  --   --  27  --  28   < > 23 23   < > 24   ANIONGAP 14  --   --  14  --  13   < > 11 13   < > 7   GLC 90  --   --  97  --  132*   < > 88 84   < > 103*   BUN 20.7  --   --  36.7*  --  23.0   < > 7.6* 7.1*   < > 9   CR 0.62*  --   --  1.06  --  1.17   < > 0.68 0.69  --  0.78   GFRESTIMATED >90  --   --  75  --  66  "  < > >90 >90  --  >90   TOBIAS 8.3*  --   --  8.4*  --  9.2   < > 9.2 9.4  --  8.9   BILITOTAL  --   --   --   --   --   --   --  1.1 1.0  --  1.2   ALKPHOS  --   --   --   --   --   --   --  92 101  --  99   ALT  --   --   --   --   --   --   --  24 25  --  30   AST  --   --   --   --   --   --   --  29 35  --  35    < > = values in this interval not displayed.       Recent Labs   Lab Test 11/22/23  0513 11/21/23  0521 11/20/23  0528 11/17/23  0518 11/09/23  1107 07/20/23  0822 12/01/21  1404   MAG 2.0 2.0 1.9   < >  --   --   --    ALBUMIN  --   --   --   --  4.2 4.0 3.8   PHOS 2.2* 3.7 3.9   < >  --   --   --     < > = values in this interval not displayed.       ASSESSMENT:    6 Days Post-Op from Procedure(s):  hand-assisted laparoscopic right hemicolectomy.        PLAN:   Discharge home today

## 2023-11-23 NOTE — PLAN OF CARE
Goal Outcome Evaluation:             Patient discharged at 11AMvia ambulation accompanied by significant other and staff. Prescriptions sent to patients preferred pharmacy. All belongings sent with patient.     Discharge instructions reviewed with patient. Listed belongings gathered and returned to patient. All personal    Patient discharged to home.   Report called to na    Surgical Patient   Surgical Procedures during stay: hemicolectomy  Did patient receive discharge instruction on wound care and recognition of infection symptoms? Yes    MISC  Follow up appointment made:  Yes  Home medications returned to patient: N/A  Patient reports pain was well managed at discharge: Yes

## 2023-11-27 NOTE — PROGRESS NOTES
Assessment & Plan     Hospital discharge follow-up    Hyponatremia  - Basic metabolic panel    Gastroesophageal reflux disease, unspecified whether esophagitis present  - pantoprazole (PROTONIX) 40 MG EC tablet; Take 1 daily x4 weeks  - Hemoglobin and hematocrit    Hypokalemia  Found today while following his sodium level.  Start supplement, recheck 1 week.  - Magnesium  - potassium chloride ER (KLOR-CON M) 20 MEQ CR tablet; Take 2 tabs first dose, then once daily after that (total 8 days)       MED REC REQUIRED  Post Medication Reconciliation Status:  Discharge medications reconciled and changed, see notes/orders      LYLE HALE, [facility] - ALVARO Faustin   Christopher is a 73 year old, presenting for the following health issues:  Hospital F/U        11/28/2023    10:59 AM   Additional Questions   Roomed by Lalo Diams LPN   Accompanied by self         11/28/2023    10:59 AM   Patient Reported Additional Medications   Patient reports taking the following new medications none       HPI     73 year old male here today for a discharge follow-up.  He has a lap-hand assist right hemicolectomy due to polyp.  He's passing gas, most recent BM this morning.  BM's initially were dark, now back to normal color.  Bloating slowly improving.  Concern of ongoing heartburn, burping, reflux.  Was given TUMS while in hospital.  Sees surgery tomorrow.  Had low sodium that needs to be followed.    Hospital Follow-up Visit:    Hospital/Nursing Home/IP Rehab Facility: [facility]  Date of Admission: 11/16  Date of Discharge: 11/22  Reason(s) for Admission: Adenomatous Polyp of Ascending Colon    Was your hospitalization related to COVID-19? No   Problems taking medications regularly:  None  Medication changes since discharge: None  Problems adhering to non-medication therapy:  None    Summary of hospitalization:  [employer] discharge summary reviewed  Diagnostic Tests/Treatments  reviewed.  Follow up needed: sees surgery tomorrow  Other Healthcare Providers Involved in Patient s Care:         Specialist appointment - as above  Update since discharge: improved.         Plan of care communicated with patient           Review of Systems   Constitutional:  Negative for chills and fever.   Respiratory:  Negative for shortness of breath.    Cardiovascular:  Negative for peripheral edema.            Objective    /62 (BP Location: Left arm, Patient Position: Sitting, Cuff Size: Adult Regular)   Pulse 102   Temp 97.5  F (36.4  C) (Tympanic)   Resp 18   Wt 80.7 kg (178 lb)   SpO2 97%   BMI 27.06 kg/m    Body mass index is 27.06 kg/m .  Physical Exam  Constitutional:       General: He is not in acute distress.     Appearance: Normal appearance.   Eyes:      Conjunctiva/sclera: Conjunctivae normal.   Cardiovascular:      Rate and Rhythm: Normal rate and regular rhythm.      Heart sounds: Normal heart sounds. No murmur heard.  Pulmonary:      Effort: Pulmonary effort is normal.      Breath sounds: Normal breath sounds. No wheezing.   Abdominal:      General: Bowel sounds are normal.      Palpations: Abdomen is soft.      Tenderness: There is no abdominal tenderness.   Skin:     General: Skin is warm and dry.      Coloration: Skin is not jaundiced.      Comments: Incisions cdi, staples in place   Neurological:      Mental Status: He is alert and oriented to person, place, and time.

## 2023-11-28 ENCOUNTER — OFFICE VISIT (OUTPATIENT)
Dept: FAMILY MEDICINE | Facility: OTHER | Age: 73
End: 2023-11-28
Attending: FAMILY MEDICINE
Payer: COMMERCIAL

## 2023-11-28 VITALS
OXYGEN SATURATION: 97 % | TEMPERATURE: 97.5 F | DIASTOLIC BLOOD PRESSURE: 62 MMHG | HEART RATE: 102 BPM | SYSTOLIC BLOOD PRESSURE: 130 MMHG | BODY MASS INDEX: 27.06 KG/M2 | WEIGHT: 178 LBS | RESPIRATION RATE: 18 BRPM

## 2023-11-28 DIAGNOSIS — E87.6 HYPOKALEMIA: ICD-10-CM

## 2023-11-28 DIAGNOSIS — E87.1 HYPONATREMIA: ICD-10-CM

## 2023-11-28 DIAGNOSIS — K21.9 GASTROESOPHAGEAL REFLUX DISEASE, UNSPECIFIED WHETHER ESOPHAGITIS PRESENT: ICD-10-CM

## 2023-11-28 DIAGNOSIS — Z09 HOSPITAL DISCHARGE FOLLOW-UP: Primary | ICD-10-CM

## 2023-11-28 LAB
ANION GAP SERPL CALCULATED.3IONS-SCNC: 12 MMOL/L (ref 7–15)
BUN SERPL-MCNC: 4.2 MG/DL (ref 8–23)
CALCIUM SERPL-MCNC: 8.7 MG/DL (ref 8.8–10.2)
CHLORIDE SERPL-SCNC: 94 MMOL/L (ref 98–107)
CREAT SERPL-MCNC: 0.59 MG/DL (ref 0.67–1.17)
DEPRECATED HCO3 PLAS-SCNC: 27 MMOL/L (ref 22–29)
EGFRCR SERPLBLD CKD-EPI 2021: >90 ML/MIN/1.73M2
GLUCOSE SERPL-MCNC: 121 MG/DL (ref 70–99)
HCT VFR BLD AUTO: 40.1 % (ref 40–53)
HGB BLD-MCNC: 14.6 G/DL (ref 13.3–17.7)
MAGNESIUM SERPL-MCNC: 1.8 MG/DL (ref 1.7–2.3)
POTASSIUM SERPL-SCNC: 2.7 MMOL/L (ref 3.4–5.3)
SODIUM SERPL-SCNC: 133 MMOL/L (ref 135–145)

## 2023-11-28 PROCEDURE — G0463 HOSPITAL OUTPT CLINIC VISIT: HCPCS

## 2023-11-28 PROCEDURE — 36415 COLL VENOUS BLD VENIPUNCTURE: CPT | Mod: ZL | Performed by: FAMILY MEDICINE

## 2023-11-28 PROCEDURE — 83735 ASSAY OF MAGNESIUM: CPT | Mod: ZL | Performed by: FAMILY MEDICINE

## 2023-11-28 PROCEDURE — 99214 OFFICE O/P EST MOD 30 MIN: CPT | Performed by: FAMILY MEDICINE

## 2023-11-28 PROCEDURE — 85018 HEMOGLOBIN: CPT | Mod: ZL | Performed by: FAMILY MEDICINE

## 2023-11-28 PROCEDURE — 80048 BASIC METABOLIC PNL TOTAL CA: CPT | Mod: ZL | Performed by: FAMILY MEDICINE

## 2023-11-28 RX ORDER — POTASSIUM CHLORIDE 1500 MG/1
TABLET, EXTENDED RELEASE ORAL
Qty: 9 TABLET | Refills: 0 | Status: SHIPPED | OUTPATIENT
Start: 2023-11-28

## 2023-11-28 RX ORDER — PANTOPRAZOLE SODIUM 40 MG/1
TABLET, DELAYED RELEASE ORAL
Qty: 28 TABLET | Refills: 0 | Status: SHIPPED | OUTPATIENT
Start: 2023-11-28

## 2023-11-28 ASSESSMENT — PAIN SCALES - GENERAL: PAINLEVEL: NO PAIN (0)

## 2023-11-29 ENCOUNTER — OFFICE VISIT (OUTPATIENT)
Dept: SURGERY | Facility: OTHER | Age: 73
End: 2023-11-29
Attending: NURSE PRACTITIONER
Payer: COMMERCIAL

## 2023-11-29 VITALS
OXYGEN SATURATION: 96 % | TEMPERATURE: 98 F | DIASTOLIC BLOOD PRESSURE: 62 MMHG | HEART RATE: 101 BPM | SYSTOLIC BLOOD PRESSURE: 118 MMHG

## 2023-11-29 DIAGNOSIS — Z90.49 STATUS POST RIGHT HEMICOLECTOMY: Primary | ICD-10-CM

## 2023-11-29 PROCEDURE — 99024 POSTOP FOLLOW-UP VISIT: CPT | Performed by: NURSE PRACTITIONER

## 2023-11-29 PROCEDURE — G0463 HOSPITAL OUTPT CLINIC VISIT: HCPCS

## 2023-11-29 ASSESSMENT — PAIN SCALES - GENERAL: PAINLEVEL: NO PAIN (0)

## 2023-11-29 NOTE — PROGRESS NOTES
CLINIC NOTE - POST-OP SURGERY  11/29/2023    Patient:Christopher Valentine    Procedure: Hand Assisted Laparoscopic Right Hemicolectomy     This is a 73 year old male who is 2 weeks s/p Hand Assisted Laparoscopic Right Hemicolectomy.  The patient is having problems with acid reflux since the surgery and dysphagia.  He saw his PCP yesterday and was prescribed protonix which he plans to start taking today.  He is stooling without problems.    Current Medications:  Current Outpatient Medications   Medication Sig Dispense Refill    pantoprazole (PROTONIX) 40 MG EC tablet Take 1 daily x4 weeks 28 tablet 0    potassium chloride ER (KLOR-CON M) 20 MEQ CR tablet Take 2 tabs first dose, then once daily after that (total 8 days) 9 tablet 0       Allergies:  No Known Allergies    PHYSICAL EXAM:   Vital signs: /62 (BP Location: Right arm, Patient Position: Sitting, Cuff Size: Adult Regular)   Pulse 101   Temp 98  F (36.7  C) (Tympanic)   SpO2 96%    BMI: There is no height or weight on file to calculate BMI.   General: Normal, healthy, cooperative, in no acute distress, alert   Lungs: respirations are non-labored   Abdominal: non-distended   Wounds:  Well healed surgical scars consistent with his operation.     PATHOLOGY:  Case Report   Date Value Ref Range Status   11/16/2023   Final    Surgical Pathology Report                         Case: [id number]                                  Authorizing Provider:  Porfirio Matthew MD  Collected:           11/16/2023 08:32 AM          Ordering Location:     [facility] Main Operating Room     Received:            11/16/2023 01:53 PM          Pathologist:           Kurt Fuller DO                                                         Specimen:    Other, right colon                                                                          Final Diagnosis   Date Value Ref Range Status   11/16/2023   Final    A.  Terminal ileum, cecum and portion of ascending colon, right  hemicolectomy:  -Three (3) tubular adenomas in cecum and proximal ascending colon that range in size from 0.4 cm up to 2.4 cm.  -Submucosal lipoma in proximal ascending colon.  -Appendix with fibrofatty obliteration of the distal lumen.          ASSESSMENT:    73 year old male who is 2 weeks s/p Hand Assisted Laparoscopic Right Hemicolectomy.  GERD    PLAN:   Patient's staples were removed without problems.  Patient to take protonix as prescribed by Dr. Pillai for his GI complaints.  I would like him to follow up with me in 1 week for a recheck.  Sooner with problems/concerns.      Restrictions : Will continue lifting restrictions of no more than 10 pounds until 6 weeks after surgery    Patient will be put in recalls for a colonoscopy in 1 year.

## 2023-12-05 ASSESSMENT — ENCOUNTER SYMPTOMS
CHILLS: 0
FEVER: 0
SHORTNESS OF BREATH: 0

## 2023-12-06 ENCOUNTER — PATIENT OUTREACH (OUTPATIENT)
Dept: GASTROENTEROLOGY | Facility: CLINIC | Age: 73
End: 2023-12-06

## 2023-12-06 ENCOUNTER — OFFICE VISIT (OUTPATIENT)
Dept: SURGERY | Facility: OTHER | Age: 73
End: 2023-12-06
Attending: NURSE PRACTITIONER
Payer: COMMERCIAL

## 2023-12-06 VITALS
OXYGEN SATURATION: 99 % | BODY MASS INDEX: 26.98 KG/M2 | HEIGHT: 68 IN | DIASTOLIC BLOOD PRESSURE: 72 MMHG | HEART RATE: 97 BPM | RESPIRATION RATE: 16 BRPM | SYSTOLIC BLOOD PRESSURE: 124 MMHG | WEIGHT: 178 LBS | TEMPERATURE: 95.4 F

## 2023-12-06 DIAGNOSIS — Z90.49 STATUS POST RIGHT HEMICOLECTOMY: Primary | ICD-10-CM

## 2023-12-06 PROCEDURE — 99024 POSTOP FOLLOW-UP VISIT: CPT | Performed by: NURSE PRACTITIONER

## 2023-12-06 PROCEDURE — G0463 HOSPITAL OUTPT CLINIC VISIT: HCPCS

## 2023-12-06 ASSESSMENT — PAIN SCALES - GENERAL: PAINLEVEL: NO PAIN (0)

## 2023-12-06 NOTE — PATIENT INSTRUCTIONS
Thank you for allowing Aracelis Che CNP and our surgical team to participate in your care. Please call our health unit coordinator at [phone] with scheduling questions or the nurse at [phone] with any other questions or concerns.

## 2023-12-06 NOTE — PROGRESS NOTES
Assessment & Plan     Hypokalemia  K back to normal on recheck today.  Done with supplement.  - Basic metabolic panel    Gastroesophageal reflux disease, unspecified whether esophagitis present  Finish the 28 days of PPI, then done with it.  Follow-up as needed.        LYLE HALE,   [facility] - ALVARO White is a 73 year old, presenting for the following health issues:  Recheck Medication (HypoKalemia)        12/8/2023     9:27 AM   Additional Questions   Roomed by Key Tran   Accompanied by none         12/8/2023     9:27 AM   Patient Reported Additional Medications   Patient reports taking the following new medications none       HPI     Previously seen 11/28/23 for discharge follow-up.  Had low K - given supplement x7 days.  Also started on PPI for heartburn.    Feels great today, no longer bloated or heartburn.  Needs K rechecked.  Eating well.        Review of Systems   Constitutional:  Negative for fever.   Gastrointestinal:  Negative for abdominal distention, abdominal pain, constipation and diarrhea.            Objective    /82 (BP Location: Left arm, Patient Position: Sitting, Cuff Size: Adult Regular)   Pulse 97   Temp 97.9  F (36.6  C) (Tympanic)   SpO2 96%   There is no height or weight on file to calculate BMI.  Physical Exam  Constitutional:       General: He is not in acute distress.     Appearance: Normal appearance.   Abdominal:      General: Bowel sounds are normal. There is no distension.      Palpations: Abdomen is soft.      Tenderness: There is no abdominal tenderness. There is no guarding.   Neurological:      Mental Status: He is alert.

## 2023-12-06 NOTE — PROGRESS NOTES
"CLINIC NOTE - POST-OP SURGERY  12/6/2023    Patient:Christopher Valentine    Procedure: Hand Assisted Laparoscopic Right Hemicolectomy     This is a 73 year old male who is 3 weeks s/p Hand Assisted Laparoscopic Right Hemicolectomy.  The patient is without complaint when seen today.      Current Medications:  Current Outpatient Medications   Medication Sig Dispense Refill    pantoprazole (PROTONIX) 40 MG EC tablet Take 1 daily x4 weeks 28 tablet 0    potassium chloride ER (KLOR-CON M) 20 MEQ CR tablet Take 2 tabs first dose, then once daily after that (total 8 days) 9 tablet 0       Allergies:  No Known Allergies    PHYSICAL EXAM:   Vital signs: /72 (BP Location: Right arm, Cuff Size: Adult Regular)   Pulse 97   Temp (!) 95.4  F (35.2  C) (Tympanic)   Resp 16   Ht 1.727 m (5' 8\")   Wt 80.7 kg (178 lb)   SpO2 99%   BMI 27.06 kg/m     BMI: Body mass index is 27.06 kg/m .   General: Normal, healthy, cooperative, in no acute distress, alert   Lungs: respirations are non-labored   Abdominal: non-distended   Wounds:  Well healed surgical scars consistent with his operation.     PATHOLOGY:  Case Report   Date Value Ref Range Status   11/16/2023   Final    Surgical Pathology Report                         Case: [id number]                                  Authorizing Provider:  Porfirio Matthew MD  Collected:           11/16/2023 08:32 AM          Ordering Location:     [facility]     Received:            11/16/2023 01:53 PM          Pathologist:           Kurt Fuller DO                                                         Specimen:    Other, right colon                                                                          Final Diagnosis   Date Value Ref Range Status   11/16/2023   Final    A.  Terminal ileum, cecum and portion of ascending colon, right hemicolectomy:  -Three (3) tubular adenomas in cecum and proximal ascending colon that range in size from 0.4 cm up to 2.4 " cm.  -Submucosal lipoma in proximal ascending colon.  -Appendix with fibrofatty obliteration of the distal lumen.          ASSESSMENT:    73 year old male who is 3 weeks s/p Hand Assisted Laparoscopic Right Hemicolectomy.      PLAN:   Follow up as needed at this time.     Restrictions : Will continue lifting restrictions of no more than 10 pounds until 6 weeks after surgery    Patient will be put in recalls for a colonoscopy in 1 year.

## 2023-12-08 ENCOUNTER — OFFICE VISIT (OUTPATIENT)
Dept: FAMILY MEDICINE | Facility: OTHER | Age: 73
End: 2023-12-08
Attending: FAMILY MEDICINE
Payer: COMMERCIAL

## 2023-12-08 VITALS
DIASTOLIC BLOOD PRESSURE: 82 MMHG | TEMPERATURE: 97.9 F | SYSTOLIC BLOOD PRESSURE: 135 MMHG | HEART RATE: 97 BPM | OXYGEN SATURATION: 96 %

## 2023-12-08 DIAGNOSIS — K21.9 GASTROESOPHAGEAL REFLUX DISEASE, UNSPECIFIED WHETHER ESOPHAGITIS PRESENT: ICD-10-CM

## 2023-12-08 DIAGNOSIS — E87.6 HYPOKALEMIA: Primary | ICD-10-CM

## 2023-12-08 LAB
ANION GAP SERPL CALCULATED.3IONS-SCNC: 9 MMOL/L (ref 7–15)
BUN SERPL-MCNC: 2.8 MG/DL (ref 8–23)
CALCIUM SERPL-MCNC: 8.7 MG/DL (ref 8.8–10.2)
CHLORIDE SERPL-SCNC: 99 MMOL/L (ref 98–107)
CREAT SERPL-MCNC: 0.67 MG/DL (ref 0.67–1.17)
DEPRECATED HCO3 PLAS-SCNC: 27 MMOL/L (ref 22–29)
EGFRCR SERPLBLD CKD-EPI 2021: >90 ML/MIN/1.73M2
GLUCOSE SERPL-MCNC: 88 MG/DL (ref 70–99)
POTASSIUM SERPL-SCNC: 4.5 MMOL/L (ref 3.4–5.3)
SODIUM SERPL-SCNC: 135 MMOL/L (ref 135–145)

## 2023-12-08 PROCEDURE — G0463 HOSPITAL OUTPT CLINIC VISIT: HCPCS

## 2023-12-08 PROCEDURE — 36415 COLL VENOUS BLD VENIPUNCTURE: CPT | Mod: ZL | Performed by: FAMILY MEDICINE

## 2023-12-08 PROCEDURE — 99212 OFFICE O/P EST SF 10 MIN: CPT | Performed by: FAMILY MEDICINE

## 2023-12-08 PROCEDURE — 80048 BASIC METABOLIC PNL TOTAL CA: CPT | Mod: ZL | Performed by: FAMILY MEDICINE

## 2023-12-14 ASSESSMENT — ENCOUNTER SYMPTOMS
DIARRHEA: 0
ABDOMINAL DISTENTION: 0
CONSTIPATION: 0
FEVER: 0
ABDOMINAL PAIN: 0

## 2024-06-28 ENCOUNTER — PATIENT OUTREACH (OUTPATIENT)
Dept: GASTROENTEROLOGY | Facility: CLINIC | Age: 74
End: 2024-06-28

## 2024-06-28 DIAGNOSIS — Z12.11 SCREEN FOR COLON CANCER: Primary | ICD-10-CM

## 2024-06-28 NOTE — TELEPHONE ENCOUNTER
"CRC Screening Colonoscopy Referral Review    Patient meets the inclusion criteria for screening colonoscopy standing order.    Ordering/Referring Provider:  Di Orr     BMI: Estimated body mass index is 27.06 kg/m  as calculated from the following:    Height as of 12/6/23: 1.727 m (5' 8\").    Weight as of 12/6/23: 80.7 kg (178 lb).     Sedation:  Does patient have any of the following conditions affecting sedation?  Hx of chemical dependency: MAC sedation recommended    Previous Scopes:  Any previous recommendations or follow up needs based on previous scope?  na / No recommendations.    Medical Concerns to Postpone Order:  Does patient have any of the following medical concerns that should postpone/delay colonoscopy referral?  No medical conditions affecting colonoscopy referral.    Final Referral Details:  Based on patient's medical history patient is appropriate for referral order with MAC/deep sedation.   BMI<= 45 45 < BMI <= 48 48 < BMI < = 50  BMI > 50   No Restrictions No [facility]  No [facility]  [facility] with exceptions [facility] Only OR Only       Koki Mcneil RN on 6/28/2024 at 8:43 AM    "

## 2024-06-28 NOTE — LETTER
June 28, 2024      Christopher Valentine  [address]  [place] [address]              Liset White,    We hope this letter finds you doing well.     Your health is important to us at [facility]. Our records indicate that you could be due, or possibly overdue, for a screening or surveillance colonoscopy if you have not had a colonoscopy since 2023.     An order has been placed for you to have this completed. Our scheduling team will be reaching out to you to assist in getting this scheduled. If you do not hear from them within 7 days or you would like to schedule sooner, please call [phone] - [facility]     If you believe this is in error and have already had a colonoscopy done, please have your results and recommendations faxed to [phone].    If you have questions about this order or have further concerns, please reach out to your primary care provider.     Ishan     Colorectal Cancer RN Screening Team  [facility]

## 2024-07-01 ENCOUNTER — TELEPHONE (OUTPATIENT)
Dept: FAMILY MEDICINE | Facility: OTHER | Age: 74
End: 2024-07-01

## 2024-11-06 ENCOUNTER — TELEPHONE (OUTPATIENT)
Dept: FAMILY MEDICINE | Facility: OTHER | Age: 74
End: 2024-11-06

## 2025-03-05 ENCOUNTER — HOSPITAL ENCOUNTER (OUTPATIENT)
Facility: HOSPITAL | Age: 75
Setting detail: OBSERVATION
End: 2025-03-05
Attending: INTERNAL MEDICINE | Admitting: INTERNAL MEDICINE
Payer: COMMERCIAL

## 2025-03-05 DIAGNOSIS — S32.9XXA CLOSED NONDISPLACED FRACTURE OF PELVIS, UNSPECIFIED PART OF PELVIS, INITIAL ENCOUNTER (H): Primary | ICD-10-CM

## 2025-03-05 DIAGNOSIS — S32.810A MULTIPLE CLOSED FRACTURES OF PELVIS WITH STABLE DISRUPTION OF PELVIC RING, INITIAL ENCOUNTER (H): ICD-10-CM

## 2025-03-05 PROCEDURE — 99285 EMERGENCY DEPT VISIT HI MDM: CPT | Performed by: INTERNAL MEDICINE

## 2025-03-05 ASSESSMENT — COLUMBIA-SUICIDE SEVERITY RATING SCALE - C-SSRS
6. HAVE YOU EVER DONE ANYTHING, STARTED TO DO ANYTHING, OR PREPARED TO DO ANYTHING TO END YOUR LIFE?: NO
1. IN THE PAST MONTH, HAVE YOU WISHED YOU WERE DEAD OR WISHED YOU COULD GO TO SLEEP AND NOT WAKE UP?: NO
2. HAVE YOU ACTUALLY HAD ANY THOUGHTS OF KILLING YOURSELF IN THE PAST MONTH?: NO

## 2025-03-06 ENCOUNTER — APPOINTMENT (OUTPATIENT)
Dept: GENERAL RADIOLOGY | Facility: HOSPITAL | Age: 75
End: 2025-03-06
Attending: INTERNAL MEDICINE
Payer: COMMERCIAL

## 2025-03-06 ENCOUNTER — APPOINTMENT (OUTPATIENT)
Dept: OCCUPATIONAL THERAPY | Facility: HOSPITAL | Age: 75
End: 2025-03-06
Attending: INTERNAL MEDICINE
Payer: COMMERCIAL

## 2025-03-06 ENCOUNTER — APPOINTMENT (OUTPATIENT)
Dept: GENERAL RADIOLOGY | Facility: HOSPITAL | Age: 75
End: 2025-03-06
Attending: SURGERY
Payer: COMMERCIAL

## 2025-03-06 ENCOUNTER — APPOINTMENT (OUTPATIENT)
Dept: PHYSICAL THERAPY | Facility: HOSPITAL | Age: 75
End: 2025-03-06
Attending: INTERNAL MEDICINE
Payer: COMMERCIAL

## 2025-03-06 ENCOUNTER — APPOINTMENT (OUTPATIENT)
Dept: CT IMAGING | Facility: HOSPITAL | Age: 75
End: 2025-03-06
Attending: INTERNAL MEDICINE
Payer: COMMERCIAL

## 2025-03-06 VITALS
SYSTOLIC BLOOD PRESSURE: 146 MMHG | WEIGHT: 182.76 LBS | HEART RATE: 101 BPM | OXYGEN SATURATION: 95 % | DIASTOLIC BLOOD PRESSURE: 87 MMHG | BODY MASS INDEX: 27.7 KG/M2 | HEIGHT: 68 IN | RESPIRATION RATE: 18 BRPM | TEMPERATURE: 100.1 F

## 2025-03-06 PROBLEM — S32.9XXA PELVIC FRACTURE (H): Status: ACTIVE | Noted: 2025-03-06

## 2025-03-06 PROBLEM — S32.810A MULTIPLE CLOSED FRACTURES OF PELVIS WITH STABLE DISRUPTION OF PELVIC RING, INITIAL ENCOUNTER (H): Status: ACTIVE | Noted: 2025-03-06

## 2025-03-06 LAB
ABO + RH BLD: NORMAL
ALBUMIN SERPL BCG-MCNC: 3.5 G/DL (ref 3.5–5.2)
ALBUMIN UR-MCNC: NEGATIVE MG/DL
ALP SERPL-CCNC: 164 U/L (ref 40–150)
ALT SERPL W P-5'-P-CCNC: 57 U/L (ref 0–70)
ANION GAP SERPL CALCULATED.3IONS-SCNC: 11 MMOL/L (ref 7–15)
ANION GAP SERPL CALCULATED.3IONS-SCNC: 11 MMOL/L (ref 7–15)
APPEARANCE UR: CLEAR
AST SERPL W P-5'-P-CCNC: 69 U/L (ref 0–45)
BASOPHILS # BLD AUTO: 0.1 10E3/UL (ref 0–0.2)
BASOPHILS NFR BLD AUTO: 0 %
BILIRUB SERPL-MCNC: 0.5 MG/DL
BILIRUB UR QL STRIP: NEGATIVE
BLD GP AB SCN SERPL QL: NEGATIVE
BUN SERPL-MCNC: 8.4 MG/DL (ref 8–23)
BUN SERPL-MCNC: 9.7 MG/DL (ref 8–23)
CALCIUM SERPL-MCNC: 7.8 MG/DL (ref 8.8–10.4)
CALCIUM SERPL-MCNC: 8.3 MG/DL (ref 8.8–10.4)
CHLORIDE SERPL-SCNC: 101 MMOL/L (ref 98–107)
CHLORIDE SERPL-SCNC: 99 MMOL/L (ref 98–107)
COLOR UR AUTO: NORMAL
CREAT SERPL-MCNC: 0.64 MG/DL (ref 0.67–1.17)
CREAT SERPL-MCNC: 0.75 MG/DL (ref 0.67–1.17)
EGFRCR SERPLBLD CKD-EPI 2021: >90 ML/MIN/1.73M2
EGFRCR SERPLBLD CKD-EPI 2021: >90 ML/MIN/1.73M2
EOSINOPHIL # BLD AUTO: 0.1 10E3/UL (ref 0–0.7)
EOSINOPHIL NFR BLD AUTO: 1 %
ERYTHROCYTE [DISTWIDTH] IN BLOOD BY AUTOMATED COUNT: 11.9 % (ref 10–15)
ERYTHROCYTE [DISTWIDTH] IN BLOOD BY AUTOMATED COUNT: 12 % (ref 10–15)
GLUCOSE SERPL-MCNC: 115 MG/DL (ref 70–99)
GLUCOSE SERPL-MCNC: 131 MG/DL (ref 70–99)
GLUCOSE UR STRIP-MCNC: NEGATIVE MG/DL
HCO3 SERPL-SCNC: 21 MMOL/L (ref 22–29)
HCO3 SERPL-SCNC: 23 MMOL/L (ref 22–29)
HCT VFR BLD AUTO: 37.6 % (ref 40–53)
HCT VFR BLD AUTO: 41.1 % (ref 40–53)
HGB BLD-MCNC: 13.2 G/DL (ref 13.3–17.7)
HGB BLD-MCNC: 14.3 G/DL (ref 13.3–17.7)
HGB UR QL STRIP: NEGATIVE
HOLD SPECIMEN: NORMAL
IMM GRANULOCYTES # BLD: 0.1 10E3/UL
IMM GRANULOCYTES NFR BLD: 1 %
KETONES UR STRIP-MCNC: NEGATIVE MG/DL
LEUKOCYTE ESTERASE UR QL STRIP: NEGATIVE
LYMPHOCYTES # BLD AUTO: 1.9 10E3/UL (ref 0.8–5.3)
LYMPHOCYTES NFR BLD AUTO: 13 %
MCH RBC QN AUTO: 32.8 PG (ref 26.5–33)
MCH RBC QN AUTO: 33.1 PG (ref 26.5–33)
MCHC RBC AUTO-ENTMCNC: 34.8 G/DL (ref 31.5–36.5)
MCHC RBC AUTO-ENTMCNC: 35.1 G/DL (ref 31.5–36.5)
MCV RBC AUTO: 94 FL (ref 78–100)
MCV RBC AUTO: 95 FL (ref 78–100)
MONOCYTES # BLD AUTO: 1 10E3/UL (ref 0–1.3)
MONOCYTES NFR BLD AUTO: 7 %
NEUTROPHILS # BLD AUTO: 11.1 10E3/UL (ref 1.6–8.3)
NEUTROPHILS NFR BLD AUTO: 78 %
NITRATE UR QL: NEGATIVE
NRBC # BLD AUTO: 0 10E3/UL
NRBC BLD AUTO-RTO: 0 /100
PH UR STRIP: 5 [PH] (ref 4.7–8)
PLATELET # BLD AUTO: 178 10E3/UL (ref 150–450)
PLATELET # BLD AUTO: 207 10E3/UL (ref 150–450)
POTASSIUM SERPL-SCNC: 3.5 MMOL/L (ref 3.4–5.3)
POTASSIUM SERPL-SCNC: 3.9 MMOL/L (ref 3.4–5.3)
PROT SERPL-MCNC: 6.6 G/DL (ref 6.4–8.3)
RBC # BLD AUTO: 4.02 10E6/UL (ref 4.4–5.9)
RBC # BLD AUTO: 4.32 10E6/UL (ref 4.4–5.9)
RBC URINE: <1 /HPF
SODIUM SERPL-SCNC: 133 MMOL/L (ref 135–145)
SODIUM SERPL-SCNC: 133 MMOL/L (ref 135–145)
SP GR UR STRIP: 1.01 (ref 1–1.03)
SPECIMEN EXP DATE BLD: NORMAL
SQUAMOUS EPITHELIAL: 0 /HPF
UROBILINOGEN UR STRIP-MCNC: NORMAL MG/DL
WBC # BLD AUTO: 10.4 10E3/UL (ref 4–11)
WBC # BLD AUTO: 14.2 10E3/UL (ref 4–11)
WBC URINE: 1 /HPF

## 2025-03-06 PROCEDURE — 94799 UNLISTED PULMONARY SVC/PX: CPT

## 2025-03-06 PROCEDURE — 96376 TX/PRO/DX INJ SAME DRUG ADON: CPT

## 2025-03-06 PROCEDURE — G0378 HOSPITAL OBSERVATION PER HR: HCPCS

## 2025-03-06 PROCEDURE — 82310 ASSAY OF CALCIUM: CPT | Performed by: INTERNAL MEDICINE

## 2025-03-06 PROCEDURE — 96374 THER/PROPH/DIAG INJ IV PUSH: CPT

## 2025-03-06 PROCEDURE — 36415 COLL VENOUS BLD VENIPUNCTURE: CPT | Performed by: INTERNAL MEDICINE

## 2025-03-06 PROCEDURE — 258N000003 HC RX IP 258 OP 636: Performed by: INTERNAL MEDICINE

## 2025-03-06 PROCEDURE — 85025 COMPLETE CBC W/AUTO DIFF WBC: CPT | Performed by: INTERNAL MEDICINE

## 2025-03-06 PROCEDURE — 999N000157 HC STATISTIC RCP TIME EA 10 MIN

## 2025-03-06 PROCEDURE — 85027 COMPLETE CBC AUTOMATED: CPT | Performed by: INTERNAL MEDICINE

## 2025-03-06 PROCEDURE — 99214 OFFICE O/P EST MOD 30 MIN: CPT | Mod: 25 | Performed by: SURGERY

## 2025-03-06 PROCEDURE — 99222 1ST HOSP IP/OBS MODERATE 55: CPT | Performed by: INTERNAL MEDICINE

## 2025-03-06 PROCEDURE — 81003 URINALYSIS AUTO W/O SCOPE: CPT | Performed by: SURGERY

## 2025-03-06 PROCEDURE — 86850 RBC ANTIBODY SCREEN: CPT | Performed by: INTERNAL MEDICINE

## 2025-03-06 PROCEDURE — 80053 COMPREHEN METABOLIC PANEL: CPT | Performed by: INTERNAL MEDICINE

## 2025-03-06 PROCEDURE — 71045 X-RAY EXAM CHEST 1 VIEW: CPT

## 2025-03-06 PROCEDURE — 250N000013 HC RX MED GY IP 250 OP 250 PS 637: Performed by: NURSE PRACTITIONER

## 2025-03-06 PROCEDURE — 97165 OT EVAL LOW COMPLEX 30 MIN: CPT | Mod: GO

## 2025-03-06 PROCEDURE — 70450 CT HEAD/BRAIN W/O DYE: CPT

## 2025-03-06 PROCEDURE — 250N000011 HC RX IP 250 OP 636: Performed by: NURSE PRACTITIONER

## 2025-03-06 PROCEDURE — 86900 BLOOD TYPING SEROLOGIC ABO: CPT | Performed by: INTERNAL MEDICINE

## 2025-03-06 PROCEDURE — 73502 X-RAY EXAM HIP UNI 2-3 VIEWS: CPT

## 2025-03-06 PROCEDURE — 97161 PT EVAL LOW COMPLEX 20 MIN: CPT | Mod: GP

## 2025-03-06 PROCEDURE — 250N000013 HC RX MED GY IP 250 OP 250 PS 637: Performed by: INTERNAL MEDICINE

## 2025-03-06 RX ORDER — NALOXONE HYDROCHLORIDE 0.4 MG/ML
0.4 INJECTION, SOLUTION INTRAMUSCULAR; INTRAVENOUS; SUBCUTANEOUS
Status: DISCONTINUED | OUTPATIENT
Start: 2025-03-06 | End: 2025-03-07 | Stop reason: HOSPADM

## 2025-03-06 RX ORDER — ONDANSETRON 2 MG/ML
4 INJECTION INTRAMUSCULAR; INTRAVENOUS EVERY 6 HOURS PRN
Status: DISCONTINUED | OUTPATIENT
Start: 2025-03-06 | End: 2025-03-07 | Stop reason: HOSPADM

## 2025-03-06 RX ORDER — KETOROLAC TROMETHAMINE 15 MG/ML
15 INJECTION, SOLUTION INTRAMUSCULAR; INTRAVENOUS EVERY 6 HOURS
Status: DISCONTINUED | OUTPATIENT
Start: 2025-03-06 | End: 2025-03-07 | Stop reason: HOSPADM

## 2025-03-06 RX ORDER — ONDANSETRON 4 MG/1
4 TABLET, ORALLY DISINTEGRATING ORAL EVERY 6 HOURS PRN
Status: DISCONTINUED | OUTPATIENT
Start: 2025-03-06 | End: 2025-03-07 | Stop reason: HOSPADM

## 2025-03-06 RX ORDER — POLYETHYLENE GLYCOL 3350 17 G/17G
17 POWDER, FOR SOLUTION ORAL 2 TIMES DAILY PRN
Status: DISCONTINUED | OUTPATIENT
Start: 2025-03-06 | End: 2025-03-07 | Stop reason: HOSPADM

## 2025-03-06 RX ORDER — ACETAMINOPHEN 325 MG/1
975 TABLET ORAL 3 TIMES DAILY
Status: DISCONTINUED | OUTPATIENT
Start: 2025-03-06 | End: 2025-03-07 | Stop reason: HOSPADM

## 2025-03-06 RX ORDER — ACETAMINOPHEN 650 MG/1
650 SUPPOSITORY RECTAL EVERY 4 HOURS PRN
Status: DISCONTINUED | OUTPATIENT
Start: 2025-03-06 | End: 2025-03-06

## 2025-03-06 RX ORDER — AMOXICILLIN 250 MG
1 CAPSULE ORAL 2 TIMES DAILY PRN
Status: DISCONTINUED | OUTPATIENT
Start: 2025-03-06 | End: 2025-03-07 | Stop reason: HOSPADM

## 2025-03-06 RX ORDER — AMOXICILLIN 250 MG
2 CAPSULE ORAL 2 TIMES DAILY PRN
Status: DISCONTINUED | OUTPATIENT
Start: 2025-03-06 | End: 2025-03-07 | Stop reason: HOSPADM

## 2025-03-06 RX ORDER — OXYCODONE HYDROCHLORIDE 5 MG/1
5 TABLET ORAL EVERY 4 HOURS PRN
Status: DISCONTINUED | OUTPATIENT
Start: 2025-03-06 | End: 2025-03-07 | Stop reason: HOSPADM

## 2025-03-06 RX ORDER — NALOXONE HYDROCHLORIDE 0.4 MG/ML
0.2 INJECTION, SOLUTION INTRAMUSCULAR; INTRAVENOUS; SUBCUTANEOUS
Status: DISCONTINUED | OUTPATIENT
Start: 2025-03-06 | End: 2025-03-07 | Stop reason: HOSPADM

## 2025-03-06 RX ORDER — ACETAMINOPHEN 325 MG/1
650 TABLET ORAL EVERY 4 HOURS PRN
Status: DISCONTINUED | OUTPATIENT
Start: 2025-03-06 | End: 2025-03-06

## 2025-03-06 RX ORDER — PROCHLORPERAZINE MALEATE 5 MG/1
5 TABLET ORAL EVERY 6 HOURS PRN
Status: DISCONTINUED | OUTPATIENT
Start: 2025-03-06 | End: 2025-03-07 | Stop reason: HOSPADM

## 2025-03-06 RX ORDER — OXYCODONE HYDROCHLORIDE 5 MG/1
5 TABLET ORAL EVERY 4 HOURS PRN
Status: DISCONTINUED | OUTPATIENT
Start: 2025-03-06 | End: 2025-03-06

## 2025-03-06 RX ADMIN — SODIUM CHLORIDE 500 ML: 9 INJECTION, SOLUTION INTRAVENOUS at 03:32

## 2025-03-06 RX ADMIN — OXYCODONE HYDROCHLORIDE 5 MG: 5 TABLET ORAL at 10:53

## 2025-03-06 RX ADMIN — KETOROLAC TROMETHAMINE 15 MG: 15 INJECTION, SOLUTION INTRAMUSCULAR; INTRAVENOUS at 18:34

## 2025-03-06 RX ADMIN — KETOROLAC TROMETHAMINE 15 MG: 15 INJECTION, SOLUTION INTRAMUSCULAR; INTRAVENOUS at 12:39

## 2025-03-06 RX ADMIN — ACETAMINOPHEN 975 MG: 325 TABLET, FILM COATED ORAL at 20:03

## 2025-03-06 RX ADMIN — OXYCODONE HYDROCHLORIDE 2.5 MG: 5 TABLET ORAL at 02:54

## 2025-03-06 RX ADMIN — ACETAMINOPHEN 650 MG: 325 TABLET, FILM COATED ORAL at 02:53

## 2025-03-06 ASSESSMENT — ENCOUNTER SYMPTOMS
COUGH: 0
DYSURIA: 0
NUMBNESS: 0
FEVER: 0
FREQUENCY: 0
BACK PAIN: 0
VOICE CHANGE: 0
VOMITING: 0
SLEEP DISTURBANCE: 0
LIGHT-HEADEDNESS: 0
CONFUSION: 0
CHILLS: 0
NAUSEA: 0
ABDOMINAL DISTENTION: 0
ABDOMINAL PAIN: 0
ANAL BLEEDING: 0
FLANK PAIN: 0
PALPITATIONS: 0
COLOR CHANGE: 0
MYALGIAS: 0
NECK PAIN: 0
HEADACHES: 0
CHEST TIGHTNESS: 0
WEAKNESS: 0
BLOOD IN STOOL: 0
SHORTNESS OF BREATH: 0
DIAPHORESIS: 0
WHEEZING: 0
DIZZINESS: 0

## 2025-03-06 ASSESSMENT — ACTIVITIES OF DAILY LIVING (ADL)
ADLS_ACUITY_SCORE: 34
ADLS_ACUITY_SCORE: 38
ADLS_ACUITY_SCORE: 38
ADLS_ACUITY_SCORE: 34
ADLS_ACUITY_SCORE: 35
ADLS_ACUITY_SCORE: 34
ADLS_ACUITY_SCORE: 38
ADLS_ACUITY_SCORE: 34
ADLS_ACUITY_SCORE: 38
PREVIOUS_RESPONSIBILITIES: MEAL PREP;HOUSEKEEPING;LAUNDRY;MEDICATION MANAGEMENT;DRIVING
ADLS_ACUITY_SCORE: 38
ADLS_ACUITY_SCORE: 34
ADLS_ACUITY_SCORE: 38
ADLS_ACUITY_SCORE: 55
ADLS_ACUITY_SCORE: 38
ADLS_ACUITY_SCORE: 34
ADLS_ACUITY_SCORE: 38
ADLS_ACUITY_SCORE: 55
ADLS_ACUITY_SCORE: 34
ADLS_ACUITY_SCORE: 34
ADLS_ACUITY_SCORE: 38
ADLS_ACUITY_SCORE: 38

## 2025-03-06 NOTE — H&P
"[facility]    History and Physical - Hospitalist Service       Date of Admission:  3/5/2025    Assessment & Plan    Superior and inferior pubic rami fractures  Mechanical fall  -pain meds prn  -PT/OT in AM  -no surgical intervention per Ortho  -CT head: no acute findings    Hyponatremia, mild  - mL bolus now    Leukocytosis  -may be reactive, no obvious infection  -CBC in AM      Diet: Regular Diet Adult    DVT Prophylaxis:   Boyd Catheter: Not present  Lines: None     Code Status: No CPR- Do NOT Intubate      Clinically Significant Risk Factors Present on Admission                  # Overweight: Estimated body mass index is 27.79 kg/m  as calculated from the following:    Height as of this encounter: 1.727 m (5' 8\").    Weight as of this encounter: 82.9 kg (182 lb 12.2 oz).              Disposition Plan      Expected Discharge Date: 03/07/2025                The patient's care was discussed with the Patient.        TOM MNOTENEGRO MD  [facility]  Securely message with the Vocera Web Console (learn more here)  Text page via [facility] Paging/Directory      Visit/Communication Style   Virtual (Video) communication was used to evaluate Christopher.  Christopher consented to the use of video communication: yes  Video START time: 0307, 3/6/2025  Video STOP time: 0315, 3/6/2025   Patient's location: [facility]   Provider's location during the visit: [facility] Tele-medicine site        ______________________________________________________________________    Chief Complaint   Fall    History of Present Illness   74yoM with GERD and HLD presented to the ED after a fall on the ice this evening.  He slipped and landed on his right side.  He was able to pull himself up and get back to the house.  He has pain in the right hip.  He denies other injuries.    Review of Systems    General: negative for fever, chills, sweats, weakness  Eyes: negative for blurred vision, loss of vision  Ear Nose and " Throat: negative for pharyngitis, speech or swallowing difficulties  Respiratory:  negative for sputum production, wheezing, ESTRELLA, pleuritic pain, sob or cough  Cardiology:  negative for chest pain, palpitations, orthopnea, PND, edema, syncope   Gastrointestinal: negative for abdominal pain, nausea, vomiting, diarrhea, constipation, hematemesis, melena or hematochezia  Genitourinary: negative for frequency, urgency, dysuria, hematuria   Neurological: negative for focal weakness, paresthesia    Past Medical History    I have reviewed this patient's medical history and updated it with pertinent information if needed.   Past Medical History:   Diagnosis Date    Alcohol Abuse 08/02/2011    Dyslipidemia 08/16/2011       Past Surgical History   I have reviewed this patient's surgical history and updated it with pertinent information if needed.  Past Surgical History:   Procedure Laterality Date    ANKLE SURGERY  2000    LT, hardware removal and ankle fusion; traumatic arthritis    ARTHROPLASTY KNEE Right 12/15/2021    Procedure: RIGHT TOTAL KNEE ARTHROPLASTY;  Surgeon: Cruz Vasquez MD;  Location: [facility]    COLONOSCOPY  2011    with polypectomy    COLONOSCOPY N/A 4/15/2021    Procedure: Colonoscopy, with  polypectomy;  Surgeon: Sam Smith MD;  Location: [facility]    COLONOSCOPY N/A 9/28/2023    Procedure: COLONOSCOPY with polypectomy and tatooing 120cm colon;  Surgeon: Porfirio Matthew MD;  Location: [facility]       Social History   I have reviewed this patient's social history and updated it with pertinent information if needed.  Social History     Tobacco Use    Smoking status: Former     Types: Cigarettes     Start date: 1967    Smokeless tobacco: Never    Tobacco comments:     Has not smoked for last 2 weeks   Vaping Use    Vaping status: Never Used   Substance Use Topics    Alcohol use: Yes     Alcohol/week: 11.0 standard drinks of alcohol     Types: 11 Cans of beer per week     Comment: beer daily     Drug use:  Never       Family History   I have reviewed this patient's family history and updated it with pertinent information if needed.  Family History   Problem Relation Age of Onset    Alzheimer Disease Mother     Colon Cancer Father          age 70 colon cancer    Cancer - colorectal Father         (cause of death)        Prior to Admission Medications   Prior to Admission Medications   Prescriptions Last Dose Informant Patient Reported? Taking?   pantoprazole (PROTONIX) 40 MG EC tablet More than a month  No Yes   Sig: Take 1 daily x4 weeks   potassium chloride ER (KLOR-CON M) 20 MEQ CR tablet More than a month  No Yes   Sig: Take 2 tabs first dose, then once daily after that (total 8 days)      Facility-Administered Medications: None     Allergies   No Known Allergies    Physical Exam   Vital Signs: Temp: 97.5  F (36.4  C) Temp src: Tympanic BP: 132/68 Pulse: 98   Resp: 18 SpO2: 94 % O2 Device: None (Room air)    Weight: 182 lbs 12.18 oz    Gen:  Well-developed, well-nourished, in no acute distress, lying semi-supine in hospital stretcher  HEENT:  Anicteric sclera, PER, hearing intact to voice  Resp:  No accessory muscle use, breath sounds clear; no wheezes no rales no rhonchi  Card:  No murmur, normal S1, S2   Abd:  Soft per RN exam, no TTP, non-distended, normoactive bowel sounds are present  Musc:  Normal strength and movement of the major muscle groups without obvious deformity  Psych:  Good insight, oriented to person, place and time, not anxious, not agitated    Data     Recent Labs   Lab 25  0029   WBC 14.2*   HGB 14.3   MCV 95      *   POTASSIUM 3.5   CHLORIDE 99   CO2 23   BUN 9.7   CR 0.75   ANIONGAP 11   TOBIAS 8.3*   *   ALBUMIN 3.5   PROTTOTAL 6.6   BILITOTAL 0.5   ALKPHOS 164*   ALT 57   AST 69*         Recent Results (from the past 24 hours)   CT Head w/o Contrast    Narrative    EXAM: CT HEAD W/O CONTRAST  LOCATION: [facility]  DATE: 3/6/2025    INDICATION:  fall, head injury  COMPARISON: None.  TECHNIQUE: Routine CT Head without IV contrast. Multiplanar reformats. Dose reduction techniques were used.    FINDINGS:  INTRACRANIAL CONTENTS: No intracranial hemorrhage, extraaxial collection, or mass effect.  No CT evidence of acute infarct. There is scattered low-attenuation within the periventricular and subcortical white matter consistent with minimal small vessel   ischemic disease. The ventricular system, basal cisterns and the cortical sulci are consistent with minimal volume loss for age.     VISUALIZED ORBITS/SINUSES/MASTOIDS: No intraorbital abnormality. No paranasal sinus mucosal disease. No middle ear or mastoid effusion.    BONES/SOFT TISSUES: Calvarium intact. Mild scalp swelling parietal region near vertex.      Impression    IMPRESSION:  1.  No CT finding of a mass, hemorrhage or focal area suggestive of acute infarct.  2.  Minimal age related changes.     XR Pelvis including Hip Right 2-3 Views    Narrative    EXAM: XR PELVIS AND HIP RIGHT 2 VIEWS  [facility]  DATE: 3/6/2025    INDICATION: fall, hip pain  COMPARISON: Abdomen radiographs 11/19/2023.      Impression    IMPRESSION: Acute displaced fractures of the right superior and inferior pubic rami. No dislocation. Degenerative osteoarthritis of the hips, moderate on the left and mild on the right. Vascular calcifications.

## 2025-03-06 NOTE — PLAN OF CARE
Alert and oriented. Moderate pain to back/hip/right leg, PRN tylenol and oxycodone given. Remained in bed this shift. Used urinal in bed. Received 500mL fluid bolus. Vitals and assessments as charted. Uses call light appropriately.     Face to face report given with opportunity to observe patient.    Report given to Ninoska Cardenas RN   3/6/2025  7:18 AM

## 2025-03-06 NOTE — PLAN OF CARE
Plan of Care Reviewed With: Patient    Overall Patient Progress:Progressing    Pt A&O, VS as charted. Complaints of Right hip pain w/ movement. Managed w/ PRN oxycodone, Scheduled Toradol, and rest. CMS intact to BLE. Trace edema to BLE. LS clear, HRR, BS active. Up w/ Ax1. Voiding adequately. Weight bearing as tolerated. Call light within reach, bed alarm active, makes needs known.      Face to face report given with opportunity to observe patient.    Report given to Kain Watson RN   3/6/2025  7:05 PM

## 2025-03-06 NOTE — CONSULTS
"[facility]    History and Physical / Consult note: Trauma Service       Date of Admission:  3/5/2025    Time of Admission/Consult Request (page/call): 115 3/6/2025    Time of my evaluation: 800 3/6/2025  Consulting services:  Orthopedics - Non-emergent consult: Called by ED    Assessment & Plan   Trauma mechanism:Fall from standing   Time/date of injury: 2030 3/5/2025  Known Injuries:  Superior and inferior pubic rami fracture   Other diagnoses:   ETOH abuse       Procedure:    Plan:  CXR and UA   Defer to Ortho for weight bearing and follow up.         ETOH: This patient was asked if in the last 3-6 months there has been a time when he had  5 or more drinks in a single day/outing.. Patient answer to the screening question was in the positive. Spoke with the patient about the correlation of ETOH use and accidents/injuries. We also discussed the importance of abstaining from ETOH use while healing from existing injuries, especially if prescribed narcotics at the time of discharge. The patient demonstrated understanding.  Primary Care Physician   Di Orr    Chief Complaint   Right hip pain     History is obtained from the patient    Clinically Significant Risk Factors Present on Admission         # Hyponatremia: Lowest Na = 133 mmol/L in last 2 days, will monitor as appropriate   # Hypocalcemia: Lowest Ca = 7.8 mg/dL in last 2 days, will monitor and replace as appropriate                   # Overweight: Estimated body mass index is 27.79 kg/m  as calculated from the following:    Height as of this encounter: 1.727 m (5' 8\").    Weight as of this encounter: 82.9 kg (182 lb 12.2 oz).                History of Present Illness   Christopher Valentine is a 74 year old male who presents by EMS after a fall from standing while walking his dogs down the driveway. He remembers the fall slipped on the ice. No LOC, was able to get back to the house where wife did call EMS. He does admit to drinkin last night, " admits to drinking 6-8 beers per day. He denies pain other than in his right hip. Has had a left ankle fusion and right total knee replacement and right hemicolectomy. C-spine cleared in ED     Past Medical History    I have reviewed this patient's medical history and updated it with pertinent information if needed.   Past Medical History:   Diagnosis Date    Alcohol Abuse 08/02/2011    Dyslipidemia 08/16/2011       Past Surgical History   I have reviewed this patient's surgical history and updated it with pertinent information if needed.  Past Surgical History:   Procedure Laterality Date    ANKLE SURGERY  2000    LT, hardware removal and ankle fusion; traumatic arthritis    ARTHROPLASTY KNEE Right 12/15/2021    Procedure: RIGHT TOTAL KNEE ARTHROPLASTY;  Surgeon: Cruz Vasquez MD;  Location: [facility]    COLONOSCOPY  2011    with polypectomy    COLONOSCOPY N/A 4/15/2021    Procedure: Colonoscopy, with  polypectomy;  Surgeon: Sam Smith MD;  Location: [facility]    COLONOSCOPY N/A 9/28/2023    Procedure: COLONOSCOPY with polypectomy and tatooing 120cm colon;  Surgeon: Porfirio Matthew MD;  Location: [facility] OR     Prior to Admission Medications   Prior to Admission Medications   Prescriptions Last Dose Informant Patient Reported? Taking?   pantoprazole (PROTONIX) 40 MG EC tablet More than a month  No Yes   Sig: Take 1 daily x4 weeks   potassium chloride ER (KLOR-CON M) 20 MEQ CR tablet More than a month  No Yes   Sig: Take 2 tabs first dose, then once daily after that (total 8 days)      Facility-Administered Medications: None     Allergies   No Known Allergies    Social History   Social History     Socioeconomic History    Marital status:      Spouse name: Not on file    Number of children: Not on file    Years of education: Not on file    Highest education level: Not on file   Occupational History     Employer: RETIRED   Tobacco Use    Smoking status: Former     Types: Cigarettes     Start date: 1967     Smokeless tobacco: Never    Tobacco comments:     Has not smoked for last 2 weeks   Vaping Use    Vaping status: Never Used   Substance and Sexual Activity    Alcohol use: Yes     Alcohol/week: 11.0 standard drinks of alcohol     Types: 11 Cans of beer per week     Comment: beer daily     Drug use: Never    Sexual activity: Not on file   Other Topics Concern     Service Not Asked    Blood Transfusions Not Asked    Caffeine Concern Yes     Comment: Coffee, more than 6 cups daily     Occupational Exposure Not Asked    Hobby Hazards Not Asked    Sleep Concern Not Asked    Stress Concern Not Asked    Weight Concern Not Asked    Special Diet Not Asked    Back Care Not Asked    Exercise Not Asked    Bike Helmet Not Asked    Seat Belt Not Asked    Self-Exams Not Asked   Social History Narrative    Not on file     Social Drivers of Health     Financial Resource Strain: Low Risk  (3/6/2025)    Financial Resource Strain     Within the past 12 months, have you or your family members you live with been unable to get utilities (heat, electricity) when it was really needed?: No   Food Insecurity: Low Risk  (3/6/2025)    Food Insecurity     Within the past 12 months, did you worry that your food would run out before you got money to buy more?: No     Within the past 12 months, did the food you bought just not last and you didn t have money to get more?: No   Transportation Needs: Low Risk  (3/6/2025)    Transportation Needs     Within the past 12 months, has lack of transportation kept you from medical appointments, getting your medicines, non-medical meetings or appointments, work, or from getting things that you need?: No   Physical Activity: Not on file   Stress: Not on file   Social Connections: Not on file   Interpersonal Safety: Low Risk  (11/9/2023)    Interpersonal Safety     Do you feel physically and emotionally safe where you currently live?: Yes     Within the past 12 months, have you been hit, slapped, kicked  or otherwise physically hurt by someone?: No     Within the past 12 months, have you been humiliated or emotionally abused in other ways by your partner or ex-partner?: No   Housing Stability: Low Risk  (3/6/2025)    Housing Stability     Do you have housing? : Yes     Are you worried about losing your housing?: No       Family History   Non-contributory     Review of Systems   CONSTITUTIONAL: No fever, chills, sweats, fatigue   EYES: no visual blurring, no double vision or visual loss  ENT: no decrease in hearing, no tinnitus, no vertigo, no hoarseness  RESPIRATORY: no shortness of breath, no cough, no sputum   CARDIOVASCULAR: no palpitations, no chest  pain, no exertional chest pain or pressure  GASTROINTESTINAL: no nausea or vomiting, or abd pain  GENITOURINARY: no dysuria, no frequency or hesitancy, no hematuria  MUSCULOSKELETAL:Pain in right hip, decrease range of motion  SKIN: no rashes, ecchymoses, abrasions or lacerations  NEUROLOGIC: no numbness or tingling of hands, no numbness or tingling  of feet, no syncope, no tremors or weakness  PSYCHIATRIC: no sleep disturbances, no anxiety or depression    Physical Exam   Temp: 99.4  F (37.4  C) Temp src: Tympanic BP: (!) 157/81 Pulse: 102   Resp: 20 SpO2: 93 % O2 Device: None (Room air)    Vital Signs with Ranges  Temp:  [97.5  F (36.4  C)-99.4  F (37.4  C)] 99.4  F (37.4  C)  Pulse:  [] 102  Resp:  [18-20] 20  BP: (122-157)/(57-81) 157/81  SpO2:  [93 %-96 %] 93 % 182 lbs 12.18 oz    # Pain Assessment:      3/6/2025     8:07 AM   Current Pain Score   Patient currently in pain? denies         Primary Survey:  Airway: patient talking  Breathing: symmetric respiratory effort bilaterally  Circulation: central pulses present and peripheral pulses present  Disability: Pupils - left 4 mm and brisk, right 4 mm and brisk     Lili Coma Scale - Total 15/15  Eye Response (E): 4  4= spontaneous,  3= to verbal/voice, 2=  to pain, 1= No response   Verbal Response (V):  5   5= Orientated, converses,  4= Confused, converses, 3= Inappropriate words,  2= Incomprehensible sounds,  1=No response   Motor Response (M): 6   6= Obeys commands, 5= Localizes to pain, 4= Withdrawal to pain, 3=Fexion to pain, 2= Extension to pain, 1= No response    Secondary Survey:  General: alert, oriented to person, place, time  Head: atraumatic, normocephalic, trachea midline  Eyes: PERRLA, pupils 3 mm, EOMI, corneas and conjunctivae clear  Ears: external ear canals  Nose: nares patent, no drainage, nasal septum non-tender    Neck:  . No midline posterior tenderness, full AROM without pain or tenderness   Chest/Pulmonary: normal respiratory rate and rhythm,   no chest wall tenderness or deformities,   Cardiovascular:regular rate and rhythm,   Abdomen: soft, non-tender, no guarding, no rebound tenderness and no tenderness to palpation, midline scar with hernia at superior aspect\.   : normal external genitalia, pelvis stable to lateral compression, though tender /urine yellow and clear  Back/Spine: no deformity, no midline tenderness, no sacral tenderness,  no step-offs and no abrasions or contusions  Musculoskel/Extremities: normal extremities, full AROM of major joints without tenderness, edema, erythema, ecchymosis, or abrasions. Unable to assess range of motion and strength of right leg due to pain.    Hand: no gross deformities of hands or fingers. Full AROM of hand and fingers in flexion and extension.  strength equal and symmetric.   Skin: no rashes, laceration, ecchymosis, skin warm and dry.   Neuro: PERRLA, alert, oriented x 4. CN II-XII grossly intact. No focal deficits. Strength 5/5 x 3 extremities.  Sensation intact.  Psychiatric: affect/mood normal, cooperative, normal judgement/insight and memory intact    Data   UA RESULTS:  Recent Labs   Lab Test 12/01/21  1404   COLOR Light Yellow   APPEARANCE Clear   URINEGLC Negative   URINEBILI Negative   URINEKETONE Negative   SG 1.011   UBLD  Trace*   URINEPH 5.5   PROTEIN Negative   NITRITE Negative   LEUKEST Negative   RBCU 2   WBCU 1          Studies:  XR Pelvis including Hip Right 2-3 Views   Final Result   IMPRESSION: Acute displaced fractures of the right superior and inferior pubic rami. No dislocation. Degenerative osteoarthritis of the hips, moderate on the left and mild on the right. Vascular calcifications.      CT Head w/o Contrast   Final Result   IMPRESSION:   1.  No CT finding of a mass, hemorrhage or focal area suggestive of acute infarct.   2.  Minimal age related changes.         XR Chest Port 1 View    (Results Pending)       Sam Smith MD

## 2025-03-06 NOTE — PLAN OF CARE
"[facility] Observation Note:  Patient is registered to observation and is in 3114/3114-1 at approximately 0223 via bed accompanied by transport tech from emergency room . Report received from Zeina SUNSHINE in SBAR format at 0204 via telephone. Patient transferred to bed via slideboard. Patient is alert and oriented X 3, reports pain; rates at 6 on 0-10 scale.  Patient oriented to room, unit, hourly rounding, and plan of care. Explained the admission packet including \"What is Observation Status\" and patient handbook with patient bill of rights brochure. Will continue to monitor and document as needed.  Nursing Observation criteria listed below was met:  Health care directives status obtained and documented: Yes  Care Everywhere authorization completed Yes    MRSA swab completed for patient 65 years and older: N/A    If initial lactic acid greater than 2.0, repeat lactic acid drawn within one hour of arrival to unit: NA. If no, state reason: n/a    Patient identifies a surrogate decision maker: Yes If yes, who:Wife Kaylee  Contact Information:see chart  Vaccination assessment and education completed: Yes   Vaccinations received prior to hospitalization: Pneumovax unknown  Influenza(seasonal)  unknown   Vaccination(s) ordered: patient declines    Skin issues/needs documented:Yes  Isolation Patient: no Education given and documented, correct sign in place and documentation row added to PCS:   n/a    Fall Prevention: Observation fall risk completed:  Yes Education given and documented, sticker and magnet in place: Yes    General Care Plan initiated with observation goal(s): Yes  Education (including assessment) Documented: Yes  New medication information given to patient and documented: Yes  Patient elects to use own medications from home during hospitalization:  No If yes, a MD order was obtained to use Medications from Home:   n/a  and home medications were sent to Pharmacy for verification for use during " hospitalization:  n/a  Patient has discharge needs (If yes, please explain):  unknown at this time

## 2025-03-06 NOTE — ED NOTES
Bed: ED11a  Expected date:   Expected time:   Means of arrival:   Comments:  Chis  79M Fall on ice, poss hip fx

## 2025-03-06 NOTE — PROGRESS NOTES
03/06/25 1140   Appointment Info   Signing Clinician's Name / Credentials (PT) MARTHA Fernando   Student Supervision Direct supervision provided;Direct Patient Contact Provided;Therapy services provided with the co-signing licensed therapist guiding and directing the services, and providing the skilled judgement and assessment throughout the session   Quick Adds   Quick Adds Certification   Living Environment   People in Home spouse   Current Living Arrangements house   Home Accessibility stairs to enter home   Number of Stairs, Main Entrance 3   Stair Railings, Main Entrance railing on right side (ascending)   Transportation Anticipated car, drives self   Living Environment Comments Lives with wife in one level home + basement. Wood stove in basement, but not primary form of heat and does not need to access the basement. 3 steps to enter with rail on R side ascending. Has tub/shower combo, no grab bars. Raised toilet no grab bar.   Self-Care   Usual Activity Tolerance moderate   Current Activity Tolerance fair   Equipment Currently Used at Home none   Fall history within last six months yes   Number of times patient has fallen within last six months 1   Activity/Exercise/Self-Care Comment Pt indep with self-cares at baseline. Notes he takes baths at baseline. Reports his wife is healthy and able to help out as needed. Does drive, but notes wife could drive if needed. Does not use an AD at baseline.   General Information   Onset of Illness/Injury or Date of Surgery 03/06/25   Referring Physician Janell Carrasquillo MD   Patient/Family Therapy Goals Statement (PT) Pt would like to return home   Pertinent History of Current Problem (include personal factors and/or comorbidities that impact the POC) Pt presented to the ED on 3/6/2025 due to slipping and falling on ice the evening of 3/5. He fell on his R hip and did hit his head. No significant findings on the head CT. Found to have an acute displaced fracture  of the R superior and inferior pubic rami.   Existing Precautions/Restrictions weight bearing  (WBAT Per Aretha Chavez NP)   Weight-Bearing Status - RLE weight-bearing as tolerated   Cognition   Affect/Mental Status (Cognition) WFL   Orientation Status (Cognition) oriented x 4;oriented to;person;place;situation;time   Follows Commands (Cognition) WFL   Safety Deficit (Cognition) awareness of need for assistance   Cognitive Status Comments Consistent reminders needed to use call button if pt needs to get up, as he was communicating as if he would get up on his own.   Pain Assessment   Patient Currently in Pain Yes, see Vital Sign flowsheet  (No pain at rest, but 8/10 pain with upright mobility.)   Integumentary/Edema   Integumentary/Edema no deficits were identifed   Posture    Posture Not impaired   Range of Motion (ROM)   Range of Motion ROM deficits secondary to pain   ROM Comment R hip ROM grossly decreased due to pain, but WFL for ambulating and STS transfers with FWW.   Strength (Manual Muscle Testing)   Strength (Manual Muscle Testing) Deficits observed during functional mobility   Strength Comments R hip strength   Bed Mobility   Bed Mobility supine-sit   Supine-Sit [place] (Bed Mobility) modified independence   Bed Mobility Limitations decreased ability to use legs for bridging/pushing   Impairments Contributing to Impaired Bed Mobility pain   Assistive Device (Bed Mobility) bed rails   Comment, (Bed Mobility) Pt able to safely transfer from supine to sitting edge of bed. Moves at a slightly slowed speed due to R hip pain.   Transfers   Transfers sit-stand transfer   Maintains Weight-bearing Status (Transfers) able to maintain   Impairments Contributing to Impaired Transfers pain   Sit-Stand Transfer   Sit-Stand [place] (Transfers) supervision   Assistive Device (Sit-Stand Transfers) walker, front-wheeled   Comment, (Sit-Stand Transfer) Pt naturally placed R LE slightly forward prior to  standing in order to reduce WBing discomfort. No outright balance or strength deficits with sit to/from stand transfers.   Gait/Stairs (Locomotion)   [place] Level (Gait) supervision   Assistive Device (Gait) walker, front-wheeled   Distance in Feet (Gait) 55   Pattern (Gait) other (see comments)  (partial step-through)   Deviations/Abnormal Patterns (Gait) gait speed decreased;antalgic;weight shifting decreased;stride length decreased   Maintains Weight-bearing Status (Gait) able to maintain   Comment, (Gait/Stairs) Pt safely ambulates with FWW to offload R LE during stance phase. Pain did not increase past 8/10 during ambulation. Pt ambulated additional 15' from room door to chair with FWW and SBA.   Balance   Balance other (describe)   Balance Comments No outright balance deficits. Balance naturally limited due reduced R LE WBing, but pt is stable when using the FWW.   Sensory Examination   Sensory Perception patient reports no sensory changes   Coordination   Coordination no deficits were identified   Muscle Tone   Muscle Tone no deficits were identified   Clinical Impression   Criteria for Skilled Therapeutic Intervention Yes, treatment indicated   PT Diagnosis (PT) Reduced functional mobility   Influenced by the following impairments Increased R hip/groin pain and decreased R LE WBing tolerance   Functional limitations due to impairments Decreased tolerance for ambulation and stairs   Clinical Presentation (PT Evaluation Complexity) evolving   Clinical Presentation Rationale Clinical reasoning   Clinical Decision Making (Complexity) low complexity   Planned Therapy Interventions (PT) gait training;balance training;neuromuscular re-education;strengthening;stair training;home program guidelines;transfer training   Risk & Benefits of therapy have been explained evaluation/treatment results reviewed;care plan/treatment goals reviewed;risks/benefits reviewed;current/potential barriers reviewed;participants  voiced agreement with care plan;participants included;patient   Clinical Impression Comments Physical therapy evaluation indicates pt has reduced functional mobility due to R hip/groin pain associated with R superior and inferior pubic rami fractures. He is currently WBAT on the R LE and does have difficulty bearing weight on that side. He demonstrates adequate UE and LE strength to support himself and offload the R LE during transfers and ambulation with a FWW. Stairs were not assessed today, in which pt needs to be able to negotiate 3 to get into his home. He feels his wife could provide assistance to get him into his house upon discharge. Continued skilled physical therapy is warranted in the acute setting to progress functional mobility and assess stairs as able. Recommend pt return home with assist and either outpatient or home care physical therapy.   PT Total Evaluation Time   PT Eval, Low Complexity Minutes (33241) 15   Therapy Certification   Start of care date 03/06/25   Certification date from 03/06/25   Certification date to 03/13/25   Medical Diagnosis Multiple closed fractures of pelvis with stable disruption of pelvic ring   Physical Therapy Goals   PT Frequency 6x/week   PT Predicted Duration/Target Date for Goal Attainment 03/13/25   PT Goals Transfers;Gait;Stairs   PT: Transfers Modified independent;Sit to/from stand;Bed to/from chair;Assistive device   PT: Gait Modified independent;Assistive device;Rolling walker;100 feet   PT: Stairs 3 stairs;Supervision/stand-by assist;Minimal assist;Rail on right;Assistive device   PT Discharge Planning   PT Plan Progress functional mobility and assess stairs   PT Discharge Recommendation (DC Rec) home with assist;home with home care physical therapy;home with outpatient physical therapy   PT Rationale for DC Rec Physical therapy evaluation indicates pt has reduced functional mobility due to R hip/groin pain associated with R superior and inferior pubic rami  fractures. He is currently WBAT on the R LE and does have difficulty bearing weight on that side. He demonstrates adequate UE and LE strength to support himself and offload the R LE during transfers and ambulation with a FWW. Stairs were not assessed today, in which pt needs to be able to negotiate 3 to get into his home. He feels his wife could provide assistance to get him into his house upon discharge. Continued skilled physical therapy is warranted in the acute setting to progress functional mobility and assess stairs as able. Recommend pt return home with assist and either outpatient or home care physical therapy.   PT Brief overview of current status SBA for STS transfers and ambulation   PT Total Distance Amb During Session (feet) 55   Physical Therapy Time and Intention   Total Session Time (sum of timed and untimed services) 15

## 2025-03-06 NOTE — ED TRIAGE NOTES
"Patient presents after he states he slipped on some ice and went down on his right side/back. Complaining of right rear hip pain. Patient states he did hit his head as well, but \"nothing serious\" No visible signs of redness, bruising, no blood thinners. Provider bedside doing trauma evaluation. No activation.         "

## 2025-03-06 NOTE — ED PROVIDER NOTES
History     Chief Complaint   Patient presents with    Hip Pain    Fall     The history is provided by the patient.   Trauma  Mechanism of injury: Fall  Injury location: head/neck and pelvis  Injury location detail: R buttock  Incident location: home     Fall:       Fall occurred: tripped       Impact surface: ice       Point of impact: head and buttocks    Current symptoms:       Associated symptoms:             Denies abdominal pain, back pain, chest pain, headache, nausea, neck pain and vomiting.         Allergies:  No Known Allergies    Problem List:    Patient Active Problem List    Diagnosis Date Noted    Multiple closed fractures of pelvis with stable disruption of pelvic ring, initial encounter (H) 03/06/2025     Priority: Medium    Adenomatous polyp of ascending colon 11/16/2023     Priority: Medium    Special screening for malignant neoplasms, colon 03/05/2021     Priority: Medium    Uncomplicated alcohol dependence (H) 03/05/2021     Priority: Medium    Tobacco use disorder 03/05/2021     Priority: Medium    High cholesterol 03/05/2021     Priority: Medium        Past Medical History:    Past Medical History:   Diagnosis Date    Alcohol Abuse 08/02/2011    Dyslipidemia 08/16/2011       Past Surgical History:    Past Surgical History:   Procedure Laterality Date    ANKLE SURGERY  2000    LT, hardware removal and ankle fusion; traumatic arthritis    ARTHROPLASTY KNEE Right 12/15/2021    Procedure: RIGHT TOTAL KNEE ARTHROPLASTY;  Surgeon: Cruz Vasquez MD;  Location: [facility] OR    COLONOSCOPY  2011    with polypectomy    COLONOSCOPY N/A 4/15/2021    Procedure: Colonoscopy, with  polypectomy;  Surgeon: Sam Smith MD;  Location: [facility]    COLONOSCOPY N/A 9/28/2023    Procedure: COLONOSCOPY with polypectomy and tatooing 120cm colon;  Surgeon: Porfirio Matthew MD;  Location: [facility] OR       Family History:    Family History   Problem Relation Age of Onset    Alzheimer Disease Mother     Colon Cancer  Father          age 70 colon cancer    Cancer - colorectal Father         (cause of death)        Social History:  Marital Status:   [2]  Social History     Tobacco Use    Smoking status: Former     Types: Cigarettes     Start date:     Smokeless tobacco: Never    Tobacco comments:     Has not smoked for last 2 weeks   Vaping Use    Vaping status: Never Used   Substance Use Topics    Alcohol use: Yes     Alcohol/week: 11.0 standard drinks of alcohol     Types: 11 Cans of beer per week     Comment: beer daily     Drug use: Never        Medications:    pantoprazole (PROTONIX) 40 MG EC tablet  potassium chloride ER (KLOR-CON M) 20 MEQ CR tablet          Review of Systems   Constitutional:  Negative for chills, diaphoresis and fever.   HENT:  Negative for voice change.    Eyes:  Negative for visual disturbance.   Respiratory:  Negative for cough, chest tightness, shortness of breath and wheezing.    Cardiovascular:  Negative for chest pain, palpitations and leg swelling.   Gastrointestinal:  Negative for abdominal distention, abdominal pain, anal bleeding, blood in stool, nausea and vomiting.   Genitourinary:  Negative for decreased urine volume, dysuria, flank pain and frequency.   Musculoskeletal:  Negative for back pain, gait problem, myalgias and neck pain.   Skin:  Negative for color change, pallor and rash.   Neurological:  Negative for dizziness, syncope, weakness, light-headedness, numbness and headaches.   Psychiatric/Behavioral:  Negative for confusion, sleep disturbance and suicidal ideas.        Physical Exam   BP: 131/70  Pulse: 83  Temp: 98  F (36.7  C)  Resp: 18  Weight: 85.1 kg (187 lb 9.8 oz)  SpO2: 95 %      Physical Exam  Vitals and nursing note reviewed.   Constitutional:       Appearance: He is well-developed.   HENT:      Head: Normocephalic and atraumatic.   Eyes:      Conjunctiva/sclera: Conjunctivae normal.      Pupils: Pupils are equal, round, and reactive to light.   Neck:       Thyroid: No thyromegaly.      Vascular: No JVD.      Trachea: No tracheal deviation.   Cardiovascular:      Rate and Rhythm: Normal rate and regular rhythm.      Heart sounds: Normal heart sounds. No murmur heard.     No gallop.   Pulmonary:      Effort: Pulmonary effort is normal. No respiratory distress.      Breath sounds: Normal breath sounds. No stridor. No wheezing or rales.   Chest:      Chest wall: No tenderness.   Abdominal:      General: Bowel sounds are normal. There is no distension.      Palpations: Abdomen is soft. There is no mass.      Tenderness: There is no abdominal tenderness. There is no guarding or rebound.   Musculoskeletal:      Cervical back: Normal range of motion and neck supple.      Right hip: Tenderness and bony tenderness present. No deformity or lacerations. Decreased range of motion.      Left hip: Normal. Normal strength.        Legs:    Lymphadenopathy:      Cervical: No cervical adenopathy.   Skin:     General: Skin is warm.      Coloration: Skin is not pale.      Findings: No erythema or rash.   Neurological:      Mental Status: He is alert and oriented to person, place, and time.   Psychiatric:         Behavior: Behavior normal.         ED Course        Procedures                Results for orders placed or performed during the hospital encounter of 03/05/25 (from the past 24 hours)   CT Head w/o Contrast    Narrative    EXAM: CT HEAD W/O CONTRAST  LOCATION: [facility]  DATE: 3/6/2025    INDICATION: fall, head injury  COMPARISON: None.  TECHNIQUE: Routine CT Head without IV contrast. Multiplanar reformats. Dose reduction techniques were used.    FINDINGS:  INTRACRANIAL CONTENTS: No intracranial hemorrhage, extraaxial collection, or mass effect.  No CT evidence of acute infarct. There is scattered low-attenuation within the periventricular and subcortical white matter consistent with minimal small vessel   ischemic disease. The ventricular system, basal cisterns  and the cortical sulci are consistent with minimal volume loss for age.     VISUALIZED ORBITS/SINUSES/MASTOIDS: No intraorbital abnormality. No paranasal sinus mucosal disease. No middle ear or mastoid effusion.    BONES/SOFT TISSUES: Calvarium intact. Mild scalp swelling parietal region near vertex.      Impression    IMPRESSION:  1.  No CT finding of a mass, hemorrhage or focal area suggestive of acute infarct.  2.  Minimal age related changes.     XR Pelvis including Hip Right 2-3 Views    Narrative    EXAM: XR PELVIS AND HIP RIGHT 2 VIEWS  LOCATION: [facility]  DATE: 3/6/2025    INDICATION: fall, hip pain  COMPARISON: Abdomen radiographs 11/19/2023.      Impression    IMPRESSION: Acute displaced fractures of the right superior and inferior pubic rami. No dislocation. Degenerative osteoarthritis of the hips, moderate on the left and mild on the right. Vascular calcifications.   Comprehensive metabolic panel   Result Value Ref Range    Sodium 133 (L) 135 - 145 mmol/L    Potassium 3.5 3.4 - 5.3 mmol/L    Carbon Dioxide (CO2) 23 22 - 29 mmol/L    Anion Gap 11 7 - 15 mmol/L    Urea Nitrogen 9.7 8.0 - 23.0 mg/dL    Creatinine 0.75 0.67 - 1.17 mg/dL    GFR Estimate >90 >60 mL/min/1.73m2    Calcium 8.3 (L) 8.8 - 10.4 mg/dL    Chloride 99 98 - 107 mmol/L    Glucose 115 (H) 70 - 99 mg/dL    Alkaline Phosphatase 164 (H) 40 - 150 U/L    AST 69 (H) 0 - 45 U/L    ALT 57 0 - 70 U/L    Protein Total 6.6 6.4 - 8.3 g/dL    Albumin 3.5 3.5 - 5.2 g/dL    Bilirubin Total 0.5 <=1.2 mg/dL   CBC with Platelets & Differential    Narrative    The following orders were created for panel order CBC with Platelets & Differential.  Procedure                               Abnormality         Status                     ---------                               -----------         ------                     CBC with platelets and d...[[id number]]  Abnormal            Final result                 Please view results for these tests on  the individual orders.   ABO/Rh type and screen    Narrative    The following orders were created for panel order ABO/Rh type and screen.  Procedure                               Abnormality         Status                     ---------                               -----------         ------                     Adult Type and Screen[[id number]]                            Preliminary result           Please view results for these tests on the individual orders.   CBC with platelets and differential   Result Value Ref Range    WBC Count 14.2 (H) 4.0 - 11.0 10e3/uL    RBC Count 4.32 (L) 4.40 - 5.90 10e6/uL    Hemoglobin 14.3 13.3 - 17.7 g/dL    Hematocrit 41.1 40.0 - 53.0 %    MCV 95 78 - 100 fL    MCH 33.1 (H) 26.5 - 33.0 pg    MCHC 34.8 31.5 - 36.5 g/dL    RDW 12.0 10.0 - 15.0 %    Platelet Count 207 150 - 450 10e3/uL    % Neutrophils 78 %    % Lymphocytes 13 %    % Monocytes 7 %    % Eosinophils 1 %    % Basophils 0 %    % Immature Granulocytes 1 %    NRBCs per 100 WBC 0 <1 /100    Absolute Neutrophils 11.1 (H) 1.6 - 8.3 10e3/uL    Absolute Lymphocytes 1.9 0.8 - 5.3 10e3/uL    Absolute Monocytes 1.0 0.0 - 1.3 10e3/uL    Absolute Eosinophils 0.1 0.0 - 0.7 10e3/uL    Absolute Basophils 0.1 0.0 - 0.2 10e3/uL    Absolute Immature Granulocytes 0.1 <=0.4 10e3/uL    Absolute NRBCs 0.0 10e3/uL   Adult Type and Screen   Result Value Ref Range    ABO/RH(D) O POS     Antibody Screen Negative Negative    SPECIMEN EXPIRATION DATE [id number]    Extra Tube    Narrative    The following orders were created for panel order Extra Tube.  Procedure                               Abnormality         Status                     ---------                               -----------         ------                     Extra Blue Top Tube[[id number]]                              Final result               Extra Red Top Tube[[id number]]                               Final result               Extra Heparinized Syringe[[id number]]                         Final result                 Please view results for these tests on the individual orders.   Extra Blue Top Tube   Result Value Ref Range    Hold Specimen JIC    Extra Red Top Tube   Result Value Ref Range    Hold Specimen JIC    Extra Heparinized Syringe   Result Value Ref Range    Hold Specimen JIC        Medications - No data to display    Assessments & Plan (with Medical Decision Making)   Mechanical fall on Ice    Right hip pain  CT HEAD: NEGATIVE  Xray hip: Acute displaced fractures of the right superior and inferior pubic rami  I spoke to Dr Willett, he recommended non surgical management and follow-up with Ortho tomorrow for recommendation of rehab and ambulation    Pt refused pain killers  I spoke to Dr Carrasquillo, accepted for admission     Labs reviewed    I have reviewed the nursing notes.    I have reviewed the findings, diagnosis, plan and need for follow up with the patient.        New Prescriptions    No medications on file       Final diagnoses:   Multiple closed fractures of pelvis with stable disruption of pelvic ring, initial encounter (H)       3/5/2025   [facility]       Daniel Fowler MD  03/06/25 0219

## 2025-03-06 NOTE — PROGRESS NOTES
03/06/25 1000   Appointment Info   Signing Clinician's Name / Credentials (OT) Siomara Avalos, OTR/L   Quick Adds   Quick Adds Certification   Living Environment   People in Home spouse   Current Living Arrangements house   Home Accessibility stairs to enter home   Number of Stairs, Main Entrance 2   Transportation Anticipated car, drives self   Living Environment Comments Patient reported that he lives with his wife in a single level home with 2 WES and a basement.   Self-Care   Usual Activity Tolerance moderate   Current Activity Tolerance poor   Equipment Currently Used at Home none   Fall history within last six months yes   Number of times patient has fallen within last six months 1   Activity/Exercise/Self-Care Comment Patient reported that he was independent with ADLs at baseline. He was ambulating without an assistive device. Patient reported that he is his wifes caregiver.   Instrumental Activities of Daily Living (IADL)   Previous Responsibilities meal prep;housekeeping;laundry;medication management;driving   IADL Comments Patient reported that he was independent with IADLs at baseline.   General Information   Onset of Illness/Injury or Date of Surgery 03/05/25   Referring Physician Janell Carrasquillo   Patient/Family Therapy Goal Statement (OT) Patient seemed open to STR.   Additional Occupational Profile Info/Pertinent History of Current Problem Patient presented to the hospital after a fall when walking his dogs last night resulting in multiple closed fractures of pelvis with stable disruption of pelvic ring.   Left Lower Extremity (Weight-bearing Status) weight-bearing as tolerated (WBAT)   Right Lower Extremity (Weight-bearing Status) weight-bearing as tolerated (WBAT)   General Observations and Info Patient reported pain in right pelvic region and thigh.   Cognitive Status Examination   Orientation Status orientation to person, place and time   Safety Deficit impulsivity;insight into  deficits/self-awareness   Pain Assessment   Patient Currently in Pain Yes, see Vital Sign flowsheet   Bed Mobility   Bed Mobility sit-supine   Sit-Supine [place] (Bed Mobility) supervision   Assistive Device (Bed Mobility) bed rails   Comment (Bed Mobility) Patient was able to transfer from EOB to supine with supervision.   Transfers   Transfers sit-stand transfer;toilet transfer   Sit-Stand Transfer   Sit-Stand [place] (Transfers) contact guard   Assistive Device (Sit-Stand Transfers) walker, front-wheeled   Sit/Stand Transfer Comments Patient was able to stand from bed with CGA, fww, and cues for safety.   Toilet Transfer   Type (Toilet Transfer) stand pivot/stand step   [place] Level (Toilet Transfer) contact guard;2 person assist   Assistive Device (Toilet Transfer) walker, front-wheeled   Toilet Transfer Comments Patient transferred to commode with CGA and assist of 2 for safety. He required max A for clothing management.   Activities of Daily Living   BADL Assessment/Intervention lower body dressing;toileting   Lower Body Dressing Assessment/Training   Comment, (Lower Body Dressing) Patient required max A for lower body dressing.   [place] Level (Lower Body Dressing) doff;don;socks;pants/bottoms;maximum assist (25% patient effort)   Toileting   Assistive Devices (Toileting) commode chair   Comment, (Toileting) Patient required assist x 2 to transfer to/from commode with one person providing CGA and the other assisting with clothing management.   [place] Level (Toileting) maximum assist (25% patient effort)   Clinical Impression   Criteria for Skilled Therapeutic Interventions Met (OT) Yes, treatment indicated   OT Diagnosis Impaired ADLs, strength, and activity tolerance   Influenced by the following impairments Multiple closed fractures of pelvis with stable disruption of pelvic ring   OT Problem List-Impairments impacting ADL problems related to;activity tolerance  impaired;pain;strength   Assessment of Occupational Performance 1-3 Performance Deficits   Identified Performance Deficits Impaired ADLS, strength and activity tolerance   Planned Therapy Interventions (OT) ADL retraining;strengthening;ROM   Clinical Decision Making Complexity (OT) problem focused assessment/low complexity   Risk & Benefits of therapy have been explained evaluation/treatment results reviewed;care plan/treatment goals reviewed;risks/benefits reviewed;current/potential barriers reviewed;participants voiced agreement with care plan;participants included;patient   Clinical Impression Comments Patient was previously living with his wife and was independent with ADLs and IADLs. Patient currently requires assistance with toileting and lower body dressing and mobility is limited due to pain. Patient required fww and Ax2 today to perform stand pivot transfer from bed<>commode.   OT Total Evaluation Time   OT Eval, Low Complexity Minutes (45909) 14   Therapy Certification   Medical Diagnosis Multiple closed fractures of pelvis with stable disruption of pelvic ring, initial encounter   Start of Care Date 03/06/25   Certification date from 03/06/25   Certification date to 03/20/25   OT Goals   Therapy Frequency (OT) 5 times/week   OT Predicted Duration/Target Date for Goal Attainment 03/20/25   OT Goals Lower Body Dressing;Transfers;Toilet Transfer/Toileting   OT: Lower Body Dressing Minimal assist;using adaptive equipment   OT: Transfer Supervision/stand-by assist   OT: Toilet Transfer/Toileting Supervision/stand-by assist   OT Discharge Planning   OT Plan Progress ADLs, strength, and mobility   OT Discharge Recommendation (DC Rec) Transitional Care Facility   OT Rationale for DC Rec Patient was previously living with his wife and was independent with ADLs and IADLs. Patient currently requires assistance with toileting and lower body dressing and mobility is limited due to pain. Patient is not safe to return  home at this time. Recommend STR.   OT Brief overview of current status Max A for lower body dressing; A x 2 for toileting; SBA for bed mobility   Total Session Time   Total Session Time (sum of timed and untimed services) 14   I certify the need for these services furnished under this plan of treatment and while under my care. (Physician co-signature of this document indicates review and certification of the therapy plan).

## 2025-03-06 NOTE — PROGRESS NOTES
Care Transitions focused note:      Assessment completed with Graham.    LOC: alert     Dx: pelvic fracture  Chronic Disease Management: ETOH dependence    Lives with: spouse, 2 dogs and a cat  Living at:  [place] [facility]  Transportation: YES, patient drives, spouse does not    Primary PCP: Di Orr  Insurance:  [employer] Medicare      Support System:  family and friends  Homecare/PCA: no  /County Services:   n  : NO      How was the [facility] notification completed: n/a    Health Care Directive: no and does not want information  Guardian: no  POA: no    Pharmacy: [facility]  Meds management: manages own    Adequate Resources for needs (housing, utilities, food/med): YES  Household chores: shared  Work/community/social activity: YES, gets out as desired    ADLs: independent  Ambulation:independent  Falls: yesterday walking the dogs, fractured pelvis  Nutrition: no concerns  Sleep: sleeps well    Equipment used: none      Oxygen supplier: no      Does the supplier have valid oxygen orders: n/a    Mental health: denies  Substance abuse: ETOH  Exposure to violence/abuse: denies  Stressors: denies    Able to Return to Prior Living Arrangements: YES    Choice of Vendor: n/a    Barriers: none identified    [place]: low    Plan: home with friend transporting.    Di Graf CM

## 2025-03-07 ENCOUNTER — APPOINTMENT (OUTPATIENT)
Dept: PHYSICAL THERAPY | Facility: HOSPITAL | Age: 75
End: 2025-03-07
Payer: COMMERCIAL

## 2025-03-07 ENCOUNTER — APPOINTMENT (OUTPATIENT)
Dept: OCCUPATIONAL THERAPY | Facility: HOSPITAL | Age: 75
End: 2025-03-07
Payer: COMMERCIAL

## 2025-03-07 VITALS
HEIGHT: 68 IN | RESPIRATION RATE: 18 BRPM | OXYGEN SATURATION: 95 % | BODY MASS INDEX: 27.7 KG/M2 | WEIGHT: 182.76 LBS | HEART RATE: 95 BPM | DIASTOLIC BLOOD PRESSURE: 80 MMHG | TEMPERATURE: 98.8 F | SYSTOLIC BLOOD PRESSURE: 155 MMHG

## 2025-03-07 LAB
ANION GAP SERPL CALCULATED.3IONS-SCNC: 8 MMOL/L (ref 7–15)
BUN SERPL-MCNC: 8.2 MG/DL (ref 8–23)
CALCIUM SERPL-MCNC: 8.2 MG/DL (ref 8.8–10.4)
CHLORIDE SERPL-SCNC: 105 MMOL/L (ref 98–107)
CREAT SERPL-MCNC: 0.73 MG/DL (ref 0.67–1.17)
EGFRCR SERPLBLD CKD-EPI 2021: >90 ML/MIN/1.73M2
ERYTHROCYTE [DISTWIDTH] IN BLOOD BY AUTOMATED COUNT: 11.9 % (ref 10–15)
GLUCOSE SERPL-MCNC: 120 MG/DL (ref 70–99)
HCO3 SERPL-SCNC: 24 MMOL/L (ref 22–29)
HCT VFR BLD AUTO: 38.2 % (ref 40–53)
HGB BLD-MCNC: 13.5 G/DL (ref 13.3–17.7)
MCH RBC QN AUTO: 32.8 PG (ref 26.5–33)
MCHC RBC AUTO-ENTMCNC: 35.3 G/DL (ref 31.5–36.5)
MCV RBC AUTO: 93 FL (ref 78–100)
PLATELET # BLD AUTO: 168 10E3/UL (ref 150–450)
POTASSIUM SERPL-SCNC: 3.5 MMOL/L (ref 3.4–5.3)
RBC # BLD AUTO: 4.12 10E6/UL (ref 4.4–5.9)
SODIUM SERPL-SCNC: 137 MMOL/L (ref 135–145)
WBC # BLD AUTO: 6.6 10E3/UL (ref 4–11)

## 2025-03-07 PROCEDURE — 96376 TX/PRO/DX INJ SAME DRUG ADON: CPT

## 2025-03-07 PROCEDURE — 250N000013 HC RX MED GY IP 250 OP 250 PS 637: Performed by: NURSE PRACTITIONER

## 2025-03-07 PROCEDURE — 36415 COLL VENOUS BLD VENIPUNCTURE: CPT | Performed by: NURSE PRACTITIONER

## 2025-03-07 PROCEDURE — G0378 HOSPITAL OBSERVATION PER HR: HCPCS

## 2025-03-07 PROCEDURE — 97530 THERAPEUTIC ACTIVITIES: CPT | Mod: GP

## 2025-03-07 PROCEDURE — 250N000013 HC RX MED GY IP 250 OP 250 PS 637: Performed by: INTERNAL MEDICINE

## 2025-03-07 PROCEDURE — 250N000011 HC RX IP 250 OP 636: Performed by: NURSE PRACTITIONER

## 2025-03-07 PROCEDURE — 80048 BASIC METABOLIC PNL TOTAL CA: CPT | Performed by: NURSE PRACTITIONER

## 2025-03-07 PROCEDURE — 99239 HOSP IP/OBS DSCHRG MGMT >30: CPT | Performed by: NURSE PRACTITIONER

## 2025-03-07 PROCEDURE — 85018 HEMOGLOBIN: CPT | Performed by: NURSE PRACTITIONER

## 2025-03-07 PROCEDURE — 97110 THERAPEUTIC EXERCISES: CPT | Mod: GO

## 2025-03-07 RX ORDER — OXYCODONE HYDROCHLORIDE 5 MG/1
2.5-5 TABLET ORAL EVERY 4 HOURS PRN
Qty: 12 TABLET | Refills: 0 | Status: SHIPPED | OUTPATIENT
Start: 2025-03-07

## 2025-03-07 RX ADMIN — KETOROLAC TROMETHAMINE 15 MG: 15 INJECTION, SOLUTION INTRAMUSCULAR; INTRAVENOUS at 12:05

## 2025-03-07 RX ADMIN — KETOROLAC TROMETHAMINE 15 MG: 15 INJECTION, SOLUTION INTRAMUSCULAR; INTRAVENOUS at 00:28

## 2025-03-07 RX ADMIN — OXYCODONE HYDROCHLORIDE 2.5 MG: 5 TABLET ORAL at 09:33

## 2025-03-07 RX ADMIN — ACETAMINOPHEN 975 MG: 325 TABLET, FILM COATED ORAL at 09:32

## 2025-03-07 RX ADMIN — KETOROLAC TROMETHAMINE 15 MG: 15 INJECTION, SOLUTION INTRAMUSCULAR; INTRAVENOUS at 06:22

## 2025-03-07 ASSESSMENT — ACTIVITIES OF DAILY LIVING (ADL)
ADLS_ACUITY_SCORE: 37
ADLS_ACUITY_SCORE: 38
ADLS_ACUITY_SCORE: 37
ADLS_ACUITY_SCORE: 38
ADLS_ACUITY_SCORE: 37
ADLS_ACUITY_SCORE: 38
ADLS_ACUITY_SCORE: 38
ADLS_ACUITY_SCORE: 37
ADLS_ACUITY_SCORE: 37
ADLS_ACUITY_SCORE: 38
ADLS_ACUITY_SCORE: 38

## 2025-03-07 NOTE — PROGRESS NOTES
03/07/25 1151   Appointment Info   Signing Clinician's Name / Credentials (PT) MARTHA Fernando   Student Supervision Direct supervision provided;Direct Patient Contact Provided;Therapy services provided with the co-signing licensed therapist guiding and directing the services, and providing the skilled judgement and assessment throughout the session   Interventions   Interventions Quick Adds Therapeutic Activity   Therapeutic Activity   Therapeutic Activities: dynamic activities to improve functional performance Minutes (20765) 24   Symptoms Noted During/After Treatment Increased pain   Treatment Detail/Skilled Intervention Pt tranferred supine to sitting EOB indep. Completed 3 STS transfers up to FWW throughout the session with SBA for balance. Ambulated 70' with FWW and SBA. Rates R hip/groin pain 8/10 when ambulating. Ascended/descended 4 steps with CGA for balance and R rail ascending and L rail descending with SEC in opposite to hand to mimic set up to get into his house. Pt does have canes or crutches he can use at home. Again notes his friend and/or wife will be able to assist him getting into the house. Discussed how getting in/out of the tub may be difficult for a little while and pt may need to do sponge baths until his pain/function improve. Provided pt with a FWW for home, as he does not currently own one. Demonstrated how to fold the walker up if needed. Left pt seated in chair with call light in easy reach.   PT Discharge Planning   PT Plan Progress functional mobility as able   PT Discharge Recommendation (DC Rec) home with assist;home with home care physical therapy;home with outpatient physical therapy   PT Rationale for DC Rec Pt demonstrates ability to walk household distances with a FWW and negotiate enough steps to get into his home. He is overall stable and can support himself with the FWW to offload the R LE as needed during upright mobility. He is safe to return home with either home  care or outpatient PT.   PT Brief overview of current status SBA for STS transfers and ambulation. Indep with bed mobility. CGA for stair negotiation with Rail and SEC.   PT Total Distance Amb During Session (feet) 70   PT Equipment Needed at Discharge walker, rolling  (Front wheeled walker)   Physical Therapy Time and Intention   Timed Code Treatment Minutes 24   Total Session Time (sum of timed and untimed services) 24

## 2025-03-07 NOTE — DISCHARGE INSTRUCTIONS
You have a follow up appointment with Dr. Noriega at [facility] in [place] on 03/13/25 at 01:30 PM. Please arrive 15 minutes early for this appointment.

## 2025-03-07 NOTE — PROGRESS NOTES
CXR and UA without concerns, hbg remains stable. Working with PT, surgery will sign off, follow up with orthopedics as outpatient.     Sam Smith MD

## 2025-03-07 NOTE — DISCHARGE SUMMARY
[facility]    Discharge Summary  Hospitalist    Date of Admission:  3/5/2025  Date of Discharge:  3/7/2025  2:00 PM  Discharging Provider: Aretha Chavez NP  Date of Service (when I saw the patient): 3/7/25    Discharge Diagnoses     Active Problems:    Uncomplicated alcohol dependence (H)    Multiple closed fractures of pelvis with stable disruption of pelvic ring, initial encounter (H)      History of Present Illness   From admission:74yoM with GERD and HLD presented to the ED after a fall on the ice this evening. He slipped and landed on his right side. He was able to pull himself up and get back to the house. He has pain in the right hip. He denies other injuries     Hospital Course       Multiple closed fractures of pelvis with stable disruption of pelvic ring, initial encounter (H): Pain controlled with minimal use of low dose oxycodone and toradol. He is able to ambulate with use of a walker. Please see PT notes. [facility].       Uncomplicated alcohol dependence (H)    Aretha Chavez CNP      Pending Results     Unresulted Labs Ordered in the Past 30 Days of this Admission       No orders found from 2/3/2025 to 3/6/2025.            Code Status   Full Code       Primary Care Physician   Di Orr           Discharge Disposition   Discharged to home  Condition at discharge: Stable    Consultations This Hospital Stay   PHYSICAL THERAPY ADULT IP CONSULT  OCCUPATIONAL THERAPY ADULT IP CONSULT    Time Spent on this Encounter   IAretha NP, personally saw the patient today and spent greater than 30 minutes discharging this patient.    Discharge Orders      Reason for your hospital stay    Pelvic fracture     Follow-up and recommended labs and tests     [facility] with  [facility] in [facility] weeks for pelvic fracture.     Activity    Your activity upon discharge: activity as tolerated     Diet    Follow this diet upon  discharge: Current Diet:Orders Placed This Encounter      Regular Diet Adult     Discharge Medications   Current Discharge Medication List        START taking these medications    Details   oxyCODONE (ROXICODONE) 5 MG tablet Take 0.5-1 tablets (2.5-5 mg) by mouth every 4 hours as needed for moderate to severe pain (IF pain not managed with non-pharmacological and non-opioid interventions).  Qty: 12 tablet, Refills: 0    Associated Diagnoses: Closed nondisplaced fracture of pelvis, unspecified part of pelvis, initial encounter (H)           CONTINUE these medications which have NOT CHANGED    Details   pantoprazole (PROTONIX) 40 MG EC tablet Take 1 daily x4 weeks  Qty: 28 tablet, Refills: 0    Associated Diagnoses: Gastroesophageal reflux disease, unspecified whether esophagitis present      potassium chloride ER (KLOR-CON M) 20 MEQ CR tablet Take 2 tabs first dose, then once daily after that (total 8 days)  Qty: 9 tablet, Refills: 0    Associated Diagnoses: Hypokalemia           Allergies   No Known Allergies  Data   Most Recent 3 CBC's:  Recent Labs   Lab Test 03/07/25  0529 03/06/25  0519 03/06/25  0029   WBC 6.6 10.4 14.2*   HGB 13.5 13.2* 14.3   MCV 93 94 95    178 207      Most Recent 3 BMP's:  Recent Labs   Lab Test 03/07/25  0529 03/06/25  0519 03/06/25  0029    133* 133*   POTASSIUM 3.5 3.9 3.5   CHLORIDE 105 101 99   CO2 24 21* 23   BUN 8.2 8.4 9.7   CR 0.73 0.64* 0.75   ANIONGAP 8 11 11   TOBIAS 8.2* 7.8* 8.3*   * 131* 115*     Most Recent 2 LFT's:  Recent Labs   Lab Test 03/06/25  0029 11/09/23  1107   AST 69* 29   ALT 57 24   ALKPHOS 164* 92   BILITOTAL 0.5 1.1     Most Recent INR's and Anticoagulation Dosing History:  Anticoagulation Dose History          Latest Ref Rng & Units 11/9/2023   Recent Dosing and Labs   INR 0.85 - 1.15 1.02      Most Recent 3 Troponin's:No lab results found.  Most Recent Cholesterol Panel:  Recent Labs   Lab Test 07/20/23  0822   CHOL 210*   LDL 99   HDL 61    TRIG 251*     Most Recent 6 Bacteria Isolates From Any Culture (See EPIC Reports for Culture Details):No lab results found.  Most Recent TSH, T4 and A1c Labs:  Recent Labs   Lab Test 07/20/23  0822   TSH 1.58     Results for orders placed or performed during the hospital encounter of 03/05/25   CT Head w/o Contrast    Narrative    EXAM: CT HEAD W/O CONTRAST  LOCATION: [facility]  DATE: 3/6/2025    INDICATION: fall, head injury  COMPARISON: None.  TECHNIQUE: Routine CT Head without IV contrast. Multiplanar reformats. Dose reduction techniques were used.    FINDINGS:  INTRACRANIAL CONTENTS: No intracranial hemorrhage, extraaxial collection, or mass effect.  No CT evidence of acute infarct. There is scattered low-attenuation within the periventricular and subcortical white matter consistent with minimal small vessel   ischemic disease. The ventricular system, basal cisterns and the cortical sulci are consistent with minimal volume loss for age.     VISUALIZED ORBITS/SINUSES/MASTOIDS: No intraorbital abnormality. No paranasal sinus mucosal disease. No middle ear or mastoid effusion.    BONES/SOFT TISSUES: Calvarium intact. Mild scalp swelling parietal region near vertex.      Impression    IMPRESSION:  1.  No CT finding of a mass, hemorrhage or focal area suggestive of acute infarct.  2.  Minimal age related changes.     XR Pelvis including Hip Right 2-3 Views    Narrative    EXAM: XR PELVIS AND HIP RIGHT 2 VIEWS  LOCATION: [facility]  DATE: 3/6/2025    INDICATION: fall, hip pain  COMPARISON: Abdomen radiographs 11/19/2023.      Impression    IMPRESSION: Acute displaced fractures of the right superior and inferior pubic rami. No dislocation. Degenerative osteoarthritis of the hips, moderate on the left and mild on the right. Vascular calcifications.   XR Chest Port 1 View    Narrative    PROCEDURE:  XR CHEST PORT 1 VIEW    HISTORY:  trauma eval.     COMPARISON:  2023    FINDINGS:   The cardiac  silhouette is normal in size. The pulmonary vasculature is  normal.  The lungs are clear. No pleural effusion or pneumothorax.  There are old healed right rib fractures.      Impression    IMPRESSION:  No acute cardiopulmonary disease.      VERONICA WISE MD         SYSTEM ID:  [id number]

## 2025-03-07 NOTE — PROGRESS NOTES
03/07/25 1000   Appointment Info   Signing Clinician's Name / Credentials (OT) Shayy Nielsen, OTD, OTR/L   Quick Adds   Quick Adds Certification   Interventions   Interventions Quick Adds Therapeutic Procedures/Exercise   Therapeutic Procedures/Exercise   Therapeutic Procedure: strength, endurance, ROM, flexibillity minutes (76081) 9   Symptoms Noted During/After Treatment none   Treatment Detail/Skilled Intervention Pt was sitting in chair upon arrival and agreeable to treatment. Pt complete LB dressing SBA- donning and doffing right sock. Pt completed seated exercises: bicep curls, wrist flexion, wrist extension, wrist rotation, fist pumps, shoulder press forward/upward/across, and chair march: 10x each. He stated he tried using the bathroom recently, but nothing happened and he would try again after breakfast. Assisted pt with calling his wife. Left pt seated in chair with call button within reach and clip alarm attached.   OT Discharge Planning   OT Plan Progress ADLs, strength, and mobility   OT Discharge Recommendation (DC Rec) home with assist;Transitional Care Facility   OT Rationale for DC Rec Pt was able to complete LB dressing with SBA. He stated he just went up and down the stairs with a cane with PT. Pt has become more independent with ADLs. He is still having pain with ambulation, so home with assist or TCU would be appropriate.   OT Brief overview of current status SBA LB dressing   Total Session Time   Timed Code Treatment Minutes 9   Total Session Time (sum of timed and untimed services) 9   Psychosocial Support   Trust Relationship/Rapport care explained;choices provided;empathic listening provided

## 2025-03-07 NOTE — PLAN OF CARE
Goal Outcome Evaluation:      Plan of Care Reviewed With: patient    Overall Patient Progress: improvingOverall Patient Progress: improving    Patient discharged at 2:00 PM via wheel chair accompanied by spouse and staff. Prescriptions sent to patients preferred pharmacy. All belongings sent with patient.     Discharge instructions reviewed with patient. Listed belongings gathered and returned to patient.     Patient discharged to home.   Report called to n/a    Surgical Patient   Surgical Procedures during stay: n/a  Did patient receive discharge instruction on wound care and recognition of infection symptoms? N/A    MISC  Follow up appointment made:  Yes  Home medications returned to patient: N/A  Patient reports pain was well managed at discharge: Yes       Patient instructed not to drive while taking narcotics and until cleared by ortho

## 2025-03-07 NOTE — PLAN OF CARE
Physical Therapy Discharge Summary    Reason for therapy discharge:    Discharged to home.    Progress towards therapy goal(s). See goals on Care Plan in [facility] electronic health record for goal details.  Goals partially met.  Barriers to achieving goals:   discharge from facility.    Therapy recommendation(s):    Continued therapy is recommended.  Rationale/Recommendations:  Outpatient or home care physical therapy indicated to progress functional mobility as acute fractures heal and if pt feels a reduction in function/strength has occurred once discharged.    Goal Outcome Evaluation:

## 2025-03-07 NOTE — PLAN OF CARE
Goal Outcome Evaluation:      Plan of Care Reviewed With: patient    Overall Patient Progress: improving    Alert and oriented. Complains of pain when moving, scheduled toradol. Uses urinal at bedside. IV saline locked. Vitals and assessments as charted. Uses call light appropriately.     Face to face report given with opportunity to observe patient.    Report given to Shayy Cardenas RN   3/7/2025  7:17 AM

## 2025-04-19 ENCOUNTER — HOSPITAL ENCOUNTER (EMERGENCY)
Facility: HOSPITAL | Age: 75
Discharge: HOME | End: 2025-04-19
Attending: EMERGENCY MEDICINE
Payer: COMMERCIAL

## 2025-04-19 VITALS
OXYGEN SATURATION: 98 % | WEIGHT: 178.1 LBS | RESPIRATION RATE: 18 BRPM | DIASTOLIC BLOOD PRESSURE: 90 MMHG | BODY MASS INDEX: 27.08 KG/M2 | SYSTOLIC BLOOD PRESSURE: 171 MMHG | TEMPERATURE: 97.5 F | HEART RATE: 81 BPM

## 2025-04-19 DIAGNOSIS — K42.9 UMBILICAL HERNIA WITHOUT OBSTRUCTION AND WITHOUT GANGRENE: ICD-10-CM

## 2025-04-19 PROCEDURE — 99282 EMERGENCY DEPT VISIT SF MDM: CPT | Performed by: EMERGENCY MEDICINE

## 2025-04-19 PROCEDURE — 99283 EMERGENCY DEPT VISIT LOW MDM: CPT

## 2025-04-19 ASSESSMENT — COLUMBIA-SUICIDE SEVERITY RATING SCALE - C-SSRS
1. IN THE PAST MONTH, HAVE YOU WISHED YOU WERE DEAD OR WISHED YOU COULD GO TO SLEEP AND NOT WAKE UP?: NO
6. HAVE YOU EVER DONE ANYTHING, STARTED TO DO ANYTHING, OR PREPARED TO DO ANYTHING TO END YOUR LIFE?: NO
2. HAVE YOU ACTUALLY HAD ANY THOUGHTS OF KILLING YOURSELF IN THE PAST MONTH?: NO

## 2025-04-20 ASSESSMENT — ENCOUNTER SYMPTOMS
FEVER: 0
CHILLS: 0
SHORTNESS OF BREATH: 0

## 2025-04-20 NOTE — ED NOTES
Patient discharged at this time. AVS reviewed, return precautions and follow up care discussed, patient verbalized understanding. Ambulated out of ED independently.

## 2025-04-20 NOTE — ED TRIAGE NOTES
"\"I have a lump in my belly and it grew fast.  I noticed it about 1 hr ago.  If I lie down it disappears and my belly flattens out.  No pain, no nausea, no diarrhea.\"           "

## 2025-04-20 NOTE — ED PROVIDER NOTES
History     Chief Complaint   Patient presents with    Mass     HPI  Christopher Valentine is a 75 year old male who is here with mass above his umbilicus. No h/o similar in the past. No pain, n/v/d.    Allergies:  No Known Allergies    Problem List:    Patient Active Problem List    Diagnosis Date Noted    Multiple closed fractures of pelvis with stable disruption of pelvic ring, initial encounter (H) 2025     Priority: Medium    Pelvic fracture (H) 2025     Priority: Medium    Adenomatous polyp of ascending colon 2023     Priority: Medium    Special screening for malignant neoplasms, colon 2021     Priority: Medium    Uncomplicated alcohol dependence (H) 2021     Priority: Medium    Tobacco use disorder 2021     Priority: Medium    High cholesterol 2021     Priority: Medium        Past Medical History:    Past Medical History:   Diagnosis Date    Alcohol Abuse 2011    Dyslipidemia 2011       Past Surgical History:    Past Surgical History:   Procedure Laterality Date    ANKLE SURGERY      LT, hardware removal and ankle fusion; traumatic arthritis    ARTHROPLASTY KNEE Right 12/15/2021    Procedure: RIGHT TOTAL KNEE ARTHROPLASTY;  Surgeon: Cruz Vasquez MD;  Location: [facility]    COLONOSCOPY      with polypectomy    COLONOSCOPY N/A 4/15/2021    Procedure: Colonoscopy, with  polypectomy;  Surgeon: Sam Smith MD;  Location: [facility]    COLONOSCOPY N/A 2023    Procedure: COLONOSCOPY with polypectomy and tatooing 120cm colon;  Surgeon: Porfirio Matthew MD;  Location: [facility]       Family History:    Family History   Problem Relation Age of Onset    Alzheimer Disease Mother     Colon Cancer Father          age 70 colon cancer    Cancer - colorectal Father         (cause of death)        Social History:  Marital Status:   [2]  Social History     Tobacco Use    Smoking status: Former     Types: Cigarettes     Start date:      Smokeless tobacco: Never    Tobacco comments:     Has not smoked for last 2 weeks   Vaping Use    Vaping status: Never Used   Substance Use Topics    Alcohol use: Yes     Alcohol/week: 11.0 standard drinks of alcohol     Types: 11 Cans of beer per week     Comment: beer daily     Drug use: Never        Medications:    oxyCODONE (ROXICODONE) 5 MG tablet  pantoprazole (PROTONIX) 40 MG EC tablet  potassium chloride ER (KLOR-CON M) 20 MEQ CR tablet          Review of Systems   Constitutional:  Negative for chills and fever.   Respiratory:  Negative for shortness of breath.    Cardiovascular:  Negative for chest pain.   All other systems reviewed and are negative.      Physical Exam   BP: (!) 171/90  Pulse: 81  Temp: 97.5  F (36.4  C)  Resp: 18  Weight: 80.8 kg (178 lb 1.6 oz)  SpO2: 98 %      Physical Exam  Vitals and nursing note reviewed.   Constitutional:       General: He is not in acute distress.     Appearance: Normal appearance. He is not ill-appearing, toxic-appearing or diaphoretic.   HENT:      Head: Normocephalic and atraumatic.      Right Ear: External ear normal.      Left Ear: External ear normal.   Eyes:      General: No scleral icterus.     Conjunctiva/sclera: Conjunctivae normal.   Pulmonary:      Effort: Pulmonary effort is normal. No respiratory distress.   Abdominal:      Comments: Reducible hernia above umbilicus, benign exam   Skin:     General: Skin is warm and dry.      Capillary Refill: Capillary refill takes less than 2 seconds.   Neurological:      General: No focal deficit present.      Mental Status: He is alert and oriented to person, place, and time. Mental status is at baseline.   Psychiatric:         Mood and Affect: Mood normal.         Behavior: Behavior normal.         ED Course        Procedures             Critical Care time:               No results found for this or any previous visit (from the past 24 hours).    Medications - No data to display    Assessments & Plan (with Medical  Decision Making)     I have reviewed the nursing notes.    I have reviewed the findings, diagnosis, plan and need for follow up with the patient.          Medical Decision Making  The patient's presentation was of low complexity (an acute and uncomplicated illness or injury).    The patient's evaluation involved:  history and exam without other MDM data elements    The patient's management necessitated only low risk treatment.    74 yo m here w/ new ventral hernia. Painless, reducible, no indications for imaging at this time, given surgery referral.    Discharge Medication List as of 4/19/2025  8:56 PM          Final diagnoses:   Umbilical hernia without obstruction and without gangrene       4/19/2025   [facility]       Marcus Quispe MD  04/20/25 0582

## 2025-04-22 ENCOUNTER — PREP FOR PROCEDURE (OUTPATIENT)
Dept: SURGERY | Facility: OTHER | Age: 75
End: 2025-04-22

## 2025-04-22 ENCOUNTER — OFFICE VISIT (OUTPATIENT)
Dept: SURGERY | Facility: OTHER | Age: 75
End: 2025-04-22
Attending: SURGERY
Payer: COMMERCIAL

## 2025-04-22 VITALS
OXYGEN SATURATION: 97 % | DIASTOLIC BLOOD PRESSURE: 66 MMHG | RESPIRATION RATE: 18 BRPM | SYSTOLIC BLOOD PRESSURE: 138 MMHG | HEART RATE: 92 BPM

## 2025-04-22 DIAGNOSIS — K43.9 VENTRAL HERNIA: Primary | ICD-10-CM

## 2025-04-22 DIAGNOSIS — Z86.0100 HISTORY OF COLONIC POLYPS: ICD-10-CM

## 2025-04-22 DIAGNOSIS — Z90.49 S/P RIGHT COLECTOMY: Primary | ICD-10-CM

## 2025-04-22 DIAGNOSIS — Z12.11 COLON CANCER SCREENING: ICD-10-CM

## 2025-04-22 DIAGNOSIS — K42.9 UMBILICAL HERNIA WITHOUT OBSTRUCTION AND WITHOUT GANGRENE: Primary | ICD-10-CM

## 2025-04-22 DIAGNOSIS — Z90.49 HISTORY OF RIGHT HEMICOLECTOMY: ICD-10-CM

## 2025-04-22 PROCEDURE — G0463 HOSPITAL OUTPT CLINIC VISIT: HCPCS

## 2025-04-22 RX ORDER — BISACODYL 5 MG
TABLET, DELAYED RELEASE (ENTERIC COATED) ORAL
Qty: 4 TABLET | Refills: 0 | Status: SHIPPED | OUTPATIENT
Start: 2025-04-22

## 2025-04-22 ASSESSMENT — PAIN SCALES - GENERAL: PAINLEVEL_OUTOF10: NO PAIN (0)

## 2025-04-22 NOTE — PATIENT INSTRUCTIONS
Thank you for allowing Dr Porfirio Matthew and our surgical team to participate in your care. Please call our health unit coordinator at [phone] with scheduling questions or the nurse at [phone] with any other questions or concerns.      You have been scheduled for: COLONOSCOPY with Dr. Porfirio Matthew on THURSDAY MAY 8TH at [facility].     You will be notified the weekday before the procedure for your check in time, you can also call admitting at  after 5:00PM the weekday before your procedure.    You will need to have and adult  to bring you home.    Surgery Education nurse (PAT phone call) to call one week prior to procedure, If you do not hear from them you can call them at this number    THURSDAY MAY 1ST  Please have your medication list available for this phone call.    You will use GOLYTELY bowel prep.    Please see handout for additional instruction.    You WILL need a pre-operative appointment with your primary care provider within 30 days of your procedure.    You may call [phone] or [phone] with any questions.      #################################################################################################################    Thank you for allowing Dr Porfirio Matthew and our surgical team to participate in your care. Please call our health unit coordinator at [phone] with scheduling questions or the nurse at [phone] with any other questions or concerns.      You have been scheduled for: VENTRAL HERNIA REPAIR with Dr. Porfirio Matthew on MONDAY MAY 12TH at [facility].     You will be notified the weekday before the procedure for your check in time, you can also call admitting at  after 5:00PM the weekday before your procedure.    You will need to have and adult  to bring you home.    Surgery Education nurse (PAT phone call) to call one week prior to procedure, If you do not hear from them you can call them at this number    MONDAY MAY  5TH  Please have your medication list available for this phone call.      Please see handout for additional instruction.    You WILL need a pre-operative appointment with your primary care provider within 30 days of your procedure.    You may call [phone] or [phone] with any questions.

## 2025-04-22 NOTE — PROGRESS NOTES
CLINIC NOTE - CONSULT  2025    Patient: Christopher Valentine  Referring Physician: Marcus Quispe    Reason for Referral: Ventral Hernia    This is a 75 year old male who about a year and a half ago underwent a right hemicolectomy for multiple polyps in the cecum.  Sent to the emergency room with complaint of a hernia at the site of his midline incision.  Referred to my office for possible repair.  States that approximately a year ago he slipped and fell and felt that is when he disrupted 30 closure.  Has no obstructive complaints.  The hernia is nontender.  The hernia is reducible.  With a vental hernia.  No nausea.  No vomiting.     Past Medical History:  Past Medical History:   Diagnosis Date    Alcohol Abuse 2011    Dyslipidemia 2011       Past Surgical History:  Past Surgical History:   Procedure Laterality Date    ANKLE SURGERY      LT, hardware removal and ankle fusion; traumatic arthritis    ARTHROPLASTY KNEE Right 12/15/2021    Procedure: RIGHT TOTAL KNEE ARTHROPLASTY;  Surgeon: Cruz Vasquez MD;  Location: [facility]    COLONOSCOPY      with polypectomy    COLONOSCOPY N/A 4/15/2021    Procedure: Colonoscopy, with  polypectomy;  Surgeon: Sam Smith MD;  [facility]: [facility]    COLONOSCOPY N/A 2023    Procedure: COLONOSCOPY with polypectomy and tatooing 120cm colon;  Surgeon: Porfirio Matthew MD;  Location: [facility]       Family History History:  Family History   Problem Relation Age of Onset    Alzheimer Disease Mother     Colon Cancer Father          age 70 colon cancer    Cancer - colorectal Father         (cause of death)        History of Tobacco Use:  History   Smoking Status    Former    Types: Cigarettes   Smokeless Tobacco    Never       Current Medications:  Current Outpatient Medications   Medication Sig Dispense Refill    bisacodyl (DULCOLAX) 5 MG EC tablet Take 2 tablets at 3 pm the day before your procedure. If your procedure is before 11 am, take 2  additional tablets at 11 pm. If your procedure is after 11 am, take 2 additional tablets at 6 am. For additional instructions refer to your colonoscopy prep instructions. 4 tablet 0    polyethylene glycol (GOLYTELY) 236 g suspension The night before the exam at 6 pm drink an 8-ounce glass every 15 minutes until the jug is half empty. If you arrive before 11 AM: Drink the other half of the Golytely jug at 11 PM night before procedure. If you arrive after 11 AM: Drink the other half of the Golytely jug at 6 AM day of procedure. For additional instructions refer to your colonoscopy prep instructions. 4000 mL 0       Allergies:  No Known Allergies    ROS:  Pertinent items are noted in HPI.  All other systems are negative.    PHYSICAL EXAM:     Vital signs: /66 (BP Location: Left arm, Cuff Size: Adult Large)   Pulse 92   Resp 18   SpO2 97%    Weight: [unfilled]   BMI: There is no height or weight on file to calculate BMI.   General: Normal, healthy, cooperative, in no acute distress, alert   Skin: no jaundice   HEENT: PERRLA and EOMI   Neck: supple   Abdominal: Abdomen soft, non-tender. BS normal. No masses,  No organomegaly    Extremities: No cyanosis, clubbing or edema noted bilaterally in Upper and Lower Extremities   Neurological: without deficit      Hernia    Location: Periumbilical midline    Reducible: YES    Tender: NO    Erythematous: NO      ASSESSMENT: 75 year old male with a primary ventral hernia located at the periumbilical midline.  The hernia is not symptomatic.  The hernia is not incarcerated.  The hernia is not tender.    PLAN:  Discussed the options with the patient.  After discussion patients elects for surgical intervention and repair.  On review of his chart he has not had a colonoscopy since his resection.  Will go ahead and do a colonoscopy to rule out any other intra-colonic abnormalities.  As long as the colonoscopy is normal we will plan on doing a open primary ventral hernia repair.   The procedure was explained with its risk, benefits and alternatives.  Risks include but are not limited to recurrence, infections, damage to internal organs, need for additional procedures, reactions to anesthesia.  All of the patients questions were answered.  The patient consents to proceed with the plan above.  The procedure will be scheduled.    Pre-operative physical : YES

## 2025-05-01 ENCOUNTER — OFFICE VISIT (OUTPATIENT)
Dept: FAMILY MEDICINE | Facility: OTHER | Age: 75
End: 2025-05-01
Attending: FAMILY MEDICINE
Payer: COMMERCIAL

## 2025-05-01 VITALS
OXYGEN SATURATION: 97 % | DIASTOLIC BLOOD PRESSURE: 74 MMHG | BODY MASS INDEX: 27.37 KG/M2 | TEMPERATURE: 98.1 F | HEART RATE: 86 BPM | RESPIRATION RATE: 14 BRPM | WEIGHT: 180 LBS | SYSTOLIC BLOOD PRESSURE: 132 MMHG

## 2025-05-01 DIAGNOSIS — Z01.818 PREOP EXAMINATION: Primary | ICD-10-CM

## 2025-05-01 DIAGNOSIS — F10.20 UNCOMPLICATED ALCOHOL DEPENDENCE (H): ICD-10-CM

## 2025-05-01 PROBLEM — F17.200 TOBACCO USE DISORDER: Status: RESOLVED | Noted: 2021-03-05 | Resolved: 2025-05-01

## 2025-05-01 PROBLEM — Z12.11 SPECIAL SCREENING FOR MALIGNANT NEOPLASMS, COLON: Status: RESOLVED | Noted: 2021-03-05 | Resolved: 2025-05-01

## 2025-05-01 LAB
ANION GAP SERPL CALCULATED.3IONS-SCNC: 15 MMOL/L (ref 7–15)
ATRIAL RATE - MUSE: 81 BPM
BUN SERPL-MCNC: 6.8 MG/DL (ref 8–23)
CALCIUM SERPL-MCNC: 9.2 MG/DL (ref 8.8–10.4)
CHLORIDE SERPL-SCNC: 101 MMOL/L (ref 98–107)
CREAT SERPL-MCNC: 0.62 MG/DL (ref 0.67–1.17)
DIASTOLIC BLOOD PRESSURE - MUSE: NORMAL MMHG
EGFRCR SERPLBLD CKD-EPI 2021: >90 ML/MIN/1.73M2
GLUCOSE SERPL-MCNC: 91 MG/DL (ref 70–99)
HCO3 SERPL-SCNC: 20 MMOL/L (ref 22–29)
HGB BLD-MCNC: 15.1 G/DL (ref 13.3–17.7)
INTERPRETATION ECG - MUSE: NORMAL
P AXIS - MUSE: 66 DEGREES
POTASSIUM SERPL-SCNC: 4.3 MMOL/L (ref 3.4–5.3)
PR INTERVAL - MUSE: 168 MS
QRS DURATION - MUSE: 80 MS
QT - MUSE: 386 MS
QTC - MUSE: 448 MS
R AXIS - MUSE: 1 DEGREES
SODIUM SERPL-SCNC: 136 MMOL/L (ref 135–145)
SYSTOLIC BLOOD PRESSURE - MUSE: NORMAL MMHG
T AXIS - MUSE: 66 DEGREES
VENTRICULAR RATE- MUSE: 81 BPM

## 2025-05-01 PROCEDURE — 80048 BASIC METABOLIC PNL TOTAL CA: CPT | Mod: ZL | Performed by: FAMILY MEDICINE

## 2025-05-01 PROCEDURE — 85018 HEMOGLOBIN: CPT | Mod: ZL | Performed by: FAMILY MEDICINE

## 2025-05-01 PROCEDURE — 36415 COLL VENOUS BLD VENIPUNCTURE: CPT | Mod: ZL | Performed by: FAMILY MEDICINE

## 2025-05-01 PROCEDURE — G0463 HOSPITAL OUTPT CLINIC VISIT: HCPCS

## 2025-05-01 ASSESSMENT — PAIN SCALES - GENERAL: PAINLEVEL_OUTOF10: MILD PAIN (1)

## 2025-05-01 NOTE — PROGRESS NOTES
Preoperative Evaluation  [facility]  [address] [place] [address]  Phone: [phone]  Primary Provider: JOSELO Suarez  Pre-op Performing Provider: Raad Desai MD  May 1, 2025             5/1/2025   Surgical Information   What procedure is being done? Vertical Hernia Repair    Facility or Hospital where procedure/surgery [place] be performed[place] [facility]    Who is doing the procedure / surgery? Dr. Matthew    Date of surgery / procedure: 5/12/2025    Time of surgery / procedure: TBD    Where do you plan to recover after surgery? at home with family        Proxy-reported     Fax number for surgical facility: Note does not need to be faxed, will be available electronically in Epic.    Assessment & Plan     The proposed surgical procedure is considered INTERMEDIATE risk.    Preop examination  EKG was performed, personally viewed and shows sinus rhythm without any ischemic changes.  Labs are seen below.  He is currently optimized for his upcoming procedure.  He uses no medications but was reminded to avoid anti-inflammatories and fish oil prior to his procedure.  He will otherwise be n.p.o. after midnight.  He does have a history of alcohol use up to 6 to 8 cans of beer a day, discussed that he should try to decrease this significantly prior to his procedure.  No history of alcohol withdrawal.  - EKG 12-lead complete w/read - (Clinic Performed)  - Basic metabolic panel; Future  - Hemoglobin; Future  - Basic metabolic panel  - Hemoglobin    Uncomplicated alcohol dependence (H)  See above.            - No identified additional risk factors other than previously addressed    Recommendation  Approval given to proceed with proposed procedure, without further diagnostic evaluation.    Roxie White is a 75 year old, presenting for the following:  Pre-Op Exam          5/1/2025     9:39 AM   Additional Questions   Roomed by Humphrey Sorto   Accompanied by None         5/1/2025     9:39 AM    Patient Reported Additional Medications   Patient reports taking the following new medications None     HPI:    Patient has developed an umbilical/ventral hernia that has been reducible.  He was actually seen in the emergency department in April for this.  He believes that this may have worsened in March when he slipped and fell on some ice.  He has since been seen by general surgery who recommends hernia repair.  He is also due for repeat colonoscopy.  He is status post right hemicolectomy in 2023.  The plan is to have colonoscopy on May 8 followed by hernia repair on May 12.    He has had procedures in the past including knee replacement, ankle surgery and numerous colonoscopies.  He has tolerated these well without any bleeding or anesthesia concerns.  He did have some postop ileus after his hemicolectomy.    He quit smoking approximately 1 year ago.    He reports that he drinks up to 6-8 beers a day.  No history of alcohol withdrawal.        5/1/2025   Pre-Op Questionnaire   Have you ever had a heart attack or stroke? No    Have you ever had surgery on your heart or blood vessels, such as a stent placement, a coronary artery bypass, or surgery on an artery in your head, neck, heart, or legs? No    Do you have chest pain with activity? No    Do you have a history of heart failure? No    Do you currently have a cold, bronchitis or symptoms of other infection? No    Do you have a cough, shortness of breath, or wheezing? No    Do you or anyone in your family have previous history of blood clots? No    Do you or does anyone in your family have a serious bleeding problem such as prolonged bleeding following surgeries or cuts? No    Have you ever had problems with anemia or been told to take iron pills? No    Have you had any abnormal blood loss such as black, tarry or bloody stools? No    Have you ever had a blood transfusion? No    Are you willing to have a blood transfusion if it is medically needed before,  during, or after your surgery? Yes    Have you or any of your relatives ever had problems with anesthesia? No    Do you have sleep apnea, excessive snoring or daytime drowsiness? No    Do you have any artifical heart valves or other implanted medical devices like a pacemaker, defibrillator, or continuous glucose monitor? No    Do you have artificial joints? (!) YES    Are you allergic to latex? No        Proxy-reported     Advance Care Planning    Discussed advance care planning with patient; however, patient declined at this time.    Preoperative Review of    reviewed - no record of controlled substances prescribed.    Patient Active Problem List    Diagnosis Date Noted    Multiple closed fractures of pelvis with stable disruption of pelvic ring, initial encounter (H) 03/06/2025     Priority: Medium    Pelvic fracture (H) 03/06/2025     Priority: Medium    Adenomatous polyp of ascending colon 11/16/2023     Priority: Medium    Special screening for malignant neoplasms, colon 03/05/2021     Priority: Medium    Uncomplicated alcohol dependence (H) 03/05/2021     Priority: Medium    Tobacco use disorder 03/05/2021     Priority: Medium    High cholesterol 03/05/2021     Priority: Medium      Past Medical History:   Diagnosis Date    Alcohol Abuse 08/02/2011    Dyslipidemia 08/16/2011     Past Surgical History:   Procedure Laterality Date    ANKLE SURGERY  2000    LT, hardware removal and ankle fusion; traumatic arthritis    ARTHROPLASTY KNEE Right 12/15/2021    Procedure: RIGHT TOTAL KNEE ARTHROPLASTY;  Surgeon: Cruz Vasquez MD;  Location: [facility]    COLONOSCOPY  2011    with polypectomy    COLONOSCOPY N/A 4/15/2021    Procedure: Colonoscopy, with  polypectomy;  Surgeon: Sam Smith MD;  Location: [facility]    COLONOSCOPY N/A 9/28/2023    Procedure: COLONOSCOPY with polypectomy and tatooing 120cm colon;  Surgeon: Porfirio Matthew MD;  Location: [facility]     Current Outpatient Medications   Medication Sig  "Dispense Refill    bisacodyl (DULCOLAX) 5 MG EC tablet Take 2 tablets at 3 pm the day before your procedure. If your procedure is before 11 am, take 2 additional tablets at 11 pm. If your procedure is after 11 am, take 2 additional tablets at 6 am. For additional instructions refer to your colonoscopy prep instructions. (Patient not taking: Reported on 5/1/2025) 4 tablet 0    polyethylene glycol (GOLYTELY) 236 g suspension The night before the exam at 6 pm drink an 8-ounce glass every 15 minutes until the jug is half empty. If you arrive before 11 AM: Drink the other half of the Golytely jug at 11 PM night before procedure. If you arrive after 11 AM: Drink the other half of the Golytely jug at 6 AM day of procedure. For additional instructions refer to your colonoscopy prep instructions. (Patient not taking: Reported on 5/1/2025) 4000 mL 0       No Known Allergies     Social History     Tobacco Use    Smoking status: Former     Types: Cigarettes     Start date: 1967     Passive exposure: Past    Smokeless tobacco: Never    Tobacco comments:     Has not smoked for last 2 weeks   Substance Use Topics    Alcohol use: Yes     Alcohol/week: 11.0 standard drinks of alcohol     Types: 11 Cans of beer per week     Comment: beer daily        History   Drug Use Unknown           Objective    BP (!) 147/78 (BP Location: Left arm, Patient Position: Sitting, Cuff Size: Adult Regular)   Pulse 86   Temp 98.1  F (36.7  C) (Tympanic)   Resp 14   Wt 81.6 kg (180 lb)   SpO2 97%   BMI 27.37 kg/m     Estimated body mass index is 27.37 kg/m  as calculated from the following:    Height as of 3/6/25: 1.727 m (5' 8\").    Weight as of this encounter: 81.6 kg (180 lb).  Physical Exam  GENERAL: alert and no distress  EYES: Eyes grossly normal to inspection, PERRL and conjunctivae and sclerae normal  HENT: ear canals and TM's normal, nose and mouth without ulcers or lesions.  Upper and lower dentures are noted.  NECK: no adenopathy, no " asymmetry, masses, or scars  RESP: lungs clear to auscultation - no rales, rhonchi or wheezes  CV: regular rate and rhythm, normal S1 S2, no S3 or S4, no murmur, click or rub, no peripheral edema  ABDOMEN: Easily reducible umbilical hernias noted especially when he is sitting up.  Periumbilical incision from previous surgery is noted and has healed well.  No other masses.  No hepatosplenomegaly.  MS: no gross musculoskeletal defects noted, no edema  SKIN: no suspicious lesions or rashes  NEURO: Normal strength and tone, mentation intact and speech normal  PSYCH: mentation appears normal, affect normal/bright      Diagnostics  Recent Results (from the past 24 hours)   Basic metabolic panel    Collection Time: 05/01/25 10:12 AM   Result Value Ref Range    Sodium 136 135 - 145 mmol/L    Potassium 4.3 3.4 - 5.3 mmol/L    Chloride 101 98 - 107 mmol/L    Carbon Dioxide (CO2)      Anion Gap      Urea Nitrogen 6.8 (L) 8.0 - 23.0 mg/dL    Creatinine 0.62 (L) 0.67 - 1.17 mg/dL    GFR Estimate >90 >60 mL/min/1.73m2    Calcium 9.2 8.8 - 10.4 mg/dL    Glucose 91 70 - 99 mg/dL   Hemoglobin    Collection Time: 05/01/25 10:12 AM   Result Value Ref Range    Hemoglobin 15.1 13.3 - 17.7 g/dL   EKG 12-lead complete w/read - (Clinic Performed)    Collection Time: 05/01/25 10:24 AM   Result Value Ref Range    Systolic Blood Pressure  mmHg    Diastolic Blood Pressure  mmHg    Ventricular Rate 81 BPM    Atrial Rate 81 BPM    NV Interval 168 ms    QRS Duration 80 ms     ms    QTc 448 ms    P Axis 66 degrees    R AXIS 1 degrees    T Axis 66 degrees    Interpretation ECG       Sinus rhythm  Normal ECG  When compared with ECG of 09-Nov-2023 10:45,  No significant change was found        EKG: appears normal, NSR, normal axis, normal intervals, no acute ST/T changes c/w ischemia, no LVH by voltage criteria, unchanged from previous tracings    Revised Cardiac Risk Index (RCRI)  The patient has the following serious cardiovascular risks for  perioperative complications:   - No serious cardiac risks = 0 points     RCRI Interpretation: 0 points: Class I (very low risk - 0.4% complication rate)         Signed Electronically by: Raad Desai MD  A copy of this evaluation report is provided to the requesting physician.

## 2025-05-01 NOTE — H&P (VIEW-ONLY)
Preoperative Evaluation  [facility] [place]  [address] [place] [address]  Phone: [phone]  Primary Provider: JOSELO Suarez  Pre-op Performing Provider: Raad Desai MD  May 1, 2025             5/1/2025   Surgical Information   What procedure is being done? Vertical Hernia Repair    Facility or Hospital where procedure/surgery will be performed: [facility] [place]    Who is doing the procedure / surgery? Dr. Matthew    Date of surgery / procedure: 5/12/2025    Time of surgery / procedure: TBD    Where do you plan to recover after surgery? at home with family        Proxy-reported     Fax number for surgical facility: Note does not need to be faxed, will be available electronically in Epic.    Assessment & Plan     The proposed surgical procedure is considered INTERMEDIATE risk.    Preop examination  EKG was performed, personally viewed and shows sinus rhythm without any ischemic changes.  Labs are seen below.  He is currently optimized for his upcoming procedure.  He uses no medications but was reminded to avoid anti-inflammatories and fish oil prior to his procedure.  He will otherwise be n.p.o. after midnight.  He does have a history of alcohol use up to 6 to 8 cans of beer a day, discussed that he should try to decrease this significantly prior to his procedure.  No history of alcohol withdrawal.  - EKG 12-lead complete w/read - (Clinic Performed)  - Basic metabolic panel; Future  - Hemoglobin; Future  - Basic metabolic panel  - Hemoglobin    Uncomplicated alcohol dependence (H)  See above.            - No identified additional risk factors other than previously addressed    Recommendation  Approval given to proceed with proposed procedure, without further diagnostic evaluation.    Roxie White is a 75 year old, presenting for the following:  Pre-Op Exam          5/1/2025     9:39 AM   Additional Questions   Roomed by Humphrey Sorto   Accompanied by None         5/1/2025     9:39 AM    Patient Reported Additional Medications   Patient reports taking the following new medications None     HPI:    Patient has developed an umbilical/ventral hernia that has been reducible.  He was actually seen in the emergency department in April for this.  He believes that this may have worsened in March when he slipped and fell on some ice.  He has since been seen by general surgery who recommends hernia repair.  He is also due for repeat colonoscopy.  He is status post right hemicolectomy in 2023.  The plan is to have colonoscopy on May 8 followed by hernia repair on May 12.    He has had procedures in the past including knee replacement, ankle surgery and numerous colonoscopies.  He has tolerated these well without any bleeding or anesthesia concerns.  He did have some postop ileus after his hemicolectomy.    He quit smoking approximately 1 year ago.    He reports that he drinks up to 6-8 beers a day.  No history of alcohol withdrawal.        5/1/2025   Pre-Op Questionnaire   Have you ever had a heart attack or stroke? No    Have you ever had surgery on your heart or blood vessels, such as a stent placement, a coronary artery bypass, or surgery on an artery in your head, neck, heart, or legs? No    Do you have chest pain with activity? No    Do you have a history of heart failure? No    Do you currently have a cold, bronchitis or symptoms of other infection? No    Do you have a cough, shortness of breath, or wheezing? No    Do you or anyone in your family have previous history of blood clots? No    Do you or does anyone in your family have a serious bleeding problem such as prolonged bleeding following surgeries or cuts? No    Have you ever had problems with anemia or been told to take iron pills? No    Have you had any abnormal blood loss such as black, tarry or bloody stools? No    Have you ever had a blood transfusion? No    Are you willing to have a blood transfusion if it is medically needed before,  during, or after your surgery? Yes    Have you or any of your relatives ever had problems with anesthesia? No    Do you have sleep apnea, excessive snoring or daytime drowsiness? No    Do you have any artifical heart valves or other implanted medical devices like a pacemaker, defibrillator, or continuous glucose monitor? No    Do you have artificial joints? (!) YES    Are you allergic to latex? No        Proxy-reported     Advance Care Planning    Discussed advance care planning with patient; however, patient declined at this time.    Preoperative Review of    reviewed - no record of controlled substances prescribed.    Patient Active Problem List    Diagnosis Date Noted    Multiple closed fractures of pelvis with stable disruption of pelvic ring, initial encounter (H) 03/06/2025     Priority: Medium    Pelvic fracture (H) 03/06/2025     Priority: Medium    Adenomatous polyp of ascending colon 11/16/2023     Priority: Medium    Special screening for malignant neoplasms, colon 03/05/2021     Priority: Medium    Uncomplicated alcohol dependence (H) 03/05/2021     Priority: Medium    Tobacco use disorder 03/05/2021     Priority: Medium    High cholesterol 03/05/2021     Priority: Medium      Past Medical History:   Diagnosis Date    Alcohol Abuse 08/02/2011    Dyslipidemia 08/16/2011     Past Surgical History:   Procedure Laterality Date    ANKLE SURGERY  2000    LT, hardware removal and ankle fusion; traumatic arthritis    ARTHROPLASTY KNEE Right 12/15/2021    Procedure: RIGHT TOTAL KNEE ARTHROPLASTY;  Surgeon: Cruz Vasquez MD;  Location: [facility]    COLONOSCOPY  2011    with polypectomy    COLONOSCOPY N/A 4/15/2021    Procedure: Colonoscopy, with  polypectomy;  Surgeon: Sam Smith MD;  Location: [facility]    COLONOSCOPY N/A 9/28/2023    Procedure: COLONOSCOPY with polypectomy and tatooing 120cm colon;  Surgeon: Porfirio Matthew MD;  Location: [facility]     Current Outpatient Medications   Medication Sig  "Dispense Refill    bisacodyl (DULCOLAX) 5 MG EC tablet Take 2 tablets at 3 pm the day before your procedure. If your procedure is before 11 am, take 2 additional tablets at 11 pm. If your procedure is after 11 am, take 2 additional tablets at 6 am. For additional instructions refer to your colonoscopy prep instructions. (Patient not taking: Reported on 5/1/2025) 4 tablet 0    polyethylene glycol (GOLYTELY) 236 g suspension The night before the exam at 6 pm drink an 8-ounce glass every 15 minutes until the jug is half empty. If you arrive before 11 AM: Drink the other half of the Golytely jug at 11 PM night before procedure. If you arrive after 11 AM: Drink the other half of the Golytely jug at 6 AM day of procedure. For additional instructions refer to your colonoscopy prep instructions. (Patient not taking: Reported on 5/1/2025) 4000 mL 0       No Known Allergies     Social History     Tobacco Use    Smoking status: Former     Types: Cigarettes     Start date: 1967     Passive exposure: Past    Smokeless tobacco: Never    Tobacco comments:     Has not smoked for last 2 weeks   Substance Use Topics    Alcohol use: Yes     Alcohol/week: 11.0 standard drinks of alcohol     Types: 11 Cans of beer per week     Comment: beer daily        History   Drug Use Unknown           Objective    BP (!) 147/78 (BP Location: Left arm, Patient Position: Sitting, Cuff Size: Adult Regular)   Pulse 86   Temp 98.1  F (36.7  C) (Tympanic)   Resp 14   Wt 81.6 kg (180 lb)   SpO2 97%   BMI 27.37 kg/m     Estimated body mass index is 27.37 kg/m  as calculated from the following:    Height as of 3/6/25: 1.727 m (5' 8\").    Weight as of this encounter: 81.6 kg (180 lb).  Physical Exam  GENERAL: alert and no distress  EYES: Eyes grossly normal to inspection, PERRL and conjunctivae and sclerae normal  HENT: ear canals and TM's normal, nose and mouth without ulcers or lesions.  Upper and lower dentures are noted.  NECK: no adenopathy, no " asymmetry, masses, or scars  RESP: lungs clear to auscultation - no rales, rhonchi or wheezes  CV: regular rate and rhythm, normal S1 S2, no S3 or S4, no murmur, click or rub, no peripheral edema  ABDOMEN: Easily reducible umbilical hernias noted especially when he is sitting up.  Periumbilical incision from previous surgery is noted and has healed well.  No other masses.  No hepatosplenomegaly.  MS: no gross musculoskeletal defects noted, no edema  SKIN: no suspicious lesions or rashes  NEURO: Normal strength and tone, mentation intact and speech normal  PSYCH: mentation appears normal, affect normal/bright      Diagnostics  Recent Results (from the past 24 hours)   Basic metabolic panel    Collection Time: 05/01/25 10:12 AM   Result Value Ref Range    Sodium 136 135 - 145 mmol/L    Potassium 4.3 3.4 - 5.3 mmol/L    Chloride 101 98 - 107 mmol/L    Carbon Dioxide (CO2)      Anion Gap      Urea Nitrogen 6.8 (L) 8.0 - 23.0 mg/dL    Creatinine 0.62 (L) 0.67 - 1.17 mg/dL    GFR Estimate >90 >60 mL/min/1.73m2    Calcium 9.2 8.8 - 10.4 mg/dL    Glucose 91 70 - 99 mg/dL   Hemoglobin    Collection Time: 05/01/25 10:12 AM   Result Value Ref Range    Hemoglobin 15.1 13.3 - 17.7 g/dL   EKG 12-lead complete w/read - (Clinic Performed)    Collection Time: 05/01/25 10:24 AM   Result Value Ref Range    Systolic Blood Pressure  mmHg    Diastolic Blood Pressure  mmHg    Ventricular Rate 81 BPM    Atrial Rate 81 BPM    KY Interval 168 ms    QRS Duration 80 ms     ms    QTc 448 ms    P Axis 66 degrees    R AXIS 1 degrees    T Axis 66 degrees    Interpretation ECG       Sinus rhythm  Normal ECG  When compared with ECG of 09-Nov-2023 10:45,  No significant change was found        EKG: appears normal, NSR, normal axis, normal intervals, no acute ST/T changes c/w ischemia, no LVH by voltage criteria, unchanged from previous tracings    Revised Cardiac Risk Index (RCRI)  The patient has the following serious cardiovascular risks for  perioperative complications:   - No serious cardiac risks = 0 points     RCRI Interpretation: 0 points: Class I (very low risk - 0.4% complication rate)         Signed Electronically by: Raad Desai MD  A copy of this evaluation report is provided to the requesting physician.

## 2025-05-02 ENCOUNTER — ANESTHESIA EVENT (OUTPATIENT)
Dept: SURGERY | Facility: HOSPITAL | Age: 75
End: 2025-05-02
Payer: COMMERCIAL

## 2025-05-02 ASSESSMENT — LIFESTYLE VARIABLES: TOBACCO_USE: 1

## 2025-05-02 NOTE — ANESTHESIA PREPROCEDURE EVALUATION
Anesthesia Pre-Procedure Evaluation    Patient: Christopher Valentine   MRN: [id number] : 1950        Procedure : Procedure(s):  COLONOSCOPY          Past Medical History:   Diagnosis Date    Alcohol Abuse 2011    Dyslipidemia 2011      Past Surgical History:   Procedure Laterality Date    ANKLE SURGERY  2000    LT, hardware removal and ankle fusion; traumatic arthritis    ARTHROPLASTY KNEE Right 12/15/2021    Procedure: RIGHT TOTAL KNEE ARTHROPLASTY;  Surgeon: Cruz Vasquez MD;  Location: [facility]    COLON SURGERY  2023    right hemicolectomy, Dr. Matthew    COLONOSCOPY  2011    with polypectomy    COLONOSCOPY N/A 04/15/2021    Procedure: Colonoscopy, with  polypectomy;  Surgeon: Sam Smith MD;  Location: [facility]    COLONOSCOPY N/A 2023    Procedure: COLONOSCOPY with polypectomy and tatooing 120cm colon;  Surgeon: Porfirio Matthew MD;  Location: [facility]      No Known Allergies   Social History     Tobacco Use    Smoking status: Former     Types: Cigarettes     Start date:      Passive exposure: Past    Smokeless tobacco: Never   Substance Use Topics    Alcohol use: Yes     Alcohol/week: 11.0 standard drinks of alcohol     Types: 11 Cans of beer per week     Comment: beer daily       Wt Readings from Last 1 Encounters:   25 81.6 kg (180 lb)        Anesthesia Evaluation   Pt has had prior anesthetic. Type: MAC and General.    No history of anesthetic complications       ROS/MED HX  ENT/Pulmonary: Comment: Chest CT 3/29/22 shows mild emphysema, coronary calcification. Never been on inhalers and has no complaints of dyspnea (during preop)    PFT's 3/16/21: Minimal airway obstruction suggesting small airway disease.     (+)     SABRINA risk factors,  hypertension,         tobacco use, Past use,                       Neurologic: Comment: Limited c-spine extension      Cardiovascular: Comment: BP Readings from Last 3 Encounters:  25 : 132/74  25 :  138/66  04/19/25 : (!) 171/90        (+) Dyslipidemia hypertension-range: no meds/ -   -  - -                                 Previous cardiac testing   Echo: Date: Results:    Stress Test:  Date: Results:    ECG Reviewed:  Date: 5/1/25 Results:  HR 81, sinus  Cath:  Date: Results:      METS/Exercise Tolerance: >4 METS Comment: Per patient chops wood   Hematologic:  - neg hematologic  ROS     Musculoskeletal: Comment: Right knee artificial joint  Left ankle fusion    Limited c-spine extension noted during a prior pre-op exam.     Admitted 3/5/25-3/7/25 after a fall: Multiple closed fractures of pelvis with stable disruption of pelvic ring, initial encounter  (+)  arthritis,             GI/Hepatic: Comment: Right joceline 11/2023    (+)        bowel prep,            Renal/Genitourinary:  - neg Renal ROS     Endo:  - neg endo ROS     Psychiatric/Substance Use: Comment: He does have a history of alcohol use up to 6 to 8 cans of beer a day. No history of alcohol withdrawal. (per 5/1/25 notes)    (+)   alcohol abuse (10-12 cans of beer/day)      Infectious Disease:  - neg infectious disease ROS     Malignancy:   (+) Malignancy, History of GI.GI CA  status post Surgery.      Other:  - neg other ROS          Physical Exam    Airway        Mallampati: I   TM distance: > 3 FB   Neck ROM: limited   Mouth opening: > 3 cm    Respiratory Devices and Support         Dental     Comment: Upper and lower dentures    (+) Removable bridges or other hardware      Cardiovascular   cardiovascular exam normal       Rhythm and rate: regular and normal     Pulmonary   pulmonary exam normal        breath sounds clear to auscultation           OUTSIDE LABS:  CBC:   Lab Results   Component Value Date    WBC 6.6 03/07/2025    WBC 10.4 03/06/2025    HGB 15.1 05/01/2025    HGB 13.5 03/07/2025    HCT 38.2 (L) 03/07/2025    HCT 37.6 (L) 03/06/2025     03/07/2025     03/06/2025     BMP:   Lab Results   Component Value Date      "05/01/2025     03/07/2025    POTASSIUM 4.3 05/01/2025    POTASSIUM 3.5 03/07/2025    CHLORIDE 101 05/01/2025    CHLORIDE 105 03/07/2025    CO2 20 (L) 05/01/2025    CO2 24 03/07/2025    BUN 6.8 (L) 05/01/2025    BUN 8.2 03/07/2025    CR 0.62 (L) 05/01/2025    CR 0.73 03/07/2025    GLC 91 05/01/2025     (H) 03/07/2025     COAGS:   Lab Results   Component Value Date    INR 1.02 11/09/2023     POC: No results found for: \"BGM\", \"HCG\", \"HCGS\"  HEPATIC:   Lab Results   Component Value Date    ALBUMIN 3.5 03/06/2025    PROTTOTAL 6.6 03/06/2025    ALT 57 03/06/2025    AST 69 (H) 03/06/2025     (H) 03/05/2021    ALKPHOS 164 (H) 03/06/2025    BILITOTAL 0.5 03/06/2025     OTHER:   Lab Results   Component Value Date    TOBIAS 9.2 05/01/2025    PHOS 2.2 (L) 11/22/2023    MAG 1.8 11/28/2023    TSH 1.58 07/20/2023    SED 9 12/01/2021       Anesthesia Plan    ASA Status:  3    NPO Status:  NPO Appropriate                  Consents    Anesthesia Plan(s) and associated risks, benefits, and realistic alternatives discussed. Questions answered and patient/representative(s) expressed understanding.     - Discussed:     - Discussed with:  Patient            Postoperative Care            Comments:    Other Comments: Reviewed 5/1 [facility] (done for his 5/8/25 colonoscopy and 5/12/25 hernia surgery)    Risks and benefits of MAC anesthetic discussed including dental damage, aspiration, loss of airway, conversion to general anesthetic, CV complications, MI, stroke, death. Pt wishes to proceed.            Angela Canela, APRN CNP    Clinically Significant Risk Factors Present on Admission                                          "

## 2025-05-06 ENCOUNTER — ANESTHESIA EVENT (OUTPATIENT)
Dept: SURGERY | Facility: HOSPITAL | Age: 75
End: 2025-05-06
Payer: COMMERCIAL

## 2025-05-06 ASSESSMENT — LIFESTYLE VARIABLES: TOBACCO_USE: 1

## 2025-05-07 NOTE — OR NURSING
"Call and spoke with patient to give him his arrival time.  Patient notes that he just finished a fish dinner with fried potatoes.  Patient noted that he has been eating today.  Patient reminded that he was suppose to be on clear liquids today.  Patient stated \"I thought I could eat until I started my prep\"  Dr. Matthew called and made aware; patient called back and given two options; to continue with procedure as scheduled or reschedule at a future date;  patient notes \"I am sure I will be all cleaned out, I would rather get this over with\"  patient verbalized understanding that if he did proceed tomorrow but was not cleaned out that he would have to re-prep and come back again; patient verbalized understanding.    "

## 2025-05-08 ENCOUNTER — HOSPITAL ENCOUNTER (OUTPATIENT)
Facility: HOSPITAL | Age: 75
Discharge: HOME | End: 2025-05-08
Attending: SURGERY | Admitting: SURGERY
Payer: COMMERCIAL

## 2025-05-08 ENCOUNTER — ANESTHESIA (OUTPATIENT)
Dept: SURGERY | Facility: HOSPITAL | Age: 75
End: 2025-05-08
Payer: COMMERCIAL

## 2025-05-08 VITALS
SYSTOLIC BLOOD PRESSURE: 140 MMHG | OXYGEN SATURATION: 97 % | DIASTOLIC BLOOD PRESSURE: 79 MMHG | HEIGHT: 69 IN | RESPIRATION RATE: 16 BRPM | WEIGHT: 180 LBS | TEMPERATURE: 97.2 F | HEART RATE: 74 BPM | BODY MASS INDEX: 26.66 KG/M2

## 2025-05-08 PROCEDURE — 250N000011 HC RX IP 250 OP 636: Performed by: NURSE ANESTHETIST, CERTIFIED REGISTERED

## 2025-05-08 PROCEDURE — 370N000017 HC ANESTHESIA TECHNICAL FEE, PER MIN: Performed by: SURGERY

## 2025-05-08 PROCEDURE — 88305 TISSUE EXAM BY PATHOLOGIST: CPT | Mod: TC | Performed by: SURGERY

## 2025-05-08 PROCEDURE — 360N000075 HC SURGERY LEVEL 2, PER MIN: Performed by: SURGERY

## 2025-05-08 PROCEDURE — 710N000012 HC RECOVERY PHASE 2, PER MINUTE: Performed by: SURGERY

## 2025-05-08 PROCEDURE — 999N000141 HC STATISTIC PRE-PROCEDURE NURSING ASSESSMENT: Performed by: SURGERY

## 2025-05-08 PROCEDURE — 250N000009 HC RX 250: Performed by: NURSE ANESTHETIST, CERTIFIED REGISTERED

## 2025-05-08 PROCEDURE — 258N000003 HC RX IP 258 OP 636: Performed by: NURSE PRACTITIONER

## 2025-05-08 PROCEDURE — 272N000001 HC OR GENERAL SUPPLY STERILE: Performed by: SURGERY

## 2025-05-08 PROCEDURE — 45385 COLONOSCOPY W/LESION REMOVAL: CPT | Mod: PT | Performed by: SURGERY

## 2025-05-08 PROCEDURE — 88305 TISSUE EXAM BY PATHOLOGIST: CPT | Mod: 26 | Performed by: PATHOLOGY

## 2025-05-08 RX ORDER — PROPOFOL 10 MG/ML
INJECTION, EMULSION INTRAVENOUS PRN
Status: DISCONTINUED | OUTPATIENT
Start: 2025-05-08 | End: 2025-05-08

## 2025-05-08 RX ORDER — LIDOCAINE 40 MG/G
CREAM TOPICAL
Status: DISCONTINUED | OUTPATIENT
Start: 2025-05-08 | End: 2025-05-08 | Stop reason: HOSPADM

## 2025-05-08 RX ORDER — ONDANSETRON 2 MG/ML
4 INJECTION INTRAMUSCULAR; INTRAVENOUS EVERY 30 MIN PRN
Status: DISCONTINUED | OUTPATIENT
Start: 2025-05-08 | End: 2025-05-08 | Stop reason: HOSPADM

## 2025-05-08 RX ORDER — DEXAMETHASONE SODIUM PHOSPHATE 10 MG/ML
4 INJECTION, SOLUTION INTRAMUSCULAR; INTRAVENOUS
Status: DISCONTINUED | OUTPATIENT
Start: 2025-05-08 | End: 2025-05-08 | Stop reason: HOSPADM

## 2025-05-08 RX ORDER — ONDANSETRON 4 MG/1
4 TABLET, ORALLY DISINTEGRATING ORAL EVERY 30 MIN PRN
Status: DISCONTINUED | OUTPATIENT
Start: 2025-05-08 | End: 2025-05-08 | Stop reason: HOSPADM

## 2025-05-08 RX ORDER — LIDOCAINE HYDROCHLORIDE 20 MG/ML
INJECTION, SOLUTION INFILTRATION; PERINEURAL PRN
Status: DISCONTINUED | OUTPATIENT
Start: 2025-05-08 | End: 2025-05-08

## 2025-05-08 RX ORDER — OXYCODONE HYDROCHLORIDE 5 MG/1
5 TABLET ORAL
Refills: 0 | Status: CANCELLED | OUTPATIENT
Start: 2025-05-08

## 2025-05-08 RX ORDER — SODIUM CHLORIDE, SODIUM LACTATE, POTASSIUM CHLORIDE, CALCIUM CHLORIDE 600; 310; 30; 20 MG/100ML; MG/100ML; MG/100ML; MG/100ML
INJECTION, SOLUTION INTRAVENOUS CONTINUOUS
Status: DISCONTINUED | OUTPATIENT
Start: 2025-05-08 | End: 2025-05-08 | Stop reason: HOSPADM

## 2025-05-08 RX ORDER — NALOXONE HYDROCHLORIDE 0.4 MG/ML
0.1 INJECTION, SOLUTION INTRAMUSCULAR; INTRAVENOUS; SUBCUTANEOUS
Status: DISCONTINUED | OUTPATIENT
Start: 2025-05-08 | End: 2025-05-08 | Stop reason: HOSPADM

## 2025-05-08 RX ADMIN — PROPOFOL 50 MG: 10 INJECTION, EMULSION INTRAVENOUS at 11:24

## 2025-05-08 RX ADMIN — SODIUM CHLORIDE, SODIUM LACTATE, POTASSIUM CHLORIDE, AND CALCIUM CHLORIDE: .6; .31; .03; .02 INJECTION, SOLUTION INTRAVENOUS at 10:20

## 2025-05-08 RX ADMIN — LIDOCAINE HYDROCHLORIDE 100 MG: 20 INJECTION, SOLUTION INFILTRATION; PERINEURAL at 11:17

## 2025-05-08 RX ADMIN — PROPOFOL 50 MG: 10 INJECTION, EMULSION INTRAVENOUS at 11:22

## 2025-05-08 RX ADMIN — PROPOFOL 100 MG: 10 INJECTION, EMULSION INTRAVENOUS at 11:17

## 2025-05-08 RX ADMIN — PROPOFOL 50 MG: 10 INJECTION, EMULSION INTRAVENOUS at 11:26

## 2025-05-08 ASSESSMENT — ACTIVITIES OF DAILY LIVING (ADL)
ADLS_ACUITY_SCORE: 54

## 2025-05-08 NOTE — ANESTHESIA CARE TRANSFER NOTE
Patient: Christopher Valentine    Procedure: Procedure(s):  COLONOSCOPY       Diagnosis: S/P right colectomy [Z90.49]  Colon cancer screening [Z12.11]  Diagnosis Additional Information: No value filed.    Anesthesia Type:   MAC     Note:    Oropharynx: oropharynx clear of all foreign objects and spontaneously breathing  Level of Consciousness: drowsy  Oxygen Supplementation: room air    Independent Airway: airway patency satisfactory and stable  Dentition: dentition unchanged  Vital Signs Stable: post-procedure vital signs reviewed and stable  Report to RN Given: handoff report given  Patient transferred to: Phase II    Handoff Report: Identifed the Patient, Identified the Reponsible Provider, Reviewed the pertinent medical history, Discussed the surgical course, Reviewed Intra-OP anesthesia mangement and issues during anesthesia, Set expectations for post-procedure period and Allowed opportunity for questions and acknowledgement of understanding      Vitals:  Vitals Value Taken Time   BP     Temp     Pulse     Resp     SpO2         Electronically Signed By: JACINTA Gleason CRNA  May 8, 2025  11:34 AM

## 2025-05-08 NOTE — DISCHARGE INSTRUCTIONS
Thank you for allowing [facility] to care for you today.  If you have any questions and/or concerns please reach out to us Monday-Friday 0800-4:00 PM at [phone].  After hours please go to the Urgent Care and/or Emergency Room.  If you feel like it is an emergency call 911.

## 2025-05-08 NOTE — INTERVAL H&P NOTE
"I have reviewed the surgical (or preoperative) H&P that is linked to this encounter, and examined the patient. There are no significant changes    Clinical Conditions Present on Arrival:  Clinically Significant Risk Factors Present on Admission                           # Overweight: Estimated body mass index is 26.97 kg/m  as calculated from the following:    Height as of this encounter: 1.74 m (5' 8.5\").    Weight as of this encounter: 81.6 kg (180 lb).           "

## 2025-05-08 NOTE — OP NOTE
REPORT OF OPERATION  DATE OF PROCEDURE: 5/8/2025    PATIENT: Christopher Valentine    SURGERY PERFORMED:   Colonoscopy     with biopsy    with use of snare    PREOPERATIVE DIAGNOSIS: History of Colon Polyps and status post right hemicolectomy    POSTOPERATIVE DIAGNOSIS:    Same   Colon polyps, 100 cm   Diverticulosis was identified.   Hemorrhoids  were  identified.    SURGEON: Porfirio Matthew MD    ASSISTANTS: None    ANESTHESIA: Monitored Anesthesia Care    COMPLICATIONS: None apparent    TRANSFUSIONS: None     TISSUE TO PATHOLOGY: Polyps from 100 cm were sent to pathology for pathological diagnosis.    FINDINGS: Colon polyps, 100 cm.  Diverticulosis was identified.  Hemorrhoids  were  identified.    INDICATIONS: This is a 75 year old male in need of a colonoscopy for Screening colonoscopy and status post right hemicolectomy.  The patient will be taken to the endoscopy suite for that procedure.    DESCRIPTIONS OF PROCEDURE IN DETAIL: After consent was obtained the patient was taken to the endoscopy suite and placed in the left lateral decubitus position.  The patient was identified and the correct patient was confirmed.  Monitored Anesthesia Care was given.  A time out was performed verifying the correct patient and the correct procedure.  The entire operative team was in agreement.  All necessary equipment and supplies were in the room.    Rectal exam was performed and no lesions of the anal canal were noted.  The colonoscope was inserted into the anus and passed without difficulty to the cecum.  The cecum was identified by the ileocecal valve, the coalescence of the tinea and the appendiceal orifice.  Upon withdrawal all walls of the colon were visualized.  Just proximal to the anastomosis at approximately 100 cm a polyp was identified.  These were removed by snare.  Tattooing their of the location was not done.  Large colon masses and colitis were not seen.colon  Diverticulosis was seen.  Upon reaching the rectum  the scope was retroflexed and internal hemorrhoids  were  seen.  The scope was straightened back out and removed from the patient.  The patient was then taken to the recovery room in stable condition tolerating the procedure well.      Prep: fair    Withdrawal time was 8 minutes.    It is recommended that the patient have another colonoscopy in 2 years.

## 2025-05-08 NOTE — ANESTHESIA POSTPROCEDURE EVALUATION
Patient: Christopher Valentine    Procedure: Procedure(s):  COLONOSCOPY WITH POLYPECTOMY       Anesthesia Type:  MAC    Note:  Disposition: Outpatient   Postop Pain Control: Uneventful            Sign Out: Well controlled pain   PONV: No   Neuro/Psych: Uneventful            Sign Out: Acceptable/Baseline neuro status   Airway/Respiratory: Uneventful            Sign Out: Acceptable/Baseline resp. status   CV/Hemodynamics: Uneventful            Sign Out: Acceptable CV status; No obvious hypovolemia; No obvious fluid overload   Other NRE: NONE   DID A NON-ROUTINE EVENT OCCUR? No       Last vitals:  Vitals Value Taken Time   /79 05/08/25 12:00   Temp 97.2  F (36.2  C) 05/08/25 11:40   Pulse 74 05/08/25 12:00   Resp 16 05/08/25 12:00   SpO2 98 % 05/08/25 12:01   Vitals shown include unfiled device data.    Electronically Signed By: JACINTA Dejesus CRNA  May 8, 2025  1:22 PM

## 2025-05-12 ENCOUNTER — ANESTHESIA (OUTPATIENT)
Dept: SURGERY | Facility: HOSPITAL | Age: 75
End: 2025-05-12
Payer: COMMERCIAL

## 2025-05-12 ENCOUNTER — APPOINTMENT (OUTPATIENT)
Dept: ULTRASOUND IMAGING | Facility: HOSPITAL | Age: 75
End: 2025-05-12
Attending: NURSE ANESTHETIST, CERTIFIED REGISTERED
Payer: COMMERCIAL

## 2025-05-12 ENCOUNTER — HOSPITAL ENCOUNTER (OUTPATIENT)
Facility: HOSPITAL | Age: 75
Discharge: HOME | End: 2025-05-12
Attending: SURGERY | Admitting: SURGERY
Payer: COMMERCIAL

## 2025-05-12 ENCOUNTER — RESULTS FOLLOW-UP (OUTPATIENT)
Dept: SURGERY | Facility: CLINIC | Age: 75
End: 2025-05-12

## 2025-05-12 VITALS
TEMPERATURE: 97.5 F | RESPIRATION RATE: 20 BRPM | SYSTOLIC BLOOD PRESSURE: 169 MMHG | WEIGHT: 176.6 LBS | HEART RATE: 65 BPM | BODY MASS INDEX: 26.76 KG/M2 | HEIGHT: 68 IN | OXYGEN SATURATION: 95 % | DIASTOLIC BLOOD PRESSURE: 88 MMHG

## 2025-05-12 DIAGNOSIS — K43.9 VENTRAL HERNIA WITHOUT OBSTRUCTION OR GANGRENE: Primary | ICD-10-CM

## 2025-05-12 PROCEDURE — 250N000025 HC SEVOFLURANE, PER MIN: Performed by: SURGERY

## 2025-05-12 PROCEDURE — 272N000001 HC OR GENERAL SUPPLY STERILE: Performed by: SURGERY

## 2025-05-12 PROCEDURE — 250N000013 HC RX MED GY IP 250 OP 250 PS 637: Performed by: SURGERY

## 2025-05-12 PROCEDURE — 250N000009 HC RX 250: Performed by: NURSE ANESTHETIST, CERTIFIED REGISTERED

## 2025-05-12 PROCEDURE — 710N000012 HC RECOVERY PHASE 2, PER MINUTE: Performed by: SURGERY

## 2025-05-12 PROCEDURE — 250N000011 HC RX IP 250 OP 636: Performed by: SURGERY

## 2025-05-12 PROCEDURE — 710N000010 HC RECOVERY PHASE 1, LEVEL 2, PER MIN: Performed by: SURGERY

## 2025-05-12 PROCEDURE — 49593 RPR AA HRN 1ST 3-10 RDC: CPT | Performed by: SURGERY

## 2025-05-12 PROCEDURE — 250N000011 HC RX IP 250 OP 636: Mod: JZ | Performed by: NURSE PRACTITIONER

## 2025-05-12 PROCEDURE — 250N000011 HC RX IP 250 OP 636: Performed by: NURSE ANESTHETIST, CERTIFIED REGISTERED

## 2025-05-12 PROCEDURE — 999N000141 HC STATISTIC PRE-PROCEDURE NURSING ASSESSMENT: Performed by: SURGERY

## 2025-05-12 PROCEDURE — 360N000076 HC SURGERY LEVEL 3, PER MIN: Performed by: SURGERY

## 2025-05-12 PROCEDURE — 258N000003 HC RX IP 258 OP 636: Performed by: NURSE PRACTITIONER

## 2025-05-12 PROCEDURE — 370N000017 HC ANESTHESIA TECHNICAL FEE, PER MIN: Performed by: SURGERY

## 2025-05-12 RX ORDER — FENTANYL CITRATE 50 UG/ML
25 INJECTION, SOLUTION INTRAMUSCULAR; INTRAVENOUS EVERY 5 MIN PRN
Status: DISCONTINUED | OUTPATIENT
Start: 2025-05-12 | End: 2025-05-12 | Stop reason: HOSPADM

## 2025-05-12 RX ORDER — CEFAZOLIN SODIUM/WATER 2 G/20 ML
2 SYRINGE (ML) INTRAVENOUS SEE ADMIN INSTRUCTIONS
Status: DISCONTINUED | OUTPATIENT
Start: 2025-05-12 | End: 2025-05-12 | Stop reason: HOSPADM

## 2025-05-12 RX ORDER — ONDANSETRON 2 MG/ML
4 INJECTION INTRAMUSCULAR; INTRAVENOUS EVERY 30 MIN PRN
Status: DISCONTINUED | OUTPATIENT
Start: 2025-05-12 | End: 2025-05-12 | Stop reason: HOSPADM

## 2025-05-12 RX ORDER — HYDROMORPHONE HYDROCHLORIDE 1 MG/ML
0.4 INJECTION, SOLUTION INTRAMUSCULAR; INTRAVENOUS; SUBCUTANEOUS EVERY 5 MIN PRN
Status: DISCONTINUED | OUTPATIENT
Start: 2025-05-12 | End: 2025-05-12 | Stop reason: HOSPADM

## 2025-05-12 RX ORDER — ONDANSETRON 4 MG/1
4 TABLET, ORALLY DISINTEGRATING ORAL EVERY 30 MIN PRN
Status: DISCONTINUED | OUTPATIENT
Start: 2025-05-12 | End: 2025-05-12 | Stop reason: HOSPADM

## 2025-05-12 RX ORDER — HYDROCODONE BITARTRATE AND ACETAMINOPHEN 5; 325 MG/1; MG/1
1 TABLET ORAL EVERY 6 HOURS PRN
Qty: 18 TABLET | Refills: 0 | Status: SHIPPED | OUTPATIENT
Start: 2025-05-12 | End: 2025-05-15

## 2025-05-12 RX ORDER — LIDOCAINE HYDROCHLORIDE 20 MG/ML
INJECTION, SOLUTION INFILTRATION; PERINEURAL PRN
Status: DISCONTINUED | OUTPATIENT
Start: 2025-05-12 | End: 2025-05-12

## 2025-05-12 RX ORDER — FENTANYL CITRATE 50 UG/ML
50 INJECTION, SOLUTION INTRAMUSCULAR; INTRAVENOUS EVERY 5 MIN PRN
Status: DISCONTINUED | OUTPATIENT
Start: 2025-05-12 | End: 2025-05-12 | Stop reason: HOSPADM

## 2025-05-12 RX ORDER — BUPIVACAINE HYDROCHLORIDE 2.5 MG/ML
INJECTION, SOLUTION EPIDURAL; INFILTRATION; INTRACAUDAL; PERINEURAL
Status: COMPLETED | OUTPATIENT
Start: 2025-05-12 | End: 2025-05-12

## 2025-05-12 RX ORDER — CEFAZOLIN SODIUM/WATER 2 G/20 ML
2 SYRINGE (ML) INTRAVENOUS
Status: COMPLETED | OUTPATIENT
Start: 2025-05-12 | End: 2025-05-12

## 2025-05-12 RX ORDER — METOPROLOL TARTRATE 1 MG/ML
1 INJECTION, SOLUTION INTRAVENOUS EVERY 5 MIN PRN
Status: DISCONTINUED | OUTPATIENT
Start: 2025-05-12 | End: 2025-05-12 | Stop reason: HOSPADM

## 2025-05-12 RX ORDER — ACETAMINOPHEN 325 MG/1
975 TABLET ORAL ONCE
Status: COMPLETED | OUTPATIENT
Start: 2025-05-12 | End: 2025-05-12

## 2025-05-12 RX ORDER — DEXAMETHASONE SODIUM PHOSPHATE 4 MG/ML
4 INJECTION, SOLUTION INTRA-ARTICULAR; INTRALESIONAL; INTRAMUSCULAR; INTRAVENOUS; SOFT TISSUE
Status: DISCONTINUED | OUTPATIENT
Start: 2025-05-12 | End: 2025-05-12 | Stop reason: HOSPADM

## 2025-05-12 RX ORDER — ONDANSETRON 2 MG/ML
INJECTION INTRAMUSCULAR; INTRAVENOUS PRN
Status: DISCONTINUED | OUTPATIENT
Start: 2025-05-12 | End: 2025-05-12

## 2025-05-12 RX ORDER — LIDOCAINE 40 MG/G
CREAM TOPICAL
Status: DISCONTINUED | OUTPATIENT
Start: 2025-05-12 | End: 2025-05-12 | Stop reason: HOSPADM

## 2025-05-12 RX ORDER — HYDROXYZINE HYDROCHLORIDE 50 MG/ML
50 INJECTION, SOLUTION INTRAMUSCULAR
Status: DISCONTINUED | OUTPATIENT
Start: 2025-05-12 | End: 2025-05-12 | Stop reason: HOSPADM

## 2025-05-12 RX ORDER — NALOXONE HYDROCHLORIDE 0.4 MG/ML
0.1 INJECTION, SOLUTION INTRAMUSCULAR; INTRAVENOUS; SUBCUTANEOUS
Status: DISCONTINUED | OUTPATIENT
Start: 2025-05-12 | End: 2025-05-12 | Stop reason: HOSPADM

## 2025-05-12 RX ORDER — SODIUM CHLORIDE, SODIUM LACTATE, POTASSIUM CHLORIDE, CALCIUM CHLORIDE 600; 310; 30; 20 MG/100ML; MG/100ML; MG/100ML; MG/100ML
INJECTION, SOLUTION INTRAVENOUS CONTINUOUS
Status: DISCONTINUED | OUTPATIENT
Start: 2025-05-12 | End: 2025-05-12 | Stop reason: HOSPADM

## 2025-05-12 RX ORDER — DEXAMETHASONE SODIUM PHOSPHATE 10 MG/ML
INJECTION, SOLUTION INTRAMUSCULAR; INTRAVENOUS
Status: COMPLETED | OUTPATIENT
Start: 2025-05-12 | End: 2025-05-12

## 2025-05-12 RX ORDER — HYDROMORPHONE HYDROCHLORIDE 1 MG/ML
0.2 INJECTION, SOLUTION INTRAMUSCULAR; INTRAVENOUS; SUBCUTANEOUS EVERY 5 MIN PRN
Status: DISCONTINUED | OUTPATIENT
Start: 2025-05-12 | End: 2025-05-12 | Stop reason: HOSPADM

## 2025-05-12 RX ORDER — FENTANYL CITRATE 50 UG/ML
INJECTION, SOLUTION INTRAMUSCULAR; INTRAVENOUS PRN
Status: DISCONTINUED | OUTPATIENT
Start: 2025-05-12 | End: 2025-05-12

## 2025-05-12 RX ORDER — OXYCODONE HYDROCHLORIDE 5 MG/1
5 TABLET ORAL
Status: COMPLETED | OUTPATIENT
Start: 2025-05-12 | End: 2025-05-12

## 2025-05-12 RX ORDER — PROPOFOL 10 MG/ML
INJECTION, EMULSION INTRAVENOUS PRN
Status: DISCONTINUED | OUTPATIENT
Start: 2025-05-12 | End: 2025-05-12

## 2025-05-12 RX ADMIN — ACETAMINOPHEN 975 MG: 325 TABLET, FILM COATED ORAL at 11:52

## 2025-05-12 RX ADMIN — BUPIVACAINE 10 ML: 13.3 INJECTION, SUSPENSION, LIPOSOMAL INFILTRATION at 14:26

## 2025-05-12 RX ADMIN — LIDOCAINE HYDROCHLORIDE 40 MG: 20 INJECTION, SOLUTION INFILTRATION; PERINEURAL at 14:20

## 2025-05-12 RX ADMIN — OXYCODONE HYDROCHLORIDE 5 MG: 5 TABLET ORAL at 15:52

## 2025-05-12 RX ADMIN — ONDANSETRON 4 MG: 2 INJECTION INTRAMUSCULAR; INTRAVENOUS at 14:46

## 2025-05-12 RX ADMIN — BUPIVACAINE HYDROCHLORIDE 30 ML: 2.5 INJECTION, SOLUTION EPIDURAL; INFILTRATION; INTRACAUDAL at 14:26

## 2025-05-12 RX ADMIN — METOPROLOL TARTRATE 1 MG: 1 INJECTION, SOLUTION INTRAVENOUS at 15:43

## 2025-05-12 RX ADMIN — DEXAMETHASONE SODIUM PHOSPHATE 10 MG: 10 INJECTION, SOLUTION INTRAMUSCULAR; INTRAVENOUS at 14:26

## 2025-05-12 RX ADMIN — FENTANYL CITRATE 50 MCG: 50 INJECTION INTRAMUSCULAR; INTRAVENOUS at 14:19

## 2025-05-12 RX ADMIN — Medication 2 G: at 14:08

## 2025-05-12 RX ADMIN — ROCURONIUM BROMIDE 50 MG: 10 INJECTION INTRAVENOUS at 14:21

## 2025-05-12 RX ADMIN — FENTANYL CITRATE 25 MCG: 50 INJECTION INTRAMUSCULAR; INTRAVENOUS at 14:42

## 2025-05-12 RX ADMIN — PROPOFOL 160 MG: 10 INJECTION, EMULSION INTRAVENOUS at 14:21

## 2025-05-12 RX ADMIN — SODIUM CHLORIDE, SODIUM LACTATE, POTASSIUM CHLORIDE, AND CALCIUM CHLORIDE: .6; .31; .03; .02 INJECTION, SOLUTION INTRAVENOUS at 11:44

## 2025-05-12 RX ADMIN — Medication 200 MG: at 14:52

## 2025-05-12 RX ADMIN — FENTANYL CITRATE 25 MCG: 50 INJECTION INTRAMUSCULAR; INTRAVENOUS at 14:31

## 2025-05-12 RX ADMIN — FENTANYL CITRATE 50 MCG: 50 INJECTION INTRAMUSCULAR; INTRAVENOUS at 15:10

## 2025-05-12 ASSESSMENT — ACTIVITIES OF DAILY LIVING (ADL)
ADLS_ACUITY_SCORE: 30
ADLS_ACUITY_SCORE: 31
ADLS_ACUITY_SCORE: 30
ADLS_ACUITY_SCORE: 30

## 2025-05-12 NOTE — ANESTHESIA PROCEDURE NOTES
Airway       Patient location during procedure: OR       Procedure Start/Stop Times: 5/12/2025 2:23 PM  Staff -        CRNA: Alexis Palafox APRN CRNA       Other Anesthesia Staff: Kaylee Collazo       Performed By: CRNA and SRNA  Consent for Airway        Urgency: elective  Indications and Patient Condition       Induction type:intravenous       Mask difficulty assessment: 2 - vent by mask + OA or adjuvant +/- NMBA    Final Airway Details       Final airway type: endotracheal airway       Successful airway: ETT - single  Endotracheal Airway Details        ETT size (mm): 7.5       Successful intubation technique: direct laryngoscopy       DL Blade Type: Mann 2       Grade View of Cords: 1       Adjucts: stylet       Position: Right       Measured from: lips       Secured at (cm): 24       Bite block used: None    Post intubation assessment        Placement verified by: capnometry and equal breath sounds        Number of attempts at approach: 1       Secured with: tape       Ease of procedure: easy       Dentition: Intact    Medication(s) Administered   Medication Administration Time: 5/12/2025 2:23 PM

## 2025-05-12 NOTE — INTERVAL H&P NOTE
"I have reviewed the surgical (or preoperative) H&P that is linked to this encounter, and examined the patient. There are no significant changes    Clinical Conditions Present on Arrival:  Clinically Significant Risk Factors Present on Admission                           # Overweight: Estimated body mass index is 26.85 kg/m  as calculated from the following:    Height as of this encounter: 1.727 m (5' 8\").    Weight as of this encounter: 80.1 kg (176 lb 9.6 oz).           "

## 2025-05-12 NOTE — ANESTHESIA CARE TRANSFER NOTE
Patient: Christopher Valentine    Procedure: Procedure(s):  HERNIORRHAPHY, VENTRAL, OPEN       Diagnosis: Ventral hernia [K43.9]  Diagnosis Additional Information: No value filed.    Anesthesia Type:   General     Note:      Level of Consciousness: drowsy  Oxygen Supplementation: nasal cannula  Level of Supplemental Oxygen (L/min / FiO2): 3  Independent Airway: airway patency satisfactory and stable  Dentition: dentition unchanged  Vital Signs Stable: post-procedure vital signs reviewed and stable  Report to RN Given: handoff report given  Patient transferred to: PACU    Handoff Report: Identifed the Patient, Identified the Reponsible Provider, Reviewed the pertinent medical history, Discussed the surgical course, Reviewed Intra-OP anesthesia mangement and issues during anesthesia, Set expectations for post-procedure period and Allowed opportunity for questions and acknowledgement of understanding  Vitals:  Vitals Value Taken Time   /92 05/12/25 15:00   Temp 97.7  F (36.5  C) 05/12/25 15:00   Pulse 75 05/12/25 15:02   Resp 23 05/12/25 15:02   SpO2 97 % 05/12/25 15:02   Vitals shown include unfiled device data.    Electronically Signed By: JACINTA Rubio CRNA  May 12, 2025  3:04 PM

## 2025-05-12 NOTE — ANESTHESIA POSTPROCEDURE EVALUATION
Patient: Christopher Valentine    Procedure: Procedure(s):  HERNIORRHAPHY, VENTRAL, OPEN       Anesthesia Type:  General    Note:  Disposition: Outpatient   Postop Pain Control: Uneventful            Sign Out: Well controlled pain   PONV: No   Neuro/Psych: Uneventful            Sign Out: Acceptable/Baseline neuro status   Airway/Respiratory: Uneventful            Sign Out: Acceptable/Baseline resp. status   CV/Hemodynamics: Uneventful            Sign Out: Acceptable CV status; No obvious hypovolemia; No obvious fluid overload   Other NRE: NONE   DID A NON-ROUTINE EVENT OCCUR?        Last vitals:  Vitals Value Taken Time   /100 05/12/25 15:40   Temp 98.2  F (36.8  C) 05/12/25 15:15   Pulse 72 05/12/25 15:40   Resp 19 05/12/25 15:40   SpO2 94 % 05/12/25 15:40       Electronically Signed By: JACINTA Rubio CRNA  May 12, 2025  4:55 PM

## 2025-05-12 NOTE — OR NURSING
Patient and responsible adult given discharge instructions with no questions regarding instructions. Felicitas score 20. Pain level 0/10.  Discharged from unit via wheelchair. Patient discharged to home.

## 2025-05-12 NOTE — OP NOTE
REPORT OF OPERATION  DATE OF PROCEDURE: 5/12/2025    PATIENT: Christopher Valentine    SURGERY PERFORMED: Open Ventral Hernia Repair, without incarceration, is not recurrent.    PREOPERATIVE DIAGNOSIS: Ventral Hernia, without incarceration, is not recurrent    POSTOPERATIVE DIAGNOSIS: Ventral Hernia, without incarceration, without strangulation , is not recurrent    SURGEON: Porfirio Matthew MD    ASSISTANTS: Aracelis Che CNP, Her assistance was required because of the technical aspects of the case    ANESTHESIA: General Endotracheal Anesthesia    COMPLICATIONS: None apparent    ESTIMATED BLOOD LOSS: 20 cc    TRANSFUSIONS: None    TISSUE TO PATHOLOGY:  None    FINDINGS:   Ventral Hernia, without incarceration, without strangulation, is not recurrent  Hernia size: 9 cm    INDICATIONS:  This is a 75 year old male with a ventral hemia.  The hernia is not tender, does not show evidence of incarceration and does not show evidence of strangulation.  The hernia is not recurrent.  The patient will be taken to the operating suite for an open ventral herniorrhaphy.    DESCRIPTIONS OF PROCEDURE IN DETAIL: After consent was obtained the patient was taken to the operative suite and alfonzo in the supine position.  The patient was identified and the correct patient was confirmed. General Endotracheal Anesthesia was induced by anesthesia.  The patient was sterilely prepped and draped in the usual fashion.  A time out was performed verifying the correct patient and the correct procedure.  The entire operative team was in agreement.  All necessary equipment and supplies were in the room.    After identification of the hernia, the skin overlying the hernia was sharply entered and dissection was carried down until isolation of the hernia.  The hernia sac was dissected from surrounding tissues and then opened.  Incarcerated omentum and/or bowel was not found within the hernia sac.  Adhesions of the hernia contents to the sac were  found and these were carefully taken down freeing up the hernia sac contents.  All of the hernia's contents did appear to be viable.  The contents were reduced back into the abdomen.  This did not require opening the abdominal wall to allow the reduction.  The defect was then closed by interrupted 0 Ethibond suture.   The incision was then closed by stainless steel staples.  Sponge, needle and instrument counts were correct times two.    Sterile dressings were applied.      The patient was awakened in the operative suite and extubated without difficulty and taken to the recovery room in stable condition tolerating the procedure well.

## 2025-05-12 NOTE — DISCHARGE INSTRUCTIONS
Thank you for allowing [facility] to care for you today.  If you have any questions and/or concerns please reach out to us Monday-Friday 0800-4:00 PM at [phone].  After hours please go to the Urgent Care and/or Emergency Room.  If you feel like it is an emergency call 911.

## 2025-05-12 NOTE — ANESTHESIA PROCEDURE NOTES
Rectus Sheath Procedure Note    Pre-Procedure   Staff -        CRNA: Alexsi Palafox APRN CRNA       Other Anesthesia Staff: aKylee Collazo       Performed By: CRNA and ROGELIO       Location: OR       Procedure Start/Stop Times: 5/12/2025 2:26 PM and 5/12/2025 2:30 PM       Pre-Anesthestic Checklist: patient identified, IV checked, site marked, risks and benefits discussed, informed consent, monitors and equipment checked, pre-op evaluation, at physician/surgeon's request and post-op pain management  Timeout:       Correct Patient: Yes        Correct Procedure: Yes        Correct Site: Yes        Correct Position: Yes        Correct Laterality: Yes        Site Marked: Yes  Procedure Documentation  Procedure: Rectus Sheath         Diagnosis: POST OP PAIN MANAGEMENT       Laterality: bilateral       Patient Position: supine       Skin prep: Chloraprep       Needle Type: insulated and short bevel       Needle Gauge: 20.        Needle Length (Inches): 4        Ultrasound guided       1. Ultrasound was used to identify targeted nerve, plexus, vascular marker, or fascial plane and place a needle adjacent to it in real-time.       2. Ultrasound was used to visualize the spread of anesthetic in close proximity to the above referenced structure.       3. A permanent image is entered into the patient's record.       4. The visualized anatomic structures appeared normal.       5. There were no apparent abnormal pathologic findings.    Assessment/Narrative         The placement was negative for: blood aspirated, painful injection and site bleeding       Paresthesias: No.       Bolus given via needle. no blood aspirated via catheter.        Secured via.        Insertion/Infusion Method: Single Shot       Complications: none       Injection made incrementally with aspirations every 5 mL.    Medication(s) Administered   Bupivacaine 0.25% PF (Infiltration) - Infiltration, Abdominal Tissue   30 mL - 5/12/2025 2:26:00  "PM  Bupivacaine liposome (Exparel) 1.3% LA inj susp (Infiltration) - Infiltration, Abdominal Tissue   10 mL - 5/12/2025 2:26:00 PM  Dexamethasone 10 mg/mL PF (Perineural) - Infiltration, Abdominal Tissue   10 mg - 5/12/2025 2:26:00 PM  Medication Administration Time: 5/12/2025 2:26 PM      FOR [facility] ([facility]) ONLY:   Pain Team Contact information: please page the Pain Team Via [employer]. Search \"Pain\". During daytime hours, please page the attending first. At night please page the resident first.      "

## 2025-06-05 ENCOUNTER — OFFICE VISIT (OUTPATIENT)
Dept: SURGERY | Facility: OTHER | Age: 75
End: 2025-06-05
Attending: NURSE PRACTITIONER
Payer: COMMERCIAL

## 2025-06-05 VITALS
RESPIRATION RATE: 18 BRPM | TEMPERATURE: 96.6 F | DIASTOLIC BLOOD PRESSURE: 76 MMHG | SYSTOLIC BLOOD PRESSURE: 138 MMHG | HEART RATE: 86 BPM | OXYGEN SATURATION: 97 %

## 2025-06-05 DIAGNOSIS — Z98.890 STATUS POST HERNIA REPAIR: Primary | ICD-10-CM

## 2025-06-05 DIAGNOSIS — Z87.19 STATUS POST HERNIA REPAIR: Primary | ICD-10-CM

## 2025-06-05 PROCEDURE — G0463 HOSPITAL OUTPT CLINIC VISIT: HCPCS

## 2025-06-05 ASSESSMENT — PAIN SCALES - GENERAL: PAINLEVEL_OUTOF10: NO PAIN (0)

## 2025-06-05 NOTE — PROGRESS NOTES
CLINIC NOTE - POST-OP SURGERY  6/5/2025    Patient:Christopher Valentine    Procedure: Ventral Hernia Repair (open)    This is a 75 year old male who is 3 weeks s/p Ventral Hernia Repair (open).  The patient has no complaints today.     Current Medications:  No current outpatient medications on file.       Allergies:  No Known Allergies    PHYSICAL EXAM:   Vital signs: /76 (BP Location: Right arm, Cuff Size: Adult Regular)   Pulse 86   Temp (!) 96.6  F (35.9  C) (Tympanic)   Resp 18   SpO2 97%    BMI: There is no height or weight on file to calculate BMI.   General: Normal, healthy, cooperative, in no acute distress, alert   Lungs: respirations are non-labored   Abdominal: non-distended; some swelling is noted to the right and midline of the incision   Wounds:  Well healed surgical scars consistent with his operation.     ASSESSMENT:    75 year old male who is 3 weeks s/p Ventral Hernia Repair (open).  Doing well.     PLAN:   Staples were removed without problems    Follow-up in 1 month as needed      Restrictions : Will continue lifting restrictions of no more than 10 pounds until 6 weeks after surgery

## 2025-06-24 ENCOUNTER — PATIENT OUTREACH (OUTPATIENT)
Dept: GASTROENTEROLOGY | Facility: CLINIC | Age: 75
End: 2025-06-24

## (undated) DEVICE — TUBING SUCTION 20FT N620A

## (undated) DEVICE — SU MONOCRYL 4-0 PS-2 18" UND Y496G

## (undated) DEVICE — EYE PREP BETADINE 5% SOLUTION 30ML 0065-0411-30

## (undated) DEVICE — SU SILK 2-0 TIE 12X30" A305H

## (undated) DEVICE — DRESSING MEPILEX BORDER AG 3" X 3" (7.5CM X 7.5CM) 395290

## (undated) DEVICE — ESU GROUND PAD ADULT W/CORD E7507

## (undated) DEVICE — SOL WATER IRRIG 1000ML BOTTLE 2F7114

## (undated) DEVICE — SU VICRYL 0 UR-6 27" J603H

## (undated) DEVICE — SOL NACL 0.9% IRRIG 1000ML BOTTLE 2F7124

## (undated) DEVICE — APPLICATOR-CHLORAPREP 26ML TINTED CHG 2%+ 70% IPA-SURGICAL

## (undated) DEVICE — SCD SLEEVE-KNEE REG.

## (undated) DEVICE — BIN-COVIDIEN MESH BIN BN50

## (undated) DEVICE — PACK BASIN SET UP SUTCNBSBBA

## (undated) DEVICE — SU SILK 3-0 SH CR 8X18" C013D

## (undated) DEVICE — PACK-KNEE-CUSTOM

## (undated) DEVICE — CONNECTOR-ERBEFLO 2 PORT

## (undated) DEVICE — SUCTION MANIFOLD NEPTUNE 2 SYS 1 PORT 702-025-000

## (undated) DEVICE — STAPLER-SKIN 35 WIDE STAPLES

## (undated) DEVICE — PACK LAPAROSCOPY CUSTOM SBACNLSMBF

## (undated) DEVICE — Device

## (undated) DEVICE — FOOT PADS-TOTAL KNEE POSITIONER

## (undated) DEVICE — OSTOMY BARRIER CONVEX 2 1/4" FLEXTEND SZ 1 1/2  14803

## (undated) DEVICE — LABEL-STERILE PREPRINTED FOR OR

## (undated) DEVICE — RELOAD-BLUE 75MM PROXIMATE

## (undated) DEVICE — SU VICRYL 3-0 SH 27" UND J416H

## (undated) DEVICE — ENDO TROCAR SLEEVE KII ADV FIXATION 05X100MM CFS02

## (undated) DEVICE — BLADE-SAGITTAL DUAL CUT 25MM X 100MM X 1.27MM

## (undated) DEVICE — EVAC SYSTEM CLEAR FLOW SC082500

## (undated) DEVICE — PACK LAPAROTOMY CUSTOM SBA32LPMBG

## (undated) DEVICE — POUCH-INSTRUMENT 3 COMP 9-5/8 X 18 IN

## (undated) DEVICE — DRESSING MEPILEX AG SILVER 4X8 395890

## (undated) DEVICE — DRAPE IOBAN INCISE 13X13" 6640EZ

## (undated) DEVICE — PREP CHLORAPREP 26ML TINTED HI-LITE ORANGE 930815

## (undated) DEVICE — GLOVE 8.5 PROTEXIS PI CLSC PF BD CUF STRL LF 12IN 2D72PL85X

## (undated) DEVICE — SUTURE-VICRYL 2-0 CP-1 VCP266H

## (undated) DEVICE — SLEEVE SCD EXPRESS KNEE LENGTH MED 9529

## (undated) DEVICE — CAUTERY-INSULATED 2.75" EDGE

## (undated) DEVICE — LIGHT HANDLE COVER FOR SKYTRON LIGHTS

## (undated) DEVICE — LABEL STERILE PREPRINTED FOR OR FRRH01-2M

## (undated) DEVICE — CANISTER SUCTION MEDI-VAC GUARDIAN 2000ML 90D 65651-220

## (undated) DEVICE — COVER LT HANDLE 2/PK 5160-2FG

## (undated) DEVICE — TUBING-SUCTION 20FT

## (undated) DEVICE — LINEAR CUTTER-BLUE 75MM PROXIMATE

## (undated) DEVICE — ENDO GELPORT 100/120MM C8XX2

## (undated) DEVICE — BLANKET BAIR HUGGER UPPER BODY 42268

## (undated) DEVICE — CAUTERY PAD-POLYHESIVE II ADULT

## (undated) DEVICE — GOWN SURG XL LVL 3 REINFORCED 9541

## (undated) DEVICE — NDL-SCLEROTHERAPY INTERJECT

## (undated) DEVICE — TOPICAL SKIN ADHESIVE EXOFIN

## (undated) DEVICE — FORCEPS BIOPSY RADIAL JAW 4 LARGE W/NEEDLE 240CM M00513332

## (undated) DEVICE — DRSG GAUZE 4X4" 3033

## (undated) DEVICE — SNARE-ROTATABLE 20MM MINI OVAL

## (undated) DEVICE — CATH TRAY 16FR METER W/STATLOCK LATEX] A902916

## (undated) DEVICE — SU PDS II 1 TP-1 48" Z880G

## (undated) DEVICE — STAPLER-PROXIMATE 3.5MM X 60MM

## (undated) DEVICE — TUBE NASOGASTRIC 18FR 48" 2 LUMEN 8888266148

## (undated) DEVICE — SYSTEM CLEARIFY VISUALIZATION 21-345

## (undated) DEVICE — STPL SKIN 35W 6.9MM  PXW35

## (undated) DEVICE — TUBING INSUFFLATION INSUFFLATOR FILTER HIGH FLOW 031322-01

## (undated) DEVICE — SU ETHIBOND 0 MO-7 CR 8X18" CX41D

## (undated) DEVICE — CONNECTOR ERBEFLO 2 PORT 20325-215

## (undated) DEVICE — BLADE 15 RB BK SS STRL LF DISPLF DISP 371215

## (undated) DEVICE — SU DERMABOND ADVANCED .7ML DNX12

## (undated) DEVICE — ICEMAN W/UNIVERSAL WRAP-ON PAD

## (undated) DEVICE — ENDO TROCAR FIRST ENTRY KII FIOS ADV FIX 05X100MM CFF03

## (undated) DEVICE — GLV-8.5 ORTHO PROTEXIS PI LF/PF

## (undated) DEVICE — IRRIGATION-H2O 1000ML BAG

## (undated) DEVICE — IRRIGATION-H2O 1000ML

## (undated) DEVICE — GLV-9.0 ORTHO PROTEXIS PI LF/PF

## (undated) DEVICE — ENDO SNARE EXACTO COLD 9MM LOOP 2.4MMX230CM 00711115

## (undated) DEVICE — BLADE-SAGITTAL 18MM X 90MM X 1.27MM

## (undated) DEVICE — IRRIGATION-NACL 1000ML

## (undated) DEVICE — ESU LIGASURE MARYLAND LAPAROSCOPIC SLR/DVDR 5MMX37CM LF1937

## (undated) DEVICE — APPLICATOR BNZN TNCT RYN SWBSTK NWVN SNGL APLS1106

## (undated) DEVICE — ENDO TRAP POLYP E-TRAP 00711099

## (undated) DEVICE — PULSE LAVAGE IRRIGATION SYSTEM

## (undated) DEVICE — TRAP-POLYP E-TRAP

## (undated) DEVICE — FORCEP-COLON BIOPSY LARGE W/NEEDLE 240CM

## (undated) DEVICE — PACK-BASIN SET-UP

## (undated) DEVICE — CUFF-DISP STERILE 30IN SINGLE BLADDER

## (undated) DEVICE — DRSG-AQUACEL AG 3.5" X 10" SURGICAL

## (undated) DEVICE — DRSG STERI STRIP 1/2X4" R1547

## (undated) RX ORDER — FENTANYL CITRATE 50 UG/ML
INJECTION, SOLUTION INTRAMUSCULAR; INTRAVENOUS
Status: DISPENSED
Start: 2025-05-12

## (undated) RX ORDER — LIDOCAINE HYDROCHLORIDE 20 MG/ML
INJECTION, SOLUTION EPIDURAL; INFILTRATION; INTRACAUDAL; PERINEURAL
Status: DISPENSED
Start: 2021-04-15

## (undated) RX ORDER — PROPOFOL 10 MG/ML
INJECTION, EMULSION INTRAVENOUS
Status: DISPENSED
Start: 2023-09-28

## (undated) RX ORDER — ONDANSETRON 2 MG/ML
INJECTION INTRAMUSCULAR; INTRAVENOUS
Status: DISPENSED
Start: 2023-11-16

## (undated) RX ORDER — FENTANYL CITRATE 50 UG/ML
INJECTION, SOLUTION INTRAMUSCULAR; INTRAVENOUS
Status: DISPENSED
Start: 2023-11-16

## (undated) RX ORDER — PROPOFOL 10 MG/ML
INJECTION, EMULSION INTRAVENOUS
Status: DISPENSED
Start: 2025-05-12

## (undated) RX ORDER — ONDANSETRON 2 MG/ML
INJECTION INTRAMUSCULAR; INTRAVENOUS
Status: DISPENSED
Start: 2025-05-12

## (undated) RX ORDER — PROPOFOL 10 MG/ML
INJECTION, EMULSION INTRAVENOUS
Status: DISPENSED
Start: 2021-04-15

## (undated) RX ORDER — PROPOFOL 10 MG/ML
INJECTION, EMULSION INTRAVENOUS
Status: DISPENSED
Start: 2023-11-16

## (undated) RX ORDER — DEXAMETHASONE SODIUM PHOSPHATE 10 MG/ML
INJECTION, SOLUTION INTRAMUSCULAR; INTRAVENOUS
Status: DISPENSED
Start: 2025-05-12

## (undated) RX ORDER — DEXAMETHASONE SODIUM PHOSPHATE 10 MG/ML
INJECTION, SOLUTION INTRAMUSCULAR; INTRAVENOUS
Status: DISPENSED
Start: 2023-11-16

## (undated) RX ORDER — BUPIVACAINE HYDROCHLORIDE 2.5 MG/ML
INJECTION, SOLUTION EPIDURAL; INFILTRATION; INTRACAUDAL; PERINEURAL
Status: DISPENSED
Start: 2025-05-12

## (undated) RX ORDER — PROPOFOL 10 MG/ML
INJECTION, EMULSION INTRAVENOUS
Status: DISPENSED
Start: 2025-05-08

## (undated) RX ORDER — FENTANYL CITRATE 50 UG/ML
INJECTION, SOLUTION INTRAMUSCULAR; INTRAVENOUS
Status: DISPENSED
Start: 2021-12-15